# Patient Record
Sex: MALE | Race: WHITE | Employment: OTHER | URBAN - METROPOLITAN AREA
[De-identification: names, ages, dates, MRNs, and addresses within clinical notes are randomized per-mention and may not be internally consistent; named-entity substitution may affect disease eponyms.]

---

## 2017-07-12 ENCOUNTER — OFFICE VISIT (OUTPATIENT)
Dept: INTERNAL MEDICINE | Age: 47
End: 2017-07-12
Attending: INTERNAL MEDICINE

## 2017-07-12 VITALS
SYSTOLIC BLOOD PRESSURE: 121 MMHG | HEART RATE: 95 BPM | DIASTOLIC BLOOD PRESSURE: 73 MMHG | OXYGEN SATURATION: 96 % | TEMPERATURE: 98.3 F | RESPIRATION RATE: 16 BRPM | WEIGHT: 292.2 LBS | BODY MASS INDEX: 39.58 KG/M2 | HEIGHT: 72 IN

## 2017-07-12 DIAGNOSIS — K21.9 GASTROESOPHAGEAL REFLUX DISEASE WITHOUT ESOPHAGITIS: ICD-10-CM

## 2017-07-12 DIAGNOSIS — R55 VASOVAGAL SYNCOPE: Primary | ICD-10-CM

## 2017-07-12 DIAGNOSIS — I10 ESSENTIAL HYPERTENSION: ICD-10-CM

## 2017-07-12 DIAGNOSIS — K21.9 GASTROESOPHAGEAL REFLUX DISEASE, ESOPHAGITIS PRESENCE NOT SPECIFIED: ICD-10-CM

## 2017-07-12 DIAGNOSIS — F41.0 PANIC ATTACKS: ICD-10-CM

## 2017-07-12 PROBLEM — F31.9 BIPOLAR AFFECTIVE DISORDER (HCC): Status: ACTIVE | Noted: 2017-07-12

## 2017-07-12 RX ORDER — PANTOPRAZOLE SODIUM 40 MG/1
40 TABLET, DELAYED RELEASE ORAL DAILY
Qty: 30 TABLET | Refills: 2 | Status: SHIPPED | OUTPATIENT
Start: 2017-07-12 | End: 2018-04-20 | Stop reason: ALTCHOICE

## 2017-07-12 RX ORDER — LISINOPRIL AND HYDROCHLOROTHIAZIDE 20; 12.5 MG/1; MG/1
1 TABLET ORAL DAILY
Qty: 30 TABLET | Refills: 2 | Status: SHIPPED | OUTPATIENT
Start: 2017-07-12 | End: 2018-04-20

## 2017-07-12 ASSESSMENT — ENCOUNTER SYMPTOMS
BLOOD IN STOOL: 0
CONSTIPATION: 0
SPUTUM PRODUCTION: 0
EYE PAIN: 0
EYE REDNESS: 0
SHORTNESS OF BREATH: 0
SORE THROAT: 0
DOUBLE VISION: 0
EYE DISCHARGE: 0
VOMITING: 0
COUGH: 1
BLURRED VISION: 1
HEMOPTYSIS: 0
PHOTOPHOBIA: 0
DIARRHEA: 0
HEARTBURN: 1

## 2018-08-10 ENCOUNTER — HOSPITAL ENCOUNTER (EMERGENCY)
Age: 48
Discharge: HOME | End: 2018-08-10
Attending: EMERGENCY MEDICINE
Payer: COMMERCIAL

## 2018-08-10 ENCOUNTER — APPOINTMENT (OUTPATIENT)
Dept: CT IMAGING | Age: 48
End: 2018-08-10
Payer: COMMERCIAL

## 2018-08-10 VITALS
RESPIRATION RATE: 18 BRPM | WEIGHT: 290 LBS | DIASTOLIC BLOOD PRESSURE: 90 MMHG | BODY MASS INDEX: 39.28 KG/M2 | OXYGEN SATURATION: 97 % | SYSTOLIC BLOOD PRESSURE: 147 MMHG | HEART RATE: 78 BPM | TEMPERATURE: 98.3 F | HEIGHT: 72 IN

## 2018-08-10 DIAGNOSIS — N39.0 URINARY TRACT INFECTION WITH HEMATURIA, SITE UNSPECIFIED: ICD-10-CM

## 2018-08-10 DIAGNOSIS — R35.0 URINARY FREQUENCY: Primary | ICD-10-CM

## 2018-08-10 DIAGNOSIS — R31.9 HEMATURIA, UNSPECIFIED TYPE: ICD-10-CM

## 2018-08-10 DIAGNOSIS — R03.0 ELEVATED BLOOD PRESSURE READING: ICD-10-CM

## 2018-08-10 DIAGNOSIS — N28.1 RENAL CYST, LEFT: ICD-10-CM

## 2018-08-10 DIAGNOSIS — N23 RENAL COLIC: ICD-10-CM

## 2018-08-10 DIAGNOSIS — R31.9 URINARY TRACT INFECTION WITH HEMATURIA, SITE UNSPECIFIED: ICD-10-CM

## 2018-08-10 LAB
A/G RATIO: 1.7 (ref 1.1–2.2)
ALBUMIN SERPL-MCNC: 4.1 G/DL (ref 3.4–5)
ALP BLD-CCNC: 95 U/L (ref 40–129)
ALT SERPL-CCNC: 33 U/L (ref 10–40)
ANION GAP SERPL CALCULATED.3IONS-SCNC: 9 MMOL/L (ref 3–16)
AST SERPL-CCNC: 22 U/L (ref 15–37)
BILIRUB SERPL-MCNC: <0.2 MG/DL (ref 0–1)
BILIRUBIN URINE: NEGATIVE
BLOOD, URINE: ABNORMAL
BUN BLDV-MCNC: 12 MG/DL (ref 7–20)
CALCIUM SERPL-MCNC: 8.9 MG/DL (ref 8.3–10.6)
CHLORIDE BLD-SCNC: 102 MMOL/L (ref 99–110)
CLARITY: CLEAR
CO2: 25 MMOL/L (ref 21–32)
COLOR: YELLOW
CREAT SERPL-MCNC: 0.9 MG/DL (ref 0.9–1.3)
GFR AFRICAN AMERICAN: >60
GFR NON-AFRICAN AMERICAN: >60
GLOBULIN: 2.4 G/DL
GLUCOSE BLD-MCNC: 113 MG/DL (ref 70–99)
GLUCOSE URINE: NEGATIVE MG/DL
HCT VFR BLD CALC: 42.4 % (ref 40.5–52.5)
HEMOGLOBIN: 14.2 G/DL (ref 13.5–17.5)
KETONES, URINE: NEGATIVE MG/DL
LEUKOCYTE ESTERASE, URINE: NEGATIVE
MCH RBC QN AUTO: 33.1 PG (ref 26–34)
MCHC RBC AUTO-ENTMCNC: 33.6 G/DL (ref 31–36)
MCV RBC AUTO: 98.6 FL (ref 80–100)
MICROSCOPIC EXAMINATION: YES
NITRITE, URINE: NEGATIVE
PDW BLD-RTO: 13 % (ref 12.4–15.4)
PH UA: 6
PLATELET # BLD: 268 K/UL (ref 135–450)
PMV BLD AUTO: 6.6 FL (ref 5–10.5)
POTASSIUM SERPL-SCNC: 3.6 MMOL/L (ref 3.5–5.1)
PROTEIN UA: NEGATIVE MG/DL
RBC # BLD: 4.3 M/UL (ref 4.2–5.9)
RBC UA: ABNORMAL /HPF (ref 0–2)
SODIUM BLD-SCNC: 136 MMOL/L (ref 136–145)
SPECIFIC GRAVITY UA: >=1.03
TOTAL PROTEIN: 6.5 G/DL (ref 6.4–8.2)
URINE TYPE: ABNORMAL
UROBILINOGEN, URINE: 0.2 E.U./DL
WBC # BLD: 10.2 K/UL (ref 4–11)
WBC UA: ABNORMAL /HPF (ref 0–5)

## 2018-08-10 PROCEDURE — 81001 URINALYSIS AUTO W/SCOPE: CPT

## 2018-08-10 PROCEDURE — 99284 EMERGENCY DEPT VISIT MOD MDM: CPT

## 2018-08-10 PROCEDURE — 80053 COMPREHEN METABOLIC PANEL: CPT

## 2018-08-10 PROCEDURE — 74176 CT ABD & PELVIS W/O CONTRAST: CPT

## 2018-08-10 PROCEDURE — 96374 THER/PROPH/DIAG INJ IV PUSH: CPT

## 2018-08-10 PROCEDURE — 51798 US URINE CAPACITY MEASURE: CPT

## 2018-08-10 PROCEDURE — 6360000002 HC RX W HCPCS: Performed by: NURSE PRACTITIONER

## 2018-08-10 PROCEDURE — 85027 COMPLETE CBC AUTOMATED: CPT

## 2018-08-10 PROCEDURE — 6370000000 HC RX 637 (ALT 250 FOR IP): Performed by: EMERGENCY MEDICINE

## 2018-08-10 PROCEDURE — 96375 TX/PRO/DX INJ NEW DRUG ADDON: CPT

## 2018-08-10 RX ORDER — SULFAMETHOXAZOLE AND TRIMETHOPRIM 800; 160 MG/1; MG/1
1 TABLET ORAL ONCE
Status: COMPLETED | OUTPATIENT
Start: 2018-08-10 | End: 2018-08-10

## 2018-08-10 RX ORDER — KETOROLAC TROMETHAMINE 30 MG/ML
30 INJECTION, SOLUTION INTRAMUSCULAR; INTRAVENOUS ONCE
Status: COMPLETED | OUTPATIENT
Start: 2018-08-10 | End: 2018-08-10

## 2018-08-10 RX ORDER — ONDANSETRON 2 MG/ML
4 INJECTION INTRAMUSCULAR; INTRAVENOUS ONCE
Status: COMPLETED | OUTPATIENT
Start: 2018-08-10 | End: 2018-08-10

## 2018-08-10 RX ORDER — SULFAMETHOXAZOLE AND TRIMETHOPRIM 800; 160 MG/1; MG/1
1 TABLET ORAL 2 TIMES DAILY
Qty: 6 TABLET | Refills: 0 | Status: SHIPPED | OUTPATIENT
Start: 2018-08-10 | End: 2018-08-13

## 2018-08-10 RX ORDER — PHENAZOPYRIDINE HYDROCHLORIDE 100 MG/1
100 TABLET, FILM COATED ORAL 3 TIMES DAILY PRN
Qty: 9 TABLET | Refills: 0 | Status: SHIPPED | OUTPATIENT
Start: 2018-08-10 | End: 2018-08-13

## 2018-08-10 RX ORDER — PHENAZOPYRIDINE HYDROCHLORIDE 100 MG/1
200 TABLET, FILM COATED ORAL ONCE
Status: COMPLETED | OUTPATIENT
Start: 2018-08-10 | End: 2018-08-10

## 2018-08-10 RX ADMIN — KETOROLAC TROMETHAMINE 30 MG: 30 INJECTION, SOLUTION INTRAMUSCULAR at 02:02

## 2018-08-10 RX ADMIN — PHENAZOPYRIDINE HYDROCHLORIDE 200 MG: 100 TABLET ORAL at 03:52

## 2018-08-10 RX ADMIN — SULFAMETHOXAZOLE AND TRIMETHOPRIM 1 TABLET: 800; 160 TABLET ORAL at 03:52

## 2018-08-10 RX ADMIN — ONDANSETRON 4 MG: 2 INJECTION INTRAMUSCULAR; INTRAVENOUS at 02:02

## 2018-08-10 ASSESSMENT — ENCOUNTER SYMPTOMS
CONSTIPATION: 0
COUGH: 0
EYES NEGATIVE: 1
WHEEZING: 0
ABDOMINAL DISTENTION: 0
SHORTNESS OF BREATH: 0
NAUSEA: 1
ABDOMINAL PAIN: 0
VOMITING: 0
ALLERGIC/IMMUNOLOGIC NEGATIVE: 1
BACK PAIN: 0
DIARRHEA: 0

## 2018-08-10 ASSESSMENT — PAIN DESCRIPTION - LOCATION
LOCATION: ABDOMEN
LOCATION: ABDOMEN

## 2018-08-10 ASSESSMENT — PAIN DESCRIPTION - PAIN TYPE
TYPE: ACUTE PAIN
TYPE: ACUTE PAIN

## 2018-08-10 ASSESSMENT — PAIN SCALES - GENERAL
PAINLEVEL_OUTOF10: 6
PAINLEVEL_OUTOF10: 6
PAINLEVEL_OUTOF10: 5

## 2018-08-10 NOTE — ED PROVIDER NOTES
headaches. Hematological: Negative. Psychiatric/Behavioral: Negative. Positives and Pertinent negatives as per HPI. Except as noted above in the ROS, all other systems were reviewed and negative. PAST MEDICAL HISTORY     Past Medical History:   Diagnosis Date    Anxiety     Chronic back pain     Chronic neck pain     Degenerative disc disease     Degenerative disc disease     Degenerative disc disease     Diverticulitis     Hypertension     Manic depression ([facility] [address]     Obesity     Scoliosis          SURGICAL HISTORY       Past Surgical History:   Procedure Laterality Date    KIDNEY STONE SURGERY           CURRENT MEDICATIONS       Previous Medications    ASPIRIN 81 MG CHEWABLE TABLET    Take 1 tablet by mouth daily. LISINOPRIL-HYDROCHLOROTHIAZIDE (PRINZIDE;ZESTORETIC) 20-12.5 MG PER TABLET    Take 1 tablet by mouth daily         ALLERGIES     Ciprofloxacin    FAMILY HISTORY     History reviewed. No pertinent family history. SOCIAL HISTORY       Social History     Social History    Marital status:      Spouse name: N/A    Number of children: N/A    Years of education: N/A     Social History Main Topics    Smoking status: Current Every Day Smoker     Packs/day: 0.50     Years: 25.00     Types: Cigarettes    Smokeless tobacco: Never Used    Alcohol use Yes      Comment: OCC     Drug use: No    Sexual activity: Yes     Partners: Female     Other Topics Concern    None     Social History Narrative    None       SCREENINGS             PHYSICAL EXAM    (up to 7 for level 4, 8 or more for level 5)     ED Triage Vitals [08/10/18 0131]   BP Temp Temp Source Pulse Resp SpO2 Height Weight   (!) 170/96 98.3 °F (36.8 °C) Oral 97 18 97 % 6' (1.829 m) 290 lb (131.5 kg)       Physical Exam   Constitutional: He is oriented to person, place, and time. He appears well-developed and well-nourished. No distress. HENT:   Head: Normocephalic and atraumatic.    Nose: Nose normal. Dr. Kalyani Dobbs, please see attending note for further information    The patient tolerated their visit well. They were seen and evaluated by the attending physician who agreed with the assessment and plan. The patient and / or the family were informed of the results of any tests, a time was given to answer questions, a plan was proposed and they agreed with plan. FINAL IMPRESSION      1. Urinary frequency          DISPOSITION/PLAN   DISPOSITION        PATIENT REFERRED TO:  No follow-up provider specified.     DISCHARGE MEDICATIONS:  New Prescriptions    No medications on file       DISCONTINUED MEDICATIONS:  Discontinued Medications    No medications on file              (Please note that portions of this note were completed with a voice recognition program.  Efforts were made to edit the dictations but occasionally words are mis-transcribed.)    ARAMIS Barry CNP (electronically signed)           ARAMIS Barry CNP  08/10/18 0303

## 2018-12-09 ENCOUNTER — HOSPITAL ENCOUNTER (EMERGENCY)
Age: 48
Discharge: HOME | End: 2018-12-09
Payer: COMMERCIAL

## 2018-12-09 VITALS
OXYGEN SATURATION: 97 % | HEART RATE: 72 BPM | DIASTOLIC BLOOD PRESSURE: 89 MMHG | SYSTOLIC BLOOD PRESSURE: 135 MMHG | RESPIRATION RATE: 13 BRPM

## 2018-12-09 DIAGNOSIS — T78.40XA ALLERGIC REACTION, INITIAL ENCOUNTER: Primary | ICD-10-CM

## 2018-12-09 PROCEDURE — 99282 EMERGENCY DEPT VISIT SF MDM: CPT

## 2018-12-09 ASSESSMENT — ENCOUNTER SYMPTOMS
BACK PAIN: 0
RHINORRHEA: 0
CHEST TIGHTNESS: 0
COUGH: 0
DIARRHEA: 0
NAUSEA: 0
VOMITING: 0
SHORTNESS OF BREATH: 0
SORE THROAT: 1
TROUBLE SWALLOWING: 0
VOICE CHANGE: 0
CONSTIPATION: 0
ABDOMINAL PAIN: 0

## 2018-12-09 NOTE — ED NOTES
See paper charting for 2nd epic downtime. Pt d/c'd with d/c papers.    Pt left ED in stable condition        Jayden Rodrigues RN  12/09/18 2263

## 2018-12-09 NOTE — ED PROVIDER NOTES
 Degenerative disc disease     Degenerative disc disease     Degenerative disc disease     Diverticulitis     Hypertension     Manic depression ([facility] [address]     Obesity     Scoliosis          SURGICAL HISTORY       Past Surgical History:   Procedure Laterality Date    KIDNEY STONE SURGERY           CURRENT MEDICATIONS       Discharge Medication List as of 12/9/2018  4:59 AM      CONTINUE these medications which have NOT CHANGED    Details   lisinopril-hydrochlorothiazide (PRINZIDE;ZESTORETIC) 20-12.5 MG per tablet Take 1 tablet by mouth daily, Disp-30 tablet, R-0Print      aspirin 81 MG chewable tablet Take 1 tablet by mouth daily. , Disp-30 tablet, R-3             ALLERGIES     Ciprofloxacin    FAMILY HISTORY     No family history on file. SOCIAL HISTORY       Social History     Social History    Marital status:      Spouse name: N/A    Number of children: N/A    Years of education: N/A     Social History Main Topics    Smoking status: Current Every Day Smoker     Packs/day: 0.50     Years: 25.00     Types: Cigarettes    Smokeless tobacco: Never Used    Alcohol use Yes      Comment: OCC     Drug use: No    Sexual activity: Yes     Partners: Female     Other Topics Concern    Not on file     Social History Narrative    No narrative on file       SCREENINGS             PHYSICAL EXAM    (up to 7 for level 4, 8 ormore for level 5)     ED Triage Vitals [12/09/18 0457]   BP Temp Temp src Pulse Resp SpO2 Height Weight   135/89 -- -- 72 13 97 % -- --       Physical Exam   Constitutional: He is oriented to person, place, and time. Vital signs are normal. He appears well-developed and well-nourished. He is cooperative. Non-toxic appearance. He does not have a sickly appearance. He does not appear ill. No distress. Speaking in full sentences, not in acute distress   HENT:   Head: Normocephalic and atraumatic.    Mouth/Throat: Oropharynx is clear and moist and mucous membranes are normal.   No noted above presents for possible allergic reaction after taking tramadol. States he feels like his throat was swelling.   -patient states it started to occur 3 hours prior to presentation but waited hoping it would improve.   -speaking clearly, not in acute distress, vitals stable. Denies dysphagia, trismus, sob, voice change or cough. -Given solumedrol, Pepcid, bendaryl in ED  -patient given cup of water, able to swallow with no issues  -On multiple reassessment patient states he feels swelling has improved and denies issues with swallowing water.   -Noacute pathology was noted and plan for discharge home with close follow up with PCP was discussed with patient. Strict ED return precautions given for new/worsending symptoms. Patient  in agreement with plan, verbally confirm understanding and have no further questions/concerns. REASSESSMENT      Well appearing, non toxic, alert, oriented speaking in full sentences and hemodynamically stable upon discharge      CRITICAL CARE TIME   Total Critical Care time was 0 minutes, excluding separately reportableprocedures. There was a high probability of clinicallysignificant/life threatening deterioration in the patient's condition which required my urgent intervention. CONSULTS:  None    PROCEDURES:  Unless otherwise noted below, none     Procedures    FINAL IMPRESSION      1. Allergic reaction, initial encounter          DISPOSITION/PLAN   DISPOSITION Decision To Discharge 12/09/2018 05:50:02 AM      PATIENT REFERREDTO:  No follow-up provider specified.     DISCHARGE MEDICATIONS:  Discharge Medication List as of 12/9/2018  4:59 AM             (Please note that portions of this note were completed with a voice recognition program.  Efforts were made to edit the dictations but occasionally wordsare mis-transcribed.)    Geraldine Walsh MD (electronically signed)  Attending Emergency Physician            Geraldine Walsh MD  12/09/18 0164

## 2019-03-13 ENCOUNTER — APPOINTMENT (OUTPATIENT)
Dept: GENERAL RADIOLOGY | Age: 49
End: 2019-03-13
Payer: COMMERCIAL

## 2019-03-13 ENCOUNTER — HOSPITAL ENCOUNTER (EMERGENCY)
Age: 49
Discharge: HOME | End: 2019-03-13
Payer: COMMERCIAL

## 2019-03-13 VITALS
SYSTOLIC BLOOD PRESSURE: 143 MMHG | BODY MASS INDEX: 39.28 KG/M2 | HEART RATE: 87 BPM | RESPIRATION RATE: 16 BRPM | DIASTOLIC BLOOD PRESSURE: 107 MMHG | HEIGHT: 72 IN | TEMPERATURE: 98.4 F | OXYGEN SATURATION: 97 % | WEIGHT: 290 LBS

## 2019-03-13 DIAGNOSIS — M10.9 ACUTE GOUT INVOLVING TOE OF RIGHT FOOT, UNSPECIFIED CAUSE: Primary | ICD-10-CM

## 2019-03-13 LAB
A/G RATIO: 1.9 (ref 1.1–2.2)
ALBUMIN SERPL-MCNC: 4.3 G/DL (ref 3.4–5)
ALP BLD-CCNC: 93 U/L (ref 40–129)
ALT SERPL-CCNC: 27 U/L (ref 10–40)
ANION GAP SERPL CALCULATED.3IONS-SCNC: 10 MMOL/L (ref 3–16)
AST SERPL-CCNC: 17 U/L (ref 15–37)
BASOPHILS ABSOLUTE: 0.1 K/UL (ref 0–0.2)
BASOPHILS RELATIVE PERCENT: 1 %
BILIRUB SERPL-MCNC: 0.3 MG/DL (ref 0–1)
BUN BLDV-MCNC: 13 MG/DL (ref 7–20)
CALCIUM SERPL-MCNC: 8.7 MG/DL (ref 8.3–10.6)
CHLORIDE BLD-SCNC: 100 MMOL/L (ref 99–110)
CO2: 24 MMOL/L (ref 21–32)
CREAT SERPL-MCNC: 0.9 MG/DL (ref 0.9–1.3)
EOSINOPHILS ABSOLUTE: 0.2 K/UL (ref 0–0.6)
EOSINOPHILS RELATIVE PERCENT: 2.3 %
GFR AFRICAN AMERICAN: >60
GFR NON-AFRICAN AMERICAN: >60
GLOBULIN: 2.3 G/DL
GLUCOSE BLD-MCNC: 95 MG/DL (ref 70–99)
HCT VFR BLD CALC: 42.3 % (ref 40.5–52.5)
HEMOGLOBIN: 14.5 G/DL (ref 13.5–17.5)
LYMPHOCYTES ABSOLUTE: 2.3 K/UL (ref 1–5.1)
LYMPHOCYTES RELATIVE PERCENT: 26.6 %
MCH RBC QN AUTO: 34.4 PG (ref 26–34)
MCHC RBC AUTO-ENTMCNC: 34.4 G/DL (ref 31–36)
MCV RBC AUTO: 100 FL (ref 80–100)
MONOCYTES ABSOLUTE: 0.5 K/UL (ref 0–1.3)
MONOCYTES RELATIVE PERCENT: 5.5 %
NEUTROPHILS ABSOLUTE: 5.5 K/UL (ref 1.7–7.7)
NEUTROPHILS RELATIVE PERCENT: 64.6 %
PDW BLD-RTO: 12.8 % (ref 12.4–15.4)
PLATELET # BLD: 281 K/UL (ref 135–450)
PMV BLD AUTO: 6.7 FL (ref 5–10.5)
POTASSIUM SERPL-SCNC: 3.7 MMOL/L (ref 3.5–5.1)
RBC # BLD: 4.23 M/UL (ref 4.2–5.9)
SODIUM BLD-SCNC: 134 MMOL/L (ref 136–145)
TOTAL PROTEIN: 6.6 G/DL (ref 6.4–8.2)
TROPONIN: <0.01 NG/ML
WBC # BLD: 8.5 K/UL (ref 4–11)

## 2019-03-13 PROCEDURE — 71046 X-RAY EXAM CHEST 2 VIEWS: CPT

## 2019-03-13 PROCEDURE — 93005 ELECTROCARDIOGRAM TRACING: CPT | Performed by: EMERGENCY MEDICINE

## 2019-03-13 PROCEDURE — 84484 ASSAY OF TROPONIN QUANT: CPT

## 2019-03-13 PROCEDURE — 85025 COMPLETE CBC W/AUTO DIFF WBC: CPT

## 2019-03-13 PROCEDURE — 99284 EMERGENCY DEPT VISIT MOD MDM: CPT

## 2019-03-13 PROCEDURE — 80053 COMPREHEN METABOLIC PANEL: CPT

## 2019-03-13 PROCEDURE — 6370000000 HC RX 637 (ALT 250 FOR IP): Performed by: NURSE PRACTITIONER

## 2019-03-13 PROCEDURE — 73630 X-RAY EXAM OF FOOT: CPT

## 2019-03-13 RX ORDER — LISINOPRIL AND HYDROCHLOROTHIAZIDE 25; 20 MG/1; MG/1
1 TABLET ORAL DAILY
Qty: 30 TABLET | Refills: 0 | Status: SHIPPED | OUTPATIENT
Start: 2019-03-13 | End: 2020-08-25 | Stop reason: SDUPTHER

## 2019-03-13 RX ORDER — INDOMETHACIN 25 MG/1
50 CAPSULE ORAL ONCE
Status: COMPLETED | OUTPATIENT
Start: 2019-03-13 | End: 2019-03-13

## 2019-03-13 RX ORDER — INDOMETHACIN 50 MG/1
50 CAPSULE ORAL
Qty: 15 CAPSULE | Refills: 0 | Status: SHIPPED | OUTPATIENT
Start: 2019-03-13 | End: 2019-05-14

## 2019-03-13 RX ORDER — LISINOPRIL AND HYDROCHLOROTHIAZIDE 20; 12.5 MG/1; MG/1
2 TABLET ORAL DAILY
Qty: 90 TABLET | Refills: 1 | Status: SHIPPED | OUTPATIENT
Start: 2019-03-13 | End: 2019-05-14

## 2019-03-13 RX ADMIN — INDOMETHACIN 50 MG: 25 CAPSULE ORAL at 23:29

## 2019-03-13 ASSESSMENT — PAIN DESCRIPTION - LOCATION: LOCATION: FOOT

## 2019-03-13 ASSESSMENT — PAIN SCALES - GENERAL
PAINLEVEL_OUTOF10: 7
PAINLEVEL_OUTOF10: 4

## 2019-03-13 ASSESSMENT — PAIN DESCRIPTION - PAIN TYPE: TYPE: ACUTE PAIN

## 2019-03-13 ASSESSMENT — PAIN DESCRIPTION - DESCRIPTORS: DESCRIPTORS: ACHING;SHARP

## 2019-03-13 ASSESSMENT — PAIN DESCRIPTION - ORIENTATION: ORIENTATION: LEFT

## 2019-03-14 LAB
EKG ATRIAL RATE: 86 BPM
EKG DIAGNOSIS: NORMAL
EKG P AXIS: 58 DEGREES
EKG P-R INTERVAL: 168 MS
EKG Q-T INTERVAL: 356 MS
EKG QRS DURATION: 86 MS
EKG QTC CALCULATION (BAZETT): 426 MS
EKG R AXIS: 37 DEGREES
EKG T AXIS: 34 DEGREES
EKG VENTRICULAR RATE: 86 BPM

## 2019-03-14 PROCEDURE — 93010 ELECTROCARDIOGRAM REPORT: CPT | Performed by: INTERNAL MEDICINE

## 2019-05-14 ENCOUNTER — APPOINTMENT (OUTPATIENT)
Dept: GENERAL RADIOLOGY | Age: 49
End: 2019-05-14
Payer: COMMERCIAL

## 2019-05-14 ENCOUNTER — HOSPITAL ENCOUNTER (EMERGENCY)
Age: 49
Discharge: HOME | End: 2019-05-15
Attending: EMERGENCY MEDICINE
Payer: COMMERCIAL

## 2019-05-14 VITALS
OXYGEN SATURATION: 96 % | HEIGHT: 72 IN | TEMPERATURE: 98.2 F | RESPIRATION RATE: 17 BRPM | SYSTOLIC BLOOD PRESSURE: 119 MMHG | BODY MASS INDEX: 39.96 KG/M2 | HEART RATE: 84 BPM | DIASTOLIC BLOOD PRESSURE: 80 MMHG | WEIGHT: 295 LBS

## 2019-05-14 DIAGNOSIS — F17.200 SMOKER: ICD-10-CM

## 2019-05-14 DIAGNOSIS — Z86.79 HISTORY OF HYPERTENSION: ICD-10-CM

## 2019-05-14 DIAGNOSIS — R07.9 CHEST PAIN, UNSPECIFIED TYPE: Primary | ICD-10-CM

## 2019-05-14 LAB
A/G RATIO: 1.7 (ref 1.1–2.2)
ALBUMIN SERPL-MCNC: 4.5 G/DL (ref 3.4–5)
ALP BLD-CCNC: 109 U/L (ref 40–129)
ALT SERPL-CCNC: 30 U/L (ref 10–40)
ANION GAP SERPL CALCULATED.3IONS-SCNC: 12 MMOL/L (ref 3–16)
AST SERPL-CCNC: 19 U/L (ref 15–37)
BASOPHILS ABSOLUTE: 0.1 K/UL (ref 0–0.2)
BASOPHILS RELATIVE PERCENT: 0.7 %
BILIRUB SERPL-MCNC: <0.2 MG/DL (ref 0–1)
BUN BLDV-MCNC: 12 MG/DL (ref 7–20)
CALCIUM SERPL-MCNC: 9 MG/DL (ref 8.3–10.6)
CHLORIDE BLD-SCNC: 102 MMOL/L (ref 99–110)
CO2: 26 MMOL/L (ref 21–32)
CREAT SERPL-MCNC: 0.9 MG/DL (ref 0.9–1.3)
EOSINOPHILS ABSOLUTE: 0.2 K/UL (ref 0–0.6)
EOSINOPHILS RELATIVE PERCENT: 2.2 %
GFR AFRICAN AMERICAN: >60
GFR NON-AFRICAN AMERICAN: >60
GLOBULIN: 2.6 G/DL
GLUCOSE BLD-MCNC: 105 MG/DL (ref 70–99)
HCT VFR BLD CALC: 43.8 % (ref 40.5–52.5)
HEMOGLOBIN: 15.4 G/DL (ref 13.5–17.5)
LYMPHOCYTES ABSOLUTE: 2.6 K/UL (ref 1–5.1)
LYMPHOCYTES RELATIVE PERCENT: 24.2 %
MCH RBC QN AUTO: 34.8 PG (ref 26–34)
MCHC RBC AUTO-ENTMCNC: 35.2 G/DL (ref 31–36)
MCV RBC AUTO: 99 FL (ref 80–100)
MONOCYTES ABSOLUTE: 0.6 K/UL (ref 0–1.3)
MONOCYTES RELATIVE PERCENT: 5.6 %
NEUTROPHILS ABSOLUTE: 7.1 K/UL (ref 1.7–7.7)
NEUTROPHILS RELATIVE PERCENT: 67.3 %
PDW BLD-RTO: 13.2 % (ref 12.4–15.4)
PLATELET # BLD: 289 K/UL (ref 135–450)
PMV BLD AUTO: 6.9 FL (ref 5–10.5)
POTASSIUM SERPL-SCNC: 3.7 MMOL/L (ref 3.5–5.1)
RBC # BLD: 4.42 M/UL (ref 4.2–5.9)
SODIUM BLD-SCNC: 140 MMOL/L (ref 136–145)
TOTAL PROTEIN: 7.1 G/DL (ref 6.4–8.2)
TROPONIN: <0.01 NG/ML
TROPONIN: <0.01 NG/ML
WBC # BLD: 10.6 K/UL (ref 4–11)

## 2019-05-14 PROCEDURE — 6370000000 HC RX 637 (ALT 250 FOR IP): Performed by: NURSE PRACTITIONER

## 2019-05-14 PROCEDURE — 85025 COMPLETE CBC W/AUTO DIFF WBC: CPT

## 2019-05-14 PROCEDURE — 80053 COMPREHEN METABOLIC PANEL: CPT

## 2019-05-14 PROCEDURE — 71046 X-RAY EXAM CHEST 2 VIEWS: CPT

## 2019-05-14 PROCEDURE — 84484 ASSAY OF TROPONIN QUANT: CPT

## 2019-05-14 PROCEDURE — 93005 ELECTROCARDIOGRAM TRACING: CPT | Performed by: EMERGENCY MEDICINE

## 2019-05-14 PROCEDURE — 99285 EMERGENCY DEPT VISIT HI MDM: CPT

## 2019-05-14 RX ORDER — NITROGLYCERIN 0.4 MG/1
0.4 TABLET SUBLINGUAL EVERY 5 MIN PRN
Status: DISCONTINUED | OUTPATIENT
Start: 2019-05-14 | End: 2019-05-15 | Stop reason: HOSPADM

## 2019-05-14 RX ORDER — ASPIRIN 81 MG/1
324 TABLET, CHEWABLE ORAL ONCE
Status: COMPLETED | OUTPATIENT
Start: 2019-05-14 | End: 2019-05-14

## 2019-05-14 RX ADMIN — NITROGLYCERIN 0.4 MG: 0.4 TABLET, ORALLY DISINTEGRATING SUBLINGUAL at 20:45

## 2019-05-14 RX ADMIN — ASPIRIN 81 MG 324 MG: 81 TABLET ORAL at 20:45

## 2019-05-14 ASSESSMENT — PAIN DESCRIPTION - LOCATION
LOCATION: CHEST

## 2019-05-14 ASSESSMENT — PAIN SCALES - GENERAL
PAINLEVEL_OUTOF10: 0
PAINLEVEL_OUTOF10: 6
PAINLEVEL_OUTOF10: 3

## 2019-05-14 ASSESSMENT — PAIN DESCRIPTION - DESCRIPTORS: DESCRIPTORS: TINGLING

## 2019-05-14 ASSESSMENT — PAIN DESCRIPTION - PAIN TYPE
TYPE: ACUTE PAIN
TYPE: ACUTE PAIN

## 2019-05-14 ASSESSMENT — ENCOUNTER SYMPTOMS
VOMITING: 0
COLOR CHANGE: 0
WHEEZING: 0
ABDOMINAL PAIN: 0
BACK PAIN: 0
NAUSEA: 0
SHORTNESS OF BREATH: 0
COUGH: 0
DIARRHEA: 0

## 2019-05-14 ASSESSMENT — HEART SCORE: ECG: 0

## 2019-05-14 ASSESSMENT — PAIN DESCRIPTION - ORIENTATION
ORIENTATION: LEFT
ORIENTATION: LEFT

## 2019-05-15 LAB
EKG ATRIAL RATE: 101 BPM
EKG DIAGNOSIS: NORMAL
EKG P AXIS: 56 DEGREES
EKG P-R INTERVAL: 164 MS
EKG Q-T INTERVAL: 330 MS
EKG QRS DURATION: 88 MS
EKG QTC CALCULATION (BAZETT): 427 MS
EKG R AXIS: 48 DEGREES
EKG T AXIS: 41 DEGREES
EKG VENTRICULAR RATE: 101 BPM

## 2019-05-15 PROCEDURE — 93010 ELECTROCARDIOGRAM REPORT: CPT | Performed by: INTERNAL MEDICINE

## 2019-05-15 ASSESSMENT — PAIN SCALES - GENERAL: PAINLEVEL_OUTOF10: 0

## 2019-05-15 NOTE — ED NOTES
Discharge: Pt discharged to home as per order. IV removed. Instructions given. Pt verbalized understanding. Denied questions.        Fredi Borrego RN  05/15/19 0560

## 2019-05-15 NOTE — ED PROVIDER NOTES
I independently performed a history and physical on Bhupendra Khalil. All diagnostic, treatment, and disposition decisions were made by myself in conjunction with the advanced practice provider.     -Bhupendra Khalil is a 51 y.o. male with a history of HTN, Bipolar disorder since ED for chest pain. Patient reports he was lifting weights when he felt as though 'someone was standing on my chest' took half of bp medication and laid on the couch. Denies sob, nausea, diaphoresis. Reports episode lasted ~45 min. -PE: well-appearing, speaking full sentences, clear to auscultation bilaterally, regular rate and rhythm abdomen soft, nondistended and nontender to palpation.  -Lab workup was within normal limits  -CXr: No acute abnormality  -The Ekg interpreted by me shows  sinus tachycardia, dgrj=641   Axis is   Normal  QTc is  427  Intervals and Durations are unremarkable. ST Segments: normal  No significant change from prior EKG dated 3/13/2019  -HEART score: 4  -Discussed results and plan for admission given patient's risk factors and concerning story. Chin states he cannot stay and is requesting outpatient follow-up. Strongly recommended patient stay for admission for inpatient workup given concerning nature of the story. Despite discussing risks of leaving against medical advice, insists he cannot stay.   -was willing to stay for repeat troponin which was negative  -Left AMA    For further details of [facility] emergency department encounter, please see NP [facility]'s documentation.         Servando Washburn MD  05/17/19 4335

## 2019-05-15 NOTE — ED PROVIDER NOTES
[facility]  ED  EMERGENCY DEPARTMENT ENCOUNTER        Pt Name: Janet Adams  MRN: [id number]  Armstrongfhenri 1970  Date of evaluation: 5/14/2019  Provider: ARAMIS Christianson CNP  PCP: No primary care provider on file. This patient was seen and evaluated by the attending physician Dr. Alex Baldwin       Chief Complaint   Patient presents with    Chest Pain     started 30 minutes ago. Left side and radiates into left shoulder. denies any headache, SOB, N/V. No cardiac hx. Took 1 baby aspirin before coming. HISTORY OF PRESENT ILLNESS   (Location/Symptom, Timing/Onset, Context/Setting, Quality, Duration, Modifying Factors, Severity)  Note limiting factors. Janet Adams is a 51 y.o. male who presents today with left sided chest pain that began about 45 minutes prior to arrival. He states he was lifting some hand weights when the pain began. He states that he has history of HTN, but no CAD. He states that he had a stress test a few years ago which he passed at the time. He states that he does smoke 1/2 ppd. He states that his pain never went away with rest, however he has had similar pain in the past. He states that he has had some cough and sinus allergies the past few weeks, however no fever or chills, no body aches or headaches. He denies N/V/D, he denies getting sweaty or dizzy with the pain. He states mom has some type of heart issue, but he is unsure exactly what she had. He rates the pain a 6 out of 10. Denies taking any medicine for the pain. No additional complaints, no additional aggravating or relieving factors. The patient presents awake, alert and in no acute respiratory distress or toxic appearance. Nursing Notes were all reviewed and agreed with or any disagreements were addressed  in the HPI. REVIEW OF SYSTEMS    (2-9 systems for level 4, 10 or more for level 5)     Review of Systems   Constitutional: Negative for chills and fever. HENT: Negative for congestion. Respiratory: Negative for cough, shortness of breath and wheezing. Cardiovascular: Positive for chest pain. Patient complains of left-sided chest pain and began about 45 minutes prior to ED arrival and he was doing some hand weight weight lifting. He denies any pleuritic chest pain or shortness of breath   Gastrointestinal: Negative for abdominal pain, diarrhea, nausea and vomiting. Genitourinary: Negative for difficulty urinating and dysuria. Musculoskeletal: Negative for back pain. Skin: Negative for color change. Neurological: Negative for weakness, numbness and headaches. Positives and Pertinent negatives as per HPI. Except as noted abovein the ROS, all other systems were reviewed and negative. PAST MEDICAL HISTORY     Past Medical History:   Diagnosis Date    Anxiety     Chronic back pain     Chronic neck pain     Degenerative disc disease     Degenerative disc disease     Degenerative disc disease     Diverticulitis     Hypertension     Manic depression ([facility] [address]     Obesity     Scoliosis          SURGICAL HISTORY     Past Surgical History:   Procedure Laterality Date    KIDNEY STONE SURGERY           CURRENTMEDICATIONS       Discharge Medication List as of 5/14/2019 11:52 PM      CONTINUE these medications which have NOT CHANGED    Details   lisinopril-hydrochlorothiazide (PRINZIDE;ZESTORETIC) 20-25 MG per tablet Take 1 tablet by mouth daily, Disp-30 tablet, R-0Print      aspirin 81 MG chewable tablet Take 1 tablet by mouth daily. , Disp-30 tablet, R-3               ALLERGIES     Ciprofloxacin    FAMILYHISTORY     History reviewed. No pertinent family history.        SOCIAL HISTORY       Social History     Socioeconomic History    Marital status:      Spouse name: None    Number of children: None    Years of education: None    Highest education level: None   Occupational History    None   Social Needs    Financial resource well-developed and well-nourished. HENT:   Head: Normocephalic. Right Ear: External ear normal.   Left Ear: External ear normal.   Mouth/Throat: Oropharynx is clear and moist.   Eyes: Right eye exhibits no discharge. Left eye exhibits no discharge. No scleral icterus. Neck: Normal range of motion. Neck supple. Cardiovascular: Normal rate and normal heart sounds. Patient has normal S1 and 2, peripheral pulses are 2+, no peripheral edema observed. Pulmonary/Chest: Effort normal and breath sounds normal. No respiratory distress. Airway patent with symmetric rise and fall chest, lungs are clear anteriorly and posteriorly, the patient is not tachypneic or dyspneic and saturations are 99% on room air   Abdominal: Soft. Bowel sounds are normal.   Musculoskeletal: Normal range of motion. Neurological: He is alert and oriented to person, place, and time. No cranial nerve deficit. GCS eye subscore is 4. GCS verbal subscore is 5. GCS motor subscore is 6. Skin: Skin is warm. He is not diaphoretic. No pallor. Psychiatric: He has a normal mood and affect. His behavior is normal.   Nursing note and vitals reviewed.       DIAGNOSTIC RESULTS   LABS:    Labs Reviewed   CBC WITH AUTO DIFFERENTIAL - Abnormal; Notable for the following components:       Result Value    MCH 34.8 (*)     All other components within normal limits    Narrative:     Performed at:  [address] [place] [address] [employer]   Phone [phone]   COMPREHENSIVE METABOLIC PANEL - Abnormal; Notable for the following components:    Glucose 105 (*)     All other components within normal limits    Narrative:     Performed at:  [place] [address] [employer]   Phone [phone]   TROPONIN    Narrative:     Performed at:  [address] [place] [address] [employer]   Phone [phone]   TROPONIN    Narrative: Performed at:  [facility]) [facility]  [address] [place] [address],  [place], [address] [place] [address] [facility]   Phone [phone]       All other labs were within normal range or not returned as of this dictation. EKG: All EKG's are interpreted by the Emergency Department Physician who either signs orCo-signs this chart in the absence of a cardiologist.  Please see their note for interpretation of EKG. RADIOLOGY:   Non-plain film images such as CT, Ultrasound and MRI are read by the radiologist. Plain radiographic images are visualized andpreliminarily interpreted by the  ED Provider with the below findings:        Interpretation [facility] Radiologist below, if available at the time of this note:    XR CHEST STANDARD (2 VW)   Final Result   1. No acute abnormality. PROCEDURES   Unless otherwise noted below, none     Procedures    CRITICAL CARE TIME   N/A    CONSULTS:  None      EMERGENCY DEPARTMENT COURSE and DIFFERENTIALDIAGNOSIS/MDM:   Vitals:    Vitals:    05/14/19 2052 05/14/19 2056 05/14/19 2214 05/14/19 2355   BP:  (!) 141/87 127/84 119/80   Pulse: 115 99 85 84   Resp:  17 17 17   Temp:       TempSrc:       SpO2:  95% 96% 96%   Weight:       Height:           Patient was given thefollowing medications:  Medications   nitroGLYCERIN (NITROSTAT) SL tablet 0.4 mg (0.4 mg Sublingual Given 5/14/19 2045)   aspirin chewable tablet 324 mg (324 mg Oral Given 5/14/19 2045)       Patient complains of chest pain at about 45 minutes prior to ED arrival, states he was doing weight lifting. Denies any history of coronary artery disease, does have a history of hypertension and is a smoker. After evaluation and examination patient IV access, blood work, chest x-ray, EKG, aspirin and nitroglycerin were ordered. CBC shows no sepsis or anemia. Metabolic panel shows no electrolyte disturbances or renal insufficiency. Liver functions are normal.  Initial troponin is negative.   Chest x-ray shows no acute cardio pulmonary findings. EKG shows tachycardia rate of 101 bpm, no ST elevation, please see the attending physician documentation for EKG interpretation. I did discuss with the patient about admitting him to the hospital for chest pain rule out however, the patient eagerly wants to go home as the only had one car at home. He did agree to do a delta troponin. However, because of his onset of chest pain, medical history I discussed with him again about being admitted however he declined. His repeat troponin is negative. I then spoke with the attending physician and it was agreed the patient signed out against medical advice. The patient has decided to leave against medical advice. The patient is awake and alert, non-intoxicated, non-suicidal, nonpsychotic. There is no medical condition that prevents or inhibits their understanding of the risks/benefits of their decision. I discussed the nature and purpose, risks and benefits, as well as, the alternatives of admission with Darin Hermosillo. Darin Hermosillo was given the time and opportunity to ask questions and consider their options, and after the discussion, Darin Hermosillo decided to refuse. I informed Darin Hermosillo that refusal could lead to, but was not limited to, death, permanent disability, or severe pain. If present, I asked the relatives or significant others of Darin Hermosillo to dissuade them without success. Prior to refusing, their nurse and I determined and agreed that Darin Hermosillo had the capacity to make this decision and understood the consequences of that decision. Darin Hermosillo signed the refusal of treatment form and their nurse signed the form agreeing that the patient/guardian had received informed consent. After refusal, I made every reasonable opportunity to treat Darin Hermosillo to the best of my ability.     Therefore, shared medical decision was made between the attending physician, the patient and myself we agreed that he would have to sign out against medical advice. Patient agreed with this plan. He was given a referral to cardiology, interested to call tomorrow for an appointment. Educated to return immediately for any worsening symptoms. The patient verbalized understanding of instructions and left the department in stable condition. FINAL IMPRESSION      1. Chest pain, unspecified type    2. Smoker    3.  History of hypertension          DISPOSITION/PLAN   DISPOSITION [place] 05/14/2019 11:04:51 PM      PATIENT REFERREDTO:  Edwinna Dubin, MD  [facility] [address]  [phone]    Schedule an appointment as soon as possible for a visit in 2 days  This is a cardiology referral, call tomorrow for an appointment    [facility]  ED  [address] [facility] [address]  Go to   If symptoms worsen      DISCHARGE MEDICATIONS:  Discharge Medication List as of 5/14/2019 11:52 PM          DISCONTINUED MEDICATIONS:  Discharge Medication List as of 5/14/2019 11:52 PM      STOP taking these medications       indomethacin (INDOCIN) 50 MG capsule Comments:   Reason for Stopping:         lisinopril-hydrochlorothiazide (PRINZIDE;ZESTORETIC) 20-12.5 MG per tablet Comments:   Reason for Stopping:         lisinopril-hydrochlorothiazide (PRINZIDE;ZESTORETIC) 20-12.5 MG per tablet Comments:   Reason for Stopping:                      (Please note that portions ofthis note were completed with a voice recognition program.  Efforts were made to edit the dictations but occasionally words are mis-transcribed.)    ARAMIS James CNP (electronically signed)          ARAMIS James CNP  05/15/19 0007

## 2019-09-04 ENCOUNTER — HOSPITAL ENCOUNTER (EMERGENCY)
Age: 49
Discharge: HOME | End: 2019-09-04
Payer: COMMERCIAL

## 2019-09-04 VITALS
WEIGHT: 300 LBS | BODY MASS INDEX: 40.69 KG/M2 | DIASTOLIC BLOOD PRESSURE: 88 MMHG | RESPIRATION RATE: 16 BRPM | TEMPERATURE: 97.9 F | SYSTOLIC BLOOD PRESSURE: 154 MMHG | HEART RATE: 81 BPM | OXYGEN SATURATION: 96 %

## 2019-09-04 DIAGNOSIS — Z77.098 CHEMICAL EXPOSURE: ICD-10-CM

## 2019-09-04 DIAGNOSIS — Z77.098 CHEMICAL EXPOSURE OF EYE: Primary | ICD-10-CM

## 2019-09-04 PROCEDURE — 4500000023 HC ED LEVEL 3 PROCEDURE

## 2019-09-04 PROCEDURE — 99283 EMERGENCY DEPT VISIT LOW MDM: CPT

## 2019-09-04 PROCEDURE — 2580000003 HC RX 258: Performed by: NURSE PRACTITIONER

## 2019-09-04 RX ORDER — SODIUM CHLORIDE, SODIUM LACTATE, POTASSIUM CHLORIDE, CALCIUM CHLORIDE 600; 310; 30; 20 MG/100ML; MG/100ML; MG/100ML; MG/100ML
INJECTION, SOLUTION INTRAVENOUS CONTINUOUS
Status: DISCONTINUED | OUTPATIENT
Start: 2019-09-04 | End: 2019-09-04 | Stop reason: HOSPADM

## 2019-09-04 ASSESSMENT — VISUAL ACUITY
OD: 20/20
OS: 20/20
OU: 20/20

## 2019-09-04 NOTE — ED NOTES
Pt able to tolerate complete irrigation with [employer].      José Miguel Mcghee LPN  40/80/30 8410

## 2019-09-04 NOTE — ED PROVIDER NOTES
Worry: Not on file     Inability: Not on file    Transportation needs:     Medical: Not on file     Non-medical: Not on file   Tobacco Use    Smoking status: Current Every Day Smoker     Packs/day: 0.50     Years: 25.00     Pack years: 12.50     Types: Cigarettes    Smokeless tobacco: Never Used   Substance and Sexual Activity    Alcohol use: Yes     Comment: OCC     Drug use: No    Sexual activity: Yes     Partners: Female   Lifestyle    Physical activity:     Days per week: Not on file     Minutes per session: Not on file    Stress: Not on file   Relationships    Social connections:     Talks on phone: Not on file     Gets together: Not on file     Attends Restorationism service: Not on file     Active member of club or organization: Not on file     Attends meetings of clubs or organizations: Not on file     Relationship status: Not on file    Intimate partner violence:     Fear of current or ex partner: Not on file     Emotionally abused: Not on file     Physically abused: Not on file     Forced sexual activity: Not on file   Other Topics Concern    Not on file   Social History Narrative    Not on file     Current Facility-Administered Medications   Medication Dose Route Frequency Provider Last Rate Last Dose    lactated ringers infusion   Intravenous Continuous ARAMIS Tim CNP   Stopped at 09/04/19 1832     Current Outpatient Medications   Medication Sig Dispense Refill    lisinopril-hydrochlorothiazide (PRINZIDE;ZESTORETIC) 20-25 MG per tablet Take 1 tablet by mouth daily 30 tablet 0    aspirin 81 MG chewable tablet Take 1 tablet by mouth daily.  30 tablet 3     Allergies   Allergen Reactions    Ciprofloxacin Itching       REVIEW OF SYSTEMS    6 systems reviewed, pertinent positives per HPI otherwise noted to be negative    PHYSICAL EXAM  BP (!) 154/88   Pulse 81   Temp 97.9 °F (36.6 °C) (Oral)   Resp 16   Wt 300 lb (136.1 kg)   SpO2 96%   BMI 40.69 kg/m²   GENERAL APPEARANCE: Awake and alert. Cooperative. No acute distress. HEAD: Normocephalic. Atraumatic. EYES: Right eye exam: Lids are normal. Sclera is without injection. Cornea is transparent without opacity. The iris is symmetric, round and regular. Clear detail and uniform pigmentation. The pupil is round, regular and equal to the right. Pupil responded quickly to direct and consensual light. Drainage: none. Left eye exam: Lids are normal. Sclera is without injection. Cornea is transparent without opacity. The iris is symmetric, round and regular. Clear detail and uniform pigmentation. The pupil is round, regular and equal to the right. Pupil responded quickly to direct and consensual light. Drainage: none. ENT: Mucous membranes are moist.   NECK: Supple. Normal ROM. CHEST: Equal symmetric chest rise. Regular rate and rhythm. LUNGS: Breathing is unlabored. Speaking comfortably in full sentences. Abdomen: Non-distended. EXTREMITIES: Moving all extremities equally and appropriately. No acute deformities. SKIN: Warm and dry. Very mild erythema to the dorsal surface of the right forearm. No open wounds. NEUROLOGICAL: Alert and oriented. Strength is 5/5 in all extremities and sensation is intact. LABS  No results found for this visit on 09/04/19. RADIOLOGY  No results found. PROCEDURE:   Tetracaine was administered to the right eye, followed by fluorescein stain. The eye was examined with ultraviolet lamp. Corneal abrasion: none. All of the conjunctival surfaces were examined and the upper eyelid was flipped with a sterile cotton swab and examined underneath. Foreign body: none. No hyphema or hypopyon. Patient tolerated procedure well. ED COURSE/MDM  Patient seen and evaluated. Old records reviewed. I have evaluated this patient. My supervising physician was available for consultation. Valerie Medellin presented to the ED today with above noted complaints.  Physical exam to the bilateral eyes is (e.g., ORBITAL CELLULITIS OR ABSCESS), GLAUCOMA, MENINGITIS, PENETRATING GLOBE INJURY, or RETINAL DETACHMENT, thus I consider the discharge disposition reasonable. Also, there is no evidence or peritonitis, sepsis, or toxicity. Shiva Nelson and I have discussed the diagnosis and risks, and we agree with discharging home to follow-up with their primary doctor. We also discussed returning to the Emergency Department immediately if new or worsening symptoms occur. We have discussed the symptoms which are most concerning (e.g., changing or worsening pain, vision changes, neck stiffness or fever) that necessitate immediate return. Clincal Impression  1. Chemical exposure of eye    2. Chemical exposure        Discharge Vital Signs:  Blood pressure (!) 154/88, pulse 81, temperature 97.9 °F (36.6 °C), temperature source Oral, resp. rate 16, weight 300 lb (136.1 kg), SpO2 96 %. [phone] [facility]  [phone]  Call in 1 day  For further evaluation    [address] [place] [address]  [phone]    Call today  As needed    [facility]  ED  [address]  Go to   If symptoms worsen      DISPOSITION  Patient was discharged to home in good condition. Comment: Please note this report has been produced using speech recognition software and may contain errors related to that system including errors in grammar, punctuation, and spelling, as well as words and phrases that may be inappropriate. If there are any questions or concerns please feel free to contact the dictating provider for clarification.         ARAMIS Rebolledo - MILTON  09/04/19 2014

## 2019-09-04 NOTE — ED NOTES
Chemical exposure. Pt states \"'I was putting some Antifreeze into a hot car I took the lid off it spewed out hitting me in the right eye and a drop of Antifreeze went int my mouth I rinsed it out several times/and I also have some redness on my right arm from the antifreeze/no vision changes/I feel a little nauseated but I thinks that's just from being nervous about what happened\".       Isaac Hamilton, JÚNIOR  08/75/13 6058

## 2020-05-07 ENCOUNTER — HOSPITAL ENCOUNTER (EMERGENCY)
Age: 50
Discharge: HOME | End: 2020-05-07
Payer: COMMERCIAL

## 2020-05-07 VITALS
HEIGHT: 72 IN | RESPIRATION RATE: 16 BRPM | HEART RATE: 87 BPM | DIASTOLIC BLOOD PRESSURE: 80 MMHG | SYSTOLIC BLOOD PRESSURE: 135 MMHG | BODY MASS INDEX: 39.96 KG/M2 | OXYGEN SATURATION: 97 % | WEIGHT: 295 LBS | TEMPERATURE: 98.1 F

## 2020-05-07 PROCEDURE — 6370000000 HC RX 637 (ALT 250 FOR IP): Performed by: NURSE PRACTITIONER

## 2020-05-07 PROCEDURE — 99283 EMERGENCY DEPT VISIT LOW MDM: CPT

## 2020-05-07 RX ORDER — LIDOCAINE 4 G/G
1 PATCH TOPICAL ONCE
Status: DISCONTINUED | OUTPATIENT
Start: 2020-05-07 | End: 2020-05-07 | Stop reason: HOSPADM

## 2020-05-07 RX ORDER — CYCLOBENZAPRINE HCL 10 MG
10 TABLET ORAL 3 TIMES DAILY PRN
Qty: 21 TABLET | Refills: 0 | Status: SHIPPED | OUTPATIENT
Start: 2020-05-07 | End: 2020-05-07 | Stop reason: SDUPTHER

## 2020-05-07 RX ORDER — CYCLOBENZAPRINE HCL 10 MG
10 TABLET ORAL 3 TIMES DAILY PRN
Qty: 21 TABLET | Refills: 0 | Status: SHIPPED | OUTPATIENT
Start: 2020-05-07 | End: 2020-05-17

## 2020-05-07 RX ORDER — TRAMADOL HYDROCHLORIDE 50 MG/1
50 TABLET ORAL EVERY 6 HOURS PRN
COMMUNITY
End: 2020-08-24

## 2020-05-07 NOTE — ED NOTES
Bed: 26  Expected date:   Expected time:   Means of arrival:   Comments:     Ayah Mcclelland RN  05/07/20 7225

## 2020-05-08 NOTE — ED PROVIDER NOTES
History:   Diagnosis Date    Anxiety     Chronic back pain     Chronic neck pain     Degenerative disc disease     Degenerative disc disease     Degenerative disc disease     Diverticulitis     Hypertension     Manic depression ([facility] [address]     Obesity     Scoliosis      Past Surgical History:   Procedure Laterality Date    KIDNEY STONE SURGERY       History reviewed. No pertinent family history. Social History     Socioeconomic History    Marital status:      Spouse name: Not on file    Number of children: Not on file    Years of education: Not on file    Highest education level: Not on file   Occupational History    Not on file   Social Needs    Financial resource strain: Not on file    Food insecurity     Worry: Not on file     Inability: Not on file    Transportation needs     Medical: Not on file     Non-medical: Not on file   Tobacco Use    Smoking status: Current Every Day Smoker     Packs/day: 0.50     Years: 25.00     Pack years: 12.50     Types: Cigarettes    Smokeless tobacco: Never Used   Substance and Sexual Activity    Alcohol use: Yes     Comment: OCC     Drug use: No    Sexual activity: Yes     Partners: Female   Lifestyle    Physical activity     Days per week: Not on file     Minutes per session: Not on file    Stress: Not on file   Relationships    Social connections     Talks on phone: Not on file     Gets together: Not on file     Attends Religion service: Not on file     Active member of club or organization: Not on file     Attends meetings of clubs or organizations: Not on file     Relationship status: Not on file    Intimate partner violence     Fear of current or ex partner: Not on file     Emotionally abused: Not on file     Physically abused: Not on file     Forced sexual activity: Not on file   Other Topics Concern    Not on file   Social History Narrative    Not on file     No current facility-administered medications for this encounter.       Current strength (5/5 in all extremities) and sensation is intact. PSYCHIATRIC: Mood and affect appropriate. Speech is clear and articulate. LABS  No results found for this visit on 05/07/20. RADIOLOGY  No results found. ED COURSE/MDM  Patient seen and evaluated. Old records reviewed. I have evaluated this patient. My supervising physician was available for consultation. Varsha Tabor presented to the ED today with above noted complaints. Physical exam does not reveal thoracic or lumbar midline spine tenderness to palpation. There is tenderness just medial to the scapula. This pain is reproducible with palpation and not over any significant bony area. I feel the patient symptoms are most likely muscular in nature and possibly due to muscle spasm. Patient has only been given steroids has not been put on any muscle relaxers. I did give the patient a referral for primary care and advised him to establish care. I am placing a lidocaine patch to his back and discharging him with a prescription for muscle relaxer. I also advised him on follow-up for possible physical therapy however I do not know how soon this would be accomplished due to the current global pandemic and patients are only being seen if considered an emergency. A discussion was had with the patient regarding diagnosis, treatment/ plan of care, and follow up. All questions were answered. Patient will follow up as directed for further evaluation/treatment. The patient was given strict return precautions as we discussed symptoms that would necessitate return to the ED. Patient will return to ED for new/worsening symptoms. The patient verbalized their understanding and agreement with the above plan. I estimate there is LOW risk for ABDOMINAL AORTIC ANEURYSM, CAUDA EQUINA SYNDROME, EPIDURAL MASS LESION, SPINAL STENOSIS, OR HERNIATED DISK CAUSING SEVERE STENOSIS, thus I consider the discharge disposition reasonable.  Varsha Tabor and

## 2020-06-04 ENCOUNTER — APPOINTMENT (OUTPATIENT)
Dept: GENERAL RADIOLOGY | Age: 50
DRG: 198 | End: 2020-06-04
Payer: COMMERCIAL

## 2020-06-04 ENCOUNTER — HOSPITAL ENCOUNTER (INPATIENT)
Age: 50
LOS: 1 days | Discharge: HOME | DRG: 198 | End: 2020-06-05
Attending: EMERGENCY MEDICINE | Admitting: INTERNAL MEDICINE
Payer: COMMERCIAL

## 2020-06-04 PROBLEM — E66.01 MORBID OBESITY (HCC): Status: ACTIVE | Noted: 2020-06-04

## 2020-06-04 PROBLEM — R00.0 TACHYCARDIA: Status: ACTIVE | Noted: 2020-06-04

## 2020-06-04 LAB
A/G RATIO: 1.9 (ref 1.1–2.2)
ALBUMIN SERPL-MCNC: 4.6 G/DL (ref 3.4–5)
ALP BLD-CCNC: 112 U/L (ref 40–129)
ALT SERPL-CCNC: 35 U/L (ref 10–40)
ANION GAP SERPL CALCULATED.3IONS-SCNC: 11 MMOL/L (ref 3–16)
AST SERPL-CCNC: 19 U/L (ref 15–37)
BASOPHILS ABSOLUTE: 0.2 K/UL (ref 0–0.2)
BASOPHILS RELATIVE PERCENT: 1.4 %
BILIRUB SERPL-MCNC: <0.2 MG/DL (ref 0–1)
BUN BLDV-MCNC: 12 MG/DL (ref 7–20)
CALCIUM SERPL-MCNC: 9.4 MG/DL (ref 8.3–10.6)
CHLORIDE BLD-SCNC: 102 MMOL/L (ref 99–110)
CO2: 26 MMOL/L (ref 21–32)
CREAT SERPL-MCNC: 0.8 MG/DL (ref 0.9–1.3)
EKG ATRIAL RATE: 110 BPM
EKG DIAGNOSIS: NORMAL
EKG P AXIS: 62 DEGREES
EKG P-R INTERVAL: 174 MS
EKG Q-T INTERVAL: 334 MS
EKG QRS DURATION: 86 MS
EKG QTC CALCULATION (BAZETT): 452 MS
EKG R AXIS: 42 DEGREES
EKG T AXIS: 24 DEGREES
EKG VENTRICULAR RATE: 110 BPM
EOSINOPHILS ABSOLUTE: 0.2 K/UL (ref 0–0.6)
EOSINOPHILS RELATIVE PERCENT: 1.8 %
GFR AFRICAN AMERICAN: >60
GFR NON-AFRICAN AMERICAN: >60
GLOBULIN: 2.4 G/DL
GLUCOSE BLD-MCNC: 139 MG/DL (ref 70–99)
HCT VFR BLD CALC: 42 % (ref 40.5–52.5)
HEMOGLOBIN: 14.6 G/DL (ref 13.5–17.5)
LYMPHOCYTES ABSOLUTE: 3.4 K/UL (ref 1–5.1)
LYMPHOCYTES RELATIVE PERCENT: 29.2 %
MCH RBC QN AUTO: 34.3 PG (ref 26–34)
MCHC RBC AUTO-ENTMCNC: 34.7 G/DL (ref 31–36)
MCV RBC AUTO: 99 FL (ref 80–100)
MONOCYTES ABSOLUTE: 0.7 K/UL (ref 0–1.3)
MONOCYTES RELATIVE PERCENT: 6.3 %
NEUTROPHILS ABSOLUTE: 7.1 K/UL (ref 1.7–7.7)
NEUTROPHILS RELATIVE PERCENT: 61.3 %
PDW BLD-RTO: 13 % (ref 12.4–15.4)
PLATELET # BLD: 307 K/UL (ref 135–450)
PMV BLD AUTO: 6.8 FL (ref 5–10.5)
POTASSIUM SERPL-SCNC: 3.6 MMOL/L (ref 3.5–5.1)
RBC # BLD: 4.24 M/UL (ref 4.2–5.9)
SODIUM BLD-SCNC: 139 MMOL/L (ref 136–145)
TOTAL PROTEIN: 7 G/DL (ref 6.4–8.2)
TROPONIN: <0.01 NG/ML
WBC # BLD: 11.6 K/UL (ref 4–11)

## 2020-06-04 PROCEDURE — 80053 COMPREHEN METABOLIC PANEL: CPT

## 2020-06-04 PROCEDURE — 1200000000 HC SEMI PRIVATE

## 2020-06-04 PROCEDURE — 36415 COLL VENOUS BLD VENIPUNCTURE: CPT

## 2020-06-04 PROCEDURE — 96372 THER/PROPH/DIAG INJ SC/IM: CPT

## 2020-06-04 PROCEDURE — 93005 ELECTROCARDIOGRAM TRACING: CPT | Performed by: EMERGENCY MEDICINE

## 2020-06-04 PROCEDURE — 84484 ASSAY OF TROPONIN QUANT: CPT

## 2020-06-04 PROCEDURE — 85025 COMPLETE CBC W/AUTO DIFF WBC: CPT

## 2020-06-04 PROCEDURE — G0378 HOSPITAL OBSERVATION PER HR: HCPCS

## 2020-06-04 PROCEDURE — 6360000002 HC RX W HCPCS: Performed by: EMERGENCY MEDICINE

## 2020-06-04 PROCEDURE — 6370000000 HC RX 637 (ALT 250 FOR IP): Performed by: INTERNAL MEDICINE

## 2020-06-04 PROCEDURE — 99285 EMERGENCY DEPT VISIT HI MDM: CPT

## 2020-06-04 PROCEDURE — 6360000002 HC RX W HCPCS: Performed by: INTERNAL MEDICINE

## 2020-06-04 PROCEDURE — 93010 ELECTROCARDIOGRAM REPORT: CPT | Performed by: INTERNAL MEDICINE

## 2020-06-04 PROCEDURE — 6370000000 HC RX 637 (ALT 250 FOR IP): Performed by: EMERGENCY MEDICINE

## 2020-06-04 PROCEDURE — 71045 X-RAY EXAM CHEST 1 VIEW: CPT

## 2020-06-04 RX ORDER — PROMETHAZINE HYDROCHLORIDE 25 MG/1
12.5 TABLET ORAL EVERY 6 HOURS PRN
Status: DISCONTINUED | OUTPATIENT
Start: 2020-06-04 | End: 2020-06-05 | Stop reason: HOSPADM

## 2020-06-04 RX ORDER — ASPIRIN 81 MG/1
324 TABLET, CHEWABLE ORAL ONCE
Status: COMPLETED | OUTPATIENT
Start: 2020-06-04 | End: 2020-06-04

## 2020-06-04 RX ORDER — MORPHINE SULFATE 4 MG/ML
4 INJECTION, SOLUTION INTRAMUSCULAR; INTRAVENOUS EVERY 30 MIN PRN
Status: DISCONTINUED | OUTPATIENT
Start: 2020-06-04 | End: 2020-06-05 | Stop reason: HOSPADM

## 2020-06-04 RX ORDER — ASPIRIN 81 MG/1
81 TABLET, CHEWABLE ORAL DAILY
Status: DISCONTINUED | OUTPATIENT
Start: 2020-06-05 | End: 2020-06-05 | Stop reason: HOSPADM

## 2020-06-04 RX ORDER — ONDANSETRON 2 MG/ML
4 INJECTION INTRAMUSCULAR; INTRAVENOUS EVERY 6 HOURS PRN
Status: DISCONTINUED | OUTPATIENT
Start: 2020-06-04 | End: 2020-06-05 | Stop reason: HOSPADM

## 2020-06-04 RX ORDER — ONDANSETRON 2 MG/ML
4 INJECTION INTRAMUSCULAR; INTRAVENOUS EVERY 30 MIN PRN
Status: DISCONTINUED | OUTPATIENT
Start: 2020-06-04 | End: 2020-06-04 | Stop reason: SDUPTHER

## 2020-06-04 RX ORDER — LISINOPRIL 20 MG/1
20 TABLET ORAL DAILY
Status: DISCONTINUED | OUTPATIENT
Start: 2020-06-04 | End: 2020-06-05 | Stop reason: HOSPADM

## 2020-06-04 RX ORDER — TRAMADOL HYDROCHLORIDE 50 MG/1
50 TABLET ORAL EVERY 4 HOURS PRN
Status: DISCONTINUED | OUTPATIENT
Start: 2020-06-04 | End: 2020-06-05 | Stop reason: HOSPADM

## 2020-06-04 RX ORDER — SODIUM CHLORIDE 0.9 % (FLUSH) 0.9 %
10 SYRINGE (ML) INJECTION PRN
Status: DISCONTINUED | OUTPATIENT
Start: 2020-06-04 | End: 2020-06-05 | Stop reason: HOSPADM

## 2020-06-04 RX ORDER — FAMOTIDINE 20 MG/1
20 TABLET, FILM COATED ORAL 2 TIMES DAILY
Status: DISCONTINUED | OUTPATIENT
Start: 2020-06-04 | End: 2020-06-05 | Stop reason: HOSPADM

## 2020-06-04 RX ORDER — ACETAMINOPHEN 650 MG/1
650 SUPPOSITORY RECTAL EVERY 6 HOURS PRN
Status: DISCONTINUED | OUTPATIENT
Start: 2020-06-04 | End: 2020-06-05 | Stop reason: HOSPADM

## 2020-06-04 RX ORDER — NITROGLYCERIN 0.4 MG/1
0.4 TABLET SUBLINGUAL EVERY 5 MIN PRN
Status: DISCONTINUED | OUTPATIENT
Start: 2020-06-04 | End: 2020-06-04 | Stop reason: SDUPTHER

## 2020-06-04 RX ORDER — ACETAMINOPHEN 325 MG/1
650 TABLET ORAL EVERY 6 HOURS PRN
Status: DISCONTINUED | OUTPATIENT
Start: 2020-06-04 | End: 2020-06-05 | Stop reason: HOSPADM

## 2020-06-04 RX ORDER — ATORVASTATIN CALCIUM 40 MG/1
40 TABLET, FILM COATED ORAL NIGHTLY
Status: DISCONTINUED | OUTPATIENT
Start: 2020-06-04 | End: 2020-06-05 | Stop reason: HOSPADM

## 2020-06-04 RX ORDER — LISINOPRIL AND HYDROCHLOROTHIAZIDE 25; 20 MG/1; MG/1
1 TABLET ORAL DAILY
Status: DISCONTINUED | OUTPATIENT
Start: 2020-06-04 | End: 2020-06-04 | Stop reason: CLARIF

## 2020-06-04 RX ORDER — POLYETHYLENE GLYCOL 3350 17 G/17G
17 POWDER, FOR SOLUTION ORAL DAILY PRN
Status: DISCONTINUED | OUTPATIENT
Start: 2020-06-04 | End: 2020-06-05 | Stop reason: HOSPADM

## 2020-06-04 RX ORDER — HYDROCHLOROTHIAZIDE 25 MG/1
25 TABLET ORAL DAILY
Status: DISCONTINUED | OUTPATIENT
Start: 2020-06-04 | End: 2020-06-05 | Stop reason: HOSPADM

## 2020-06-04 RX ORDER — NITROGLYCERIN 0.4 MG/1
0.4 TABLET SUBLINGUAL EVERY 5 MIN PRN
Status: DISCONTINUED | OUTPATIENT
Start: 2020-06-04 | End: 2020-06-05 | Stop reason: HOSPADM

## 2020-06-04 RX ORDER — SODIUM CHLORIDE 0.9 % (FLUSH) 0.9 %
10 SYRINGE (ML) INJECTION EVERY 12 HOURS SCHEDULED
Status: DISCONTINUED | OUTPATIENT
Start: 2020-06-04 | End: 2020-06-05 | Stop reason: HOSPADM

## 2020-06-04 RX ADMIN — FAMOTIDINE 20 MG: 20 TABLET, FILM COATED ORAL at 23:37

## 2020-06-04 RX ADMIN — ENOXAPARIN SODIUM 40 MG: 40 INJECTION SUBCUTANEOUS at 15:58

## 2020-06-04 RX ADMIN — ATORVASTATIN CALCIUM 40 MG: 40 TABLET, FILM COATED ORAL at 23:38

## 2020-06-04 RX ADMIN — NITROGLYCERIN 0.5 INCH: 20 OINTMENT TOPICAL at 13:08

## 2020-06-04 RX ADMIN — ASPIRIN 81 MG 324 MG: 81 TABLET ORAL at 10:06

## 2020-06-04 RX ADMIN — NITROGLYCERIN 0.4 MG: 0.4 TABLET, ORALLY DISINTEGRATING SUBLINGUAL at 10:06

## 2020-06-04 ASSESSMENT — ENCOUNTER SYMPTOMS
FACIAL SWELLING: 0
SINUS PRESSURE: 0
EYE REDNESS: 0
SHORTNESS OF BREATH: 1
BLOOD IN STOOL: 0
SORE THROAT: 0
ABDOMINAL DISTENTION: 0
ANAL BLEEDING: 0
EYE PAIN: 0
DIARRHEA: 0
ABDOMINAL PAIN: 0
NAUSEA: 0
EYE DISCHARGE: 0
PHOTOPHOBIA: 0
BACK PAIN: 0
RHINORRHEA: 0
COUGH: 0
EYE ITCHING: 0
VOMITING: 0
CONSTIPATION: 0
CHEST TIGHTNESS: 0
TROUBLE SWALLOWING: 0
WHEEZING: 0
VOICE CHANGE: 0
STRIDOR: 0

## 2020-06-04 ASSESSMENT — PAIN DESCRIPTION - LOCATION: LOCATION: CHEST

## 2020-06-04 ASSESSMENT — PAIN SCALES - GENERAL
PAINLEVEL_OUTOF10: 0
PAINLEVEL_OUTOF10: 3
PAINLEVEL_OUTOF10: 5

## 2020-06-04 ASSESSMENT — PAIN DESCRIPTION - FREQUENCY: FREQUENCY: INTERMITTENT

## 2020-06-04 ASSESSMENT — PAIN DESCRIPTION - PAIN TYPE: TYPE: ACUTE PAIN

## 2020-06-04 ASSESSMENT — PAIN DESCRIPTION - DESCRIPTORS: DESCRIPTORS: CRAMPING

## 2020-06-04 NOTE — ED PROVIDER NOTES
Colleen Bethea is a 47year old male who developed mid-sternal chest pain after waking up this morning. Pain does not radiate. He was also short of breath and diaphoretic. He initially attributed this to reflux, but he states he has never had chest pain this bad. He has had some back issues recently, but has not had chest pain. He has HTN, a family history of CAD (mother), and is a smoker. He reports the pain as 4/10 currently. He recently had some \"tests\" done on his heart, but he has never had an angiogram.        Blood pressure (!) 186/110, pulse 109, temperature 98.6 °F (37 °C), temperature source Oral, resp. rate 16, height 6' (1.829 m), weight 295 lb (133.8 kg), SpO2 99 %. I have reviewed the following from the nursing documentation:      Prior to Admission medications    Medication Sig Start Date End Date Taking? Authorizing Provider   traMADol (ULTRAM) 50 MG tablet Take 50 mg by mouth every 6 hours as needed for Pain. Yes Historical Provider, MD   lisinopril-hydrochlorothiazide (PRINZIDE;ZESTORETIC) 20-25 MG per tablet Take 1 tablet by mouth daily 3/13/19  Yes ARAMIS Overton - CNP   aspirin 81 MG chewable tablet Take 1 tablet by mouth daily. 8/19/14  Yes Carmen Cordova MD       Allergies as of 06/04/2020 - Review Complete 06/04/2020   Allergen Reaction Noted    Ciprofloxacin Itching 06/03/2015       Past Medical History:   Diagnosis Date    Anxiety     Chronic back pain     Chronic neck pain     Degenerative disc disease     Degenerative disc disease     Degenerative disc disease     Diverticulitis     Hypertension     Manic depression ([address]     Obesity     Scoliosis         Surgical History:   Past Surgical History:   Procedure Laterality Date    KIDNEY STONE SURGERY          Family History:  History reviewed. No pertinent family history.     Social History     Socioeconomic History    Marital status:      Spouse name: Not on file    Number of children: Not on file tenderness. There is no guarding or rebound. Musculoskeletal: Normal range of motion. General: No tenderness. Lymphadenopathy:      Cervical: No cervical adenopathy. Skin:     General: Skin is warm and dry. Coloration: Skin is not pale. Findings: No erythema or rash. Neurological:      Mental Status: He is alert and oriented to person, place, and time. Cranial Nerves: No cranial nerve deficit. Motor: No abnormal muscle tone. Coordination: Coordination normal.      Deep Tendon Reflexes: Reflexes are normal and symmetric. Reflexes normal.   Psychiatric:         Behavior: Behavior normal.         Thought Content:  Thought content normal.         Judgment: Judgment normal.          Procedures     MDM   Results for orders placed or performed during the hospital encounter of 06/04/20   CBC Auto Differential   Result Value Ref Range    WBC 11.6 (H) 4.0 - 11.0 K/uL    RBC 4.24 4.20 - 5.90 M/uL    Hemoglobin 14.6 13.5 - 17.5 g/dL    Hematocrit 42.0 40.5 - 52.5 %    MCV 99.0 80.0 - 100.0 fL    MCH 34.3 (H) 26.0 - 34.0 pg    MCHC 34.7 31.0 - 36.0 g/dL    RDW 13.0 12.4 - 15.4 %    Platelets 651 968 - 762 K/uL    MPV 6.8 5.0 - 10.5 fL    Neutrophils % 61.3 %    Lymphocytes % 29.2 %    Monocytes % 6.3 %    Eosinophils % 1.8 %    Basophils % 1.4 %    Neutrophils Absolute 7.1 1.7 - 7.7 K/uL    Lymphocytes Absolute 3.4 1.0 - 5.1 K/uL    Monocytes Absolute 0.7 0.0 - 1.3 K/uL    Eosinophils Absolute 0.2 0.0 - 0.6 K/uL    Basophils Absolute 0.2 0.0 - 0.2 K/uL   Comprehensive Metabolic Panel   Result Value Ref Range    Sodium 139 136 - 145 mmol/L    Potassium 3.6 3.5 - 5.1 mmol/L    Chloride 102 99 - 110 mmol/L    CO2 26 21 - 32 mmol/L    Anion Gap 11 3 - 16    Glucose 139 (H) 70 - 99 mg/dL    BUN 12 7 - 20 mg/dL    CREATININE 0.8 (L) 0.9 - 1.3 mg/dL    GFR Non-African American >60 >60    GFR African American >60 >60    Calcium 9.4 8.3 - 10.6 mg/dL    Total Protein 7.0 6.4 - 8.2 g/dL    Alb 4.6

## 2020-06-04 NOTE — ED NOTES
Jose [email or web address]  Re:[email or web address].  Chest pain per [email or web address]  06/04/20 1411

## 2020-06-05 ENCOUNTER — APPOINTMENT (OUTPATIENT)
Dept: NUCLEAR MEDICINE | Age: 50
DRG: 198 | End: 2020-06-05
Payer: COMMERCIAL

## 2020-06-05 VITALS
HEIGHT: 72 IN | BODY MASS INDEX: 39.25 KG/M2 | OXYGEN SATURATION: 95 % | TEMPERATURE: 98.6 F | WEIGHT: 289.8 LBS | HEART RATE: 80 BPM | DIASTOLIC BLOOD PRESSURE: 79 MMHG | RESPIRATION RATE: 16 BRPM | SYSTOLIC BLOOD PRESSURE: 127 MMHG

## 2020-06-05 LAB
CHOLESTEROL, TOTAL: 150 MG/DL (ref 0–199)
EKG ATRIAL RATE: 96 BPM
EKG DIAGNOSIS: NORMAL
EKG P AXIS: 75 DEGREES
EKG P-R INTERVAL: 162 MS
EKG Q-T INTERVAL: 344 MS
EKG QRS DURATION: 80 MS
EKG QTC CALCULATION (BAZETT): 434 MS
EKG R AXIS: 76 DEGREES
EKG T AXIS: 53 DEGREES
EKG VENTRICULAR RATE: 96 BPM
HCT VFR BLD CALC: 44 % (ref 40.5–52.5)
HDLC SERPL-MCNC: 37 MG/DL (ref 40–60)
HEMOGLOBIN: 15 G/DL (ref 13.5–17.5)
LDL CHOLESTEROL CALCULATED: 87 MG/DL
LV EF: 73 %
LVEF MODALITY: NORMAL
MCH RBC QN AUTO: 33.9 PG (ref 26–34)
MCHC RBC AUTO-ENTMCNC: 34.1 G/DL (ref 31–36)
MCV RBC AUTO: 99.4 FL (ref 80–100)
PDW BLD-RTO: 13.2 % (ref 12.4–15.4)
PLATELET # BLD: 305 K/UL (ref 135–450)
PMV BLD AUTO: 6.9 FL (ref 5–10.5)
RBC # BLD: 4.43 M/UL (ref 4.2–5.9)
TRIGL SERPL-MCNC: 131 MG/DL (ref 0–150)
VLDLC SERPL CALC-MCNC: 26 MG/DL
WBC # BLD: 10.6 K/UL (ref 4–11)

## 2020-06-05 PROCEDURE — 6370000000 HC RX 637 (ALT 250 FOR IP): Performed by: INTERNAL MEDICINE

## 2020-06-05 PROCEDURE — 80061 LIPID PANEL: CPT

## 2020-06-05 PROCEDURE — G0378 HOSPITAL OBSERVATION PER HR: HCPCS

## 2020-06-05 PROCEDURE — 85027 COMPLETE CBC AUTOMATED: CPT

## 2020-06-05 PROCEDURE — 2580000003 HC RX 258: Performed by: INTERNAL MEDICINE

## 2020-06-05 PROCEDURE — 93017 CV STRESS TEST TRACING ONLY: CPT

## 2020-06-05 PROCEDURE — 3430000000 HC RX DIAGNOSTIC RADIOPHARMACEUTICAL: Performed by: INTERNAL MEDICINE

## 2020-06-05 PROCEDURE — 93010 ELECTROCARDIOGRAM REPORT: CPT | Performed by: INTERNAL MEDICINE

## 2020-06-05 PROCEDURE — A9502 TC99M TETROFOSMIN: HCPCS | Performed by: INTERNAL MEDICINE

## 2020-06-05 PROCEDURE — 93005 ELECTROCARDIOGRAM TRACING: CPT | Performed by: INTERNAL MEDICINE

## 2020-06-05 PROCEDURE — 6360000002 HC RX W HCPCS: Performed by: INTERNAL MEDICINE

## 2020-06-05 PROCEDURE — 78452 HT MUSCLE IMAGE SPECT MULT: CPT

## 2020-06-05 PROCEDURE — 36415 COLL VENOUS BLD VENIPUNCTURE: CPT

## 2020-06-05 RX ADMIN — TETROFOSMIN 10.1 MILLICURIE: 1.38 INJECTION, POWDER, LYOPHILIZED, FOR SOLUTION INTRAVENOUS at 07:50

## 2020-06-05 RX ADMIN — TETROFOSMIN 33.1 MILLICURIE: 1.38 INJECTION, POWDER, LYOPHILIZED, FOR SOLUTION INTRAVENOUS at 08:55

## 2020-06-05 RX ADMIN — LISINOPRIL 20 MG: 20 TABLET ORAL at 10:21

## 2020-06-05 RX ADMIN — ASPIRIN 81 MG 81 MG: 81 TABLET ORAL at 10:21

## 2020-06-05 RX ADMIN — REGADENOSON 0.4 MG: 0.08 INJECTION, SOLUTION INTRAVENOUS at 08:55

## 2020-06-05 RX ADMIN — FAMOTIDINE 20 MG: 20 TABLET, FILM COATED ORAL at 10:21

## 2020-06-05 RX ADMIN — HYDROCHLOROTHIAZIDE 25 MG: 25 TABLET ORAL at 10:21

## 2020-06-05 RX ADMIN — Medication 10 ML: at 10:22

## 2020-06-05 ASSESSMENT — PAIN SCALES - GENERAL
PAINLEVEL_OUTOF10: 0

## 2020-06-05 NOTE — PLAN OF CARE
Problem: Pain:  Goal: Pain level will decrease  Description: Pain level will decrease  6/5/2020 0543 by Jeff Mariee RN  Outcome: Ongoing  Problem: Safety:  Goal: Free from accidental physical injury  Description: Free from accidental physical injury  Outcome: Ongoing

## 2020-06-05 NOTE — H&P
INR in the last 72 hours. Recent Labs     06/04/20  0857 06/04/20  1539 06/04/20  1930   TROPONINI <0.01 <0.01 <0.01       Urinalysis:      Lab Results   Component Value Date    NITRU Negative 08/10/2018    WBCUA None seen 08/10/2018    BACTERIA Rare 09/10/2017    RBCUA 20-50 08/10/2018    BLOODU LARGE 08/10/2018    SPECGRAV >=1.030 08/10/2018    GLUCOSEU Negative 08/10/2018    GLUCOSEU NEGATIVE 01/18/2012         ASSESSMENT:    Active Hospital Problems    Diagnosis Date Noted    Chest pain [R07.9] 08/19/2014     Priority: High    Morbid obesity ([facility] [address] [E66.01] 06/04/2020    Tachycardia [R00.0] 06/04/2020    HTN (hypertension) [I10] 08/19/2014       PLAN:      Chest pain - Concerning to ED for ACS, etiology clinically unable to determine. Initial enzymes/EKG negative. Followed serial enzymes and monitor on tele, w/out evidence of ischemia/arrythmia. Stress test negative for reversible ischemia. HTN - w/out known CAD and no evidence of active signs/sxs of ischemia/failure. Currently controlled on home meds w/ vitals reviewed and documented as above. Tachycardia - sinus tachycardia of unclear clinical significance. Non-sustained. F/U w/ PCP outpt. Obesity -  With Body mass index is 39.3 kg/m². Complicating assessment and treatment. Placing patient at risk for multiple co-morbidities as well as early death and contributing to the patient's presentation. Counseled on weight loss.          Stephanie Tavera MD

## 2020-06-08 NOTE — DISCHARGE SUMMARY
CHEST PORTABLE   Final Result   No acute cardiopulmonary disease                Consults:     IP CONSULT TO HOSPITALIST    Disposition: home    Condition at Discharge: Stable    Discharge Instructions/Follow-up:  w/ PCP 1-2 weeks and subspecialists as arranged. Code Status:  Full Code    Activity: activity as tolerated    Diet: regular diet      Discharge Medications:     Discharge Medication List as of 6/5/2020 12:37 PM           Details   traMADol (ULTRAM) 50 MG tablet Take 50 mg by mouth every 6 hours as needed for Pain. Historical Med      lisinopril-hydrochlorothiazide (PRINZIDE;ZESTORETIC) 20-25 MG per tablet Take 1 tablet by mouth daily, Disp-30 tablet, R-0Print      aspirin 81 MG chewable tablet Take 1 tablet by mouth daily. , Disp-30 tablet, R-3             Time Spent on discharge is more than 30 minutes in the examination, evaluation, counseling and review of medications and discharge plan.       Signed:    Carlos Hunt MD   6/8/2020

## 2020-08-24 ENCOUNTER — APPOINTMENT (OUTPATIENT)
Dept: GENERAL RADIOLOGY | Age: 50
End: 2020-08-24
Payer: COMMERCIAL

## 2020-08-24 ENCOUNTER — HOSPITAL ENCOUNTER (EMERGENCY)
Age: 50
Discharge: HOME | End: 2020-08-25
Payer: COMMERCIAL

## 2020-08-24 ENCOUNTER — APPOINTMENT (OUTPATIENT)
Dept: CT IMAGING | Age: 50
End: 2020-08-24
Payer: COMMERCIAL

## 2020-08-24 LAB
A/G RATIO: 1.8 (ref 1.1–2.2)
ALBUMIN SERPL-MCNC: 4.8 G/DL (ref 3.4–5)
ALP BLD-CCNC: 112 U/L (ref 40–129)
ALT SERPL-CCNC: 54 U/L (ref 10–40)
ANION GAP SERPL CALCULATED.3IONS-SCNC: 12 MMOL/L (ref 3–16)
AST SERPL-CCNC: 31 U/L (ref 15–37)
BACTERIA: ABNORMAL /HPF
BASOPHILS ABSOLUTE: 0.1 K/UL (ref 0–0.2)
BASOPHILS RELATIVE PERCENT: 0.5 %
BILIRUB SERPL-MCNC: <0.2 MG/DL (ref 0–1)
BILIRUBIN URINE: NEGATIVE
BLOOD, URINE: ABNORMAL
BUN BLDV-MCNC: 21 MG/DL (ref 7–20)
CALCIUM SERPL-MCNC: 9.8 MG/DL (ref 8.3–10.6)
CHLORIDE BLD-SCNC: 94 MMOL/L (ref 99–110)
CLARITY: CLEAR
CO2: 26 MMOL/L (ref 21–32)
COLOR: YELLOW
CREAT SERPL-MCNC: 0.8 MG/DL (ref 0.9–1.3)
EOSINOPHILS ABSOLUTE: 0.2 K/UL (ref 0–0.6)
EOSINOPHILS RELATIVE PERCENT: 1.7 %
GFR AFRICAN AMERICAN: >60
GFR NON-AFRICAN AMERICAN: >60
GLOBULIN: 2.7 G/DL
GLUCOSE BLD-MCNC: 101 MG/DL (ref 70–99)
GLUCOSE URINE: NEGATIVE MG/DL
HCT VFR BLD CALC: 47.4 % (ref 40.5–52.5)
HEMOGLOBIN: 16.4 G/DL (ref 13.5–17.5)
KETONES, URINE: NEGATIVE MG/DL
LEUKOCYTE ESTERASE, URINE: NEGATIVE
LIPASE: 45 U/L (ref 13–60)
LYMPHOCYTES ABSOLUTE: 2.6 K/UL (ref 1–5.1)
LYMPHOCYTES RELATIVE PERCENT: 18.5 %
MCH RBC QN AUTO: 33.8 PG (ref 26–34)
MCHC RBC AUTO-ENTMCNC: 34.5 G/DL (ref 31–36)
MCV RBC AUTO: 97.8 FL (ref 80–100)
MICROSCOPIC EXAMINATION: YES
MONOCYTES ABSOLUTE: 0.9 K/UL (ref 0–1.3)
MONOCYTES RELATIVE PERCENT: 6.2 %
NEUTROPHILS ABSOLUTE: 10.3 K/UL (ref 1.7–7.7)
NEUTROPHILS RELATIVE PERCENT: 73.1 %
NITRITE, URINE: NEGATIVE
PDW BLD-RTO: 12.5 % (ref 12.4–15.4)
PH UA: 6 (ref 5–8)
PLATELET # BLD: 341 K/UL (ref 135–450)
PMV BLD AUTO: 7.2 FL (ref 5–10.5)
POTASSIUM SERPL-SCNC: 4.2 MMOL/L (ref 3.5–5.1)
PROTEIN UA: NEGATIVE MG/DL
RBC # BLD: 4.85 M/UL (ref 4.2–5.9)
RBC UA: ABNORMAL /HPF (ref 0–4)
SODIUM BLD-SCNC: 132 MMOL/L (ref 136–145)
SPECIFIC GRAVITY UA: 1.02 (ref 1–1.03)
TOTAL PROTEIN: 7.5 G/DL (ref 6.4–8.2)
URINE REFLEX TO CULTURE: ABNORMAL
URINE TYPE: ABNORMAL
UROBILINOGEN, URINE: 0.2 E.U./DL
WBC # BLD: 14.1 K/UL (ref 4–11)
WBC UA: ABNORMAL /HPF (ref 0–5)

## 2020-08-24 PROCEDURE — 80053 COMPREHEN METABOLIC PANEL: CPT

## 2020-08-24 PROCEDURE — 72072 X-RAY EXAM THORAC SPINE 3VWS: CPT

## 2020-08-24 PROCEDURE — 6360000002 HC RX W HCPCS: Performed by: PHYSICIAN ASSISTANT

## 2020-08-24 PROCEDURE — 81001 URINALYSIS AUTO W/SCOPE: CPT

## 2020-08-24 PROCEDURE — 96374 THER/PROPH/DIAG INJ IV PUSH: CPT

## 2020-08-24 PROCEDURE — 83690 ASSAY OF LIPASE: CPT

## 2020-08-24 PROCEDURE — 74177 CT ABD & PELVIS W/CONTRAST: CPT

## 2020-08-24 PROCEDURE — 2580000003 HC RX 258: Performed by: PHYSICIAN ASSISTANT

## 2020-08-24 PROCEDURE — 99283 EMERGENCY DEPT VISIT LOW MDM: CPT

## 2020-08-24 PROCEDURE — 96375 TX/PRO/DX INJ NEW DRUG ADDON: CPT

## 2020-08-24 PROCEDURE — 84153 ASSAY OF PSA TOTAL: CPT

## 2020-08-24 PROCEDURE — 71046 X-RAY EXAM CHEST 2 VIEWS: CPT

## 2020-08-24 PROCEDURE — 6360000004 HC RX CONTRAST MEDICATION: Performed by: PHYSICIAN ASSISTANT

## 2020-08-24 PROCEDURE — 85025 COMPLETE CBC W/AUTO DIFF WBC: CPT

## 2020-08-24 RX ORDER — 0.9 % SODIUM CHLORIDE 0.9 %
1000 INTRAVENOUS SOLUTION INTRAVENOUS ONCE
Status: COMPLETED | OUTPATIENT
Start: 2020-08-24 | End: 2020-08-25

## 2020-08-24 RX ORDER — ORPHENADRINE CITRATE 30 MG/ML
60 INJECTION INTRAMUSCULAR; INTRAVENOUS ONCE
Status: COMPLETED | OUTPATIENT
Start: 2020-08-24 | End: 2020-08-24

## 2020-08-24 RX ORDER — KETOROLAC TROMETHAMINE 30 MG/ML
15 INJECTION, SOLUTION INTRAMUSCULAR; INTRAVENOUS ONCE
Status: COMPLETED | OUTPATIENT
Start: 2020-08-24 | End: 2020-08-24

## 2020-08-24 RX ADMIN — ORPHENADRINE CITRATE 60 MG: 30 INJECTION INTRAMUSCULAR; INTRAVENOUS at 23:29

## 2020-08-24 RX ADMIN — IOPAMIDOL 75 ML: 755 INJECTION, SOLUTION INTRAVENOUS at 23:59

## 2020-08-24 RX ADMIN — KETOROLAC TROMETHAMINE 15 MG: 30 INJECTION, SOLUTION INTRAMUSCULAR at 23:28

## 2020-08-24 RX ADMIN — SODIUM CHLORIDE 1000 ML: 9 INJECTION, SOLUTION INTRAVENOUS at 23:28

## 2020-08-24 ASSESSMENT — PAIN SCALES - GENERAL
PAINLEVEL_OUTOF10: 8
PAINLEVEL_OUTOF10: 7

## 2020-08-25 VITALS
SYSTOLIC BLOOD PRESSURE: 124 MMHG | HEIGHT: 72 IN | OXYGEN SATURATION: 97 % | HEART RATE: 80 BPM | BODY MASS INDEX: 39.96 KG/M2 | WEIGHT: 295 LBS | DIASTOLIC BLOOD PRESSURE: 98 MMHG | TEMPERATURE: 98.9 F | RESPIRATION RATE: 16 BRPM

## 2020-08-25 LAB — PROSTATE SPECIFIC ANTIGEN: 1.17 NG/ML (ref 0–4)

## 2020-08-25 PROCEDURE — 6370000000 HC RX 637 (ALT 250 FOR IP): Performed by: PHYSICIAN ASSISTANT

## 2020-08-25 RX ORDER — TAMSULOSIN HYDROCHLORIDE 0.4 MG/1
0.4 CAPSULE ORAL DAILY
Qty: 30 CAPSULE | Refills: 0 | Status: SHIPPED | OUTPATIENT
Start: 2020-08-25 | End: 2020-12-19 | Stop reason: SDUPTHER

## 2020-08-25 RX ORDER — TAMSULOSIN HYDROCHLORIDE 0.4 MG/1
0.4 CAPSULE ORAL ONCE
Status: COMPLETED | OUTPATIENT
Start: 2020-08-25 | End: 2020-08-25

## 2020-08-25 RX ORDER — AMOXICILLIN AND CLAVULANATE POTASSIUM 875; 125 MG/1; MG/1
1 TABLET, FILM COATED ORAL ONCE
Status: COMPLETED | OUTPATIENT
Start: 2020-08-25 | End: 2020-08-25

## 2020-08-25 RX ORDER — IBUPROFEN 600 MG/1
600 TABLET ORAL
Qty: 40 TABLET | Refills: 0 | Status: SHIPPED | OUTPATIENT
Start: 2020-08-25 | End: 2021-05-11

## 2020-08-25 RX ORDER — METHOCARBAMOL 750 MG/1
750 TABLET, FILM COATED ORAL
Qty: 40 TABLET | Refills: 0 | Status: SHIPPED | OUTPATIENT
Start: 2020-08-25 | End: 2020-09-04

## 2020-08-25 RX ORDER — ORPHENADRINE CITRATE 100 MG/1
100 TABLET, EXTENDED RELEASE ORAL 2 TIMES DAILY
Qty: 20 TABLET | Refills: 0 | Status: SHIPPED | OUTPATIENT
Start: 2020-08-25 | End: 2020-09-04

## 2020-08-25 RX ORDER — AMOXICILLIN AND CLAVULANATE POTASSIUM 875; 125 MG/1; MG/1
1 TABLET, FILM COATED ORAL 2 TIMES DAILY
Qty: 20 TABLET | Refills: 0 | Status: SHIPPED | OUTPATIENT
Start: 2020-08-25 | End: 2020-09-04

## 2020-08-25 RX ORDER — LISINOPRIL AND HYDROCHLOROTHIAZIDE 25; 20 MG/1; MG/1
1 TABLET ORAL DAILY
Qty: 30 TABLET | Refills: 0 | Status: SHIPPED | OUTPATIENT
Start: 2020-08-25 | End: 2021-05-11 | Stop reason: SDUPTHER

## 2020-08-25 RX ADMIN — AMOXICILLIN AND CLAVULANATE POTASSIUM 1 TABLET: 875; 125 TABLET, FILM COATED ORAL at 01:03

## 2020-08-25 RX ADMIN — TAMSULOSIN HYDROCHLORIDE 0.4 MG: 0.4 CAPSULE ORAL at 01:03

## 2020-08-25 NOTE — ED PROVIDER NOTES
Hypertension     Manic depression ([facility] [address]     Obesity     Scoliosis          SURGICAL HISTORY     Past Surgical History:   Procedure Laterality Date    KIDNEY STONE SURGERY           CURRENTMEDICATIONS       Previous Medications    ASPIRIN 81 MG CHEWABLE TABLET    Take 1 tablet by mouth daily. ALLERGIES     Ciprofloxacin    FAMILYHISTORY     History reviewed. No pertinent family history. SOCIAL HISTORY       Social History     Tobacco Use    Smoking status: Current Every Day Smoker     Packs/day: 0.50     Years: 25.00     Pack years: 12.50     Types: Cigarettes    Smokeless tobacco: Never Used   Substance Use Topics    Alcohol use: Yes     Comment: OCC     Drug use: No       SCREENINGS             PHYSICAL EXAM    (up to 7 for level 4, 8 or more for level 5)     ED Triage Vitals [08/24/20 2206]   BP Temp Temp Source Pulse Resp SpO2 Height Weight   (!) 150/87 98.9 °F (37.2 °C) Oral 116 20 95 % 6' (1.829 m) 295 lb (133.8 kg)       Physical Exam  Vitals signs and nursing note reviewed. Constitutional:       Appearance: Normal appearance. He is well-developed. He is obese. HENT:      Head: Normocephalic and atraumatic. Right Ear: External ear normal.      Left Ear: External ear normal.      Mouth/Throat:      Comments: Patient with 4 remaining teeth on the upper. Tooth #7, 8, 9 and 10. These are severely decayed. Eyes:      General: No scleral icterus. Right eye: No discharge. Left eye: No discharge. Conjunctiva/sclera: Conjunctivae normal.   Neck:      Musculoskeletal: Normal range of motion and neck supple. Cardiovascular:      Rate and Rhythm: Normal rate and regular rhythm. Heart sounds: Normal heart sounds. Pulmonary:      Effort: Pulmonary effort is normal.      Breath sounds: Normal breath sounds. Abdominal:      General: Abdomen is flat. Bowel sounds are normal.      Palpations: Abdomen is soft. Tenderness: There is no abdominal tenderness. There is no right CVA tenderness or left CVA tenderness. Musculoskeletal: Normal range of motion. General: Tenderness present. Comments: Patient does exhibit tenderness when I apply pressure and stress the right rhomboid structure. Skin:     General: Skin is warm and dry. Neurological:      General: No focal deficit present. Mental Status: He is alert and oriented to person, place, and time. Mental status is at baseline. Psychiatric:         Mood and Affect: Mood normal.         Behavior: Behavior normal.         Thought Content:  Thought content normal.         Judgment: Judgment normal.         DIAGNOSTIC RESULTS   LABS:    Labs Reviewed   CBC WITH AUTO DIFFERENTIAL - Abnormal; Notable for the following components:       Result Value    WBC 14.1 (*)     Neutrophils Absolute 10.3 (*)     All other components within normal limits    Narrative:     Performed at:  [place] [address] [facility]   Phone [phone]   COMPREHENSIVE METABOLIC PANEL - Abnormal; Notable for the following components:    Sodium 132 (*)     Chloride 94 (*)     Glucose 101 (*)     BUN 21 (*)     CREATININE 0.8 (*)     ALT 54 (*)     All other components within normal limits    Narrative:     Performed at:  [place] [address] [facility]   Phone [phone]   URINE RT REFLEX TO CULTURE - Abnormal; Notable for the following components:    Blood, Urine SMALL (*)     All other components within normal limits    Narrative:     Performed at:  [facility]) [place] [address] [employer]   Phone [phone]   MICROSCOPIC URINALYSIS - Abnormal; Notable for the following components:    Bacteria, UA 1+ (*)     All other components within normal limits    Narrative:     Performed at:  [address], [facility]   Phone [phone]   LIPASE    Narrative:     Performed at:  [facility]) [address],  [place], [address] [place] [address]   Phone [phone]   PSA PROSTATIC SPECIFIC ANTIGEN       All other labs were within normal range or not returned as of this dictation. EKG: All EKG's are interpreted by the Emergency Department Physician in the absence of a cardiologist.  Please see their note for interpretation of EKG. RADIOLOGY:   Non-plain film images such as CT, Ultrasound and MRI are read by the radiologist. Plain radiographic images are visualized and preliminarily interpreted by the ED Provider with the below findings:        Interpretation per the Radiologist below, if available at the time of this note:    CT ABDOMEN PELVIS W IV CONTRAST Additional Contrast? None   Final Result   Negative CT examination. No evidence of acute process in the abdomen or   pelvis. XR THORACIC SPINE (3 VIEWS)   Final Result   No acute bony abnormality         XR CHEST (2 VW)   Final Result   No acute disease. Xr Chest (2 Vw)    Result Date: 8/24/2020  EXAMINATION: TWO XRAY VIEWS OF THE CHEST 8/24/2020 10:54 pm COMPARISON: 06/04/2020 HISTORY: ORDERING SYSTEM PROVIDED HISTORY: Chest Discomfort TECHNOLOGIST PROVIDED HISTORY: Reason for exam:->Chest Discomfort Reason for Exam: Dental Pain (Patient comes into ED with c/o infection in gum that he states now thinks is making him dizzy and breaking out into sweats. Denies chills or fever. ) FINDINGS: No acute bony abnormality. A remote fracture of the right posterolateral rib is unchanged. The heart size and mediastinal contours are normal.  The lungs are clear. No acute disease.      Xr Thoracic Spine (3 Views)    Result Date: 8/24/2020  EXAMINATION: THREE XRAY VIEWS OF THE THORACIC SPINE 8/24/2020 10:54 pm COMPARISON: Prior studies including PA and lateral chest radiograph May 2019 HISTORY: ORDERING SYSTEM PROVIDED HISTORY: pain TECHNOLOGIST PROVIDED HISTORY: Reason for exam:->pain Reason for Exam: upper back pain x couple months FINDINGS: Evaluation of upper thoracic vertebral bodies is limited on the lateral projection due to overlapping structures. There is minimal curvature of the mid to upper thoracic spine with concavity to the left. The alignment of the thoracic spine is normal and there is preservation of vertebral body heights. Mild multilevel degenerative disc disease identified with disc space loss is and anterior spondylosis. No acute fracture subluxation. No focal bony lesion or destructive bony process. The paraspinal soft tissues show no acute abnormality. No acute bony abnormality     Ct Abdomen Pelvis W Iv Contrast Additional Contrast? None    Result Date: 8/25/2020  EXAMINATION: CT OF THE ABDOMEN AND PELVIS WITH CONTRAST 8/24/2020 11:46 pm TECHNIQUE: CT of the abdomen and pelvis was performed with the administration of intravenous contrast. Multiplanar reformatted images are provided for review. Dose modulation, iterative reconstruction, and/or weight based adjustment of the mA/kV was utilized to reduce the radiation dose to as low as reasonably achievable. COMPARISON: August 10, 2018 HISTORY: ORDERING SYSTEM PROVIDED HISTORY: left upper quad pain TECHNOLOGIST PROVIDED HISTORY: If patient is on cardiac monitor and/or pulse ox, they may be taken off cardiac monitor and pulse ox, left on O2 if currently on. All monitors reattached when patient returns to room. Additional Contrast?->None Reason for exam:->left upper quad pain Reason for Exam: luq pain x 2 days Acuity: Unknown Type of Exam: Unknown FINDINGS: Lower Chest: No evidence of pneumonia or other acute findings. Organs: No acute abnormality of the organs of the abdomen. No evidence of pancreatitis. No ureteral stone or hydronephrosis. No evidence of pyelonephritis. There is hepatic steatosis. Renal cysts again noted incidentally. Normal appearance of the spleen. Gallbladder appears normal, mostly collapsed. GI/Bowel: No bowel obstruction or other acute process demonstrated. No evidence of colitis, diverticulitis or appendicitis. There is sigmoid diverticulosis Pelvis: No acute abnormality of the pelvis. No evidence of cystitis. Peritoneum/Retroperitoneum: No free air, ascites or abscess. Bones/Soft Tissues: No fracture or other acute osseous process. Negative CT examination. No evidence of acute process in the abdomen or pelvis. PROCEDURES   Unless otherwise noted below, none     Procedures    CRITICAL CARE TIME   N/A    CONSULTS:  None      EMERGENCY DEPARTMENT COURSE and DIFFERENTIAL DIAGNOSIS/MDM:   Vitals:    Vitals:    08/24/20 2206   BP: (!) 150/87   Pulse: 116   Resp: 20   Temp: 98.9 °F (37.2 °C)   TempSrc: Oral   SpO2: 95%   Weight: 295 lb (133.8 kg)   Height: 6' (1.829 m)       Patient was given the following medications:  Medications   amoxicillin-clavulanate (AUGMENTIN) 875-125 MG per tablet 1 tablet (has no administration in time range)   tamsulosin (FLOMAX) capsule 0.4 mg (has no administration in time range)   0.9 % sodium chloride bolus (1,000 mLs Intravenous New Bag 8/24/20 2328)   ketorolac (TORADOL) injection 15 mg (15 mg Intravenous Given 8/24/20 2328)   orphenadrine (NORFLEX) injection 60 mg (60 mg Intravenous Given 8/24/20 2329)   iopamidol (ISOVUE-370) 76 % injection 75 mL (75 mLs Intravenous Given 8/24/20 2359)           Patient laboratory studies are obtained. The patient WBC 14.1 with a very subtle left shift. Patient's CMP shows normal renal and hepatic function. Blood sugar 101. Lipase normal at 45. Patient urinalysis shows small blood which has been seen previously. No evidence of UTI. I did obtain chest x-ray as well as x-rays of T-spine showing no acute pathology or lytic lesions. Abdominal pelvic CT scan with IV contrast is obtained showing no acute process. The patient does have active dental infection.   Discontinue clindamycin because of GI effect. Start Augmentin 875 mg twice daily. Patient has symptoms consistent with BPH. I will start Flomax 0.4 nightly beginning tonight. First dose given in the emergency room. In the emergency room patient is given Norflex and Toradol with excellent results regarding the right rhomboid complaint. I will send him home with ibuprofen 60 mg 3 times daily. I did prescribe Norflex. Should this not be covered by his insurance plan he is then to fill Robaxin as prescribed. Heat and massage recommended. He is to follow-up with his healthcare provider regarding his hypertension. I did refill his lisinopril/HCTZ. He is to follow with urology regarding his BPH symptoms. Patient will follow with dentist as discussed and he states he will see the same individual the family sees. The patient does express understanding of his diagnosis and the treatment plan. FINAL IMPRESSION      1. Dental caries    2. Pain, dental    3. Rhomboid muscle strain, initial encounter    4. Benign prostatic hyperplasia with lower urinary tract symptoms, symptom details unspecified    5.  Elevated blood pressure reading without diagnosis of hypertension          DISPOSITION/PLAN   DISPOSITION Decision To Discharge 08/25/2020 12:30:13 AM      PATIENT REFERREDTO:  Danya Hardwick MD  [facility]. [address].  [phone]    Schedule an appointment as soon as possible for a visit in 1 week      [facility]) Pre-Services  [phone]          DISCHARGE MEDICATIONS:  New Prescriptions    AMOXICILLIN-CLAVULANATE (AUGMENTIN) 875-125 MG PER TABLET    Take 1 tablet by mouth 2 times daily for 10 days    IBUPROFEN (ADVIL;MOTRIN) 600 MG TABLET    Take 1 tablet by mouth 3 times daily (with meals)    METHOCARBAMOL (ROBAXIN-750) 750 MG TABLET    Take 1 tablet by mouth every 6-8 hours as needed (Spasm)    ORPHENADRINE (NORFLEX) 100 MG EXTENDED RELEASE TABLET    Take 1 tablet by mouth 2 times daily for 10 days TAMSULOSIN (FLOMAX) 0.4 MG CAPSULE    Take 1 capsule by mouth daily       DISCONTINUED MEDICATIONS:  Discontinued Medications    IBUPROFEN (IBU) 600 MG TABLET    Take 1 tablet by mouth every 6 hours as needed for Pain. TRAMADOL (ULTRAM) 50 MG TABLET    Take 50 mg by mouth every 6 hours as needed for Pain. (Please note that portions of this note were completed with a voice recognition program.  Efforts were made to edit the dictations but occasionally words are mis-transcribed. )    Cari Mcclelland PA-C (electronically signed)           Cari Mcclelland PA-C  08/25/20 9594

## 2020-12-19 ENCOUNTER — HOSPITAL ENCOUNTER (EMERGENCY)
Age: 50
Discharge: HOME | End: 2020-12-19
Attending: EMERGENCY MEDICINE
Payer: COMMERCIAL

## 2020-12-19 VITALS
SYSTOLIC BLOOD PRESSURE: 121 MMHG | TEMPERATURE: 98.1 F | HEART RATE: 89 BPM | BODY MASS INDEX: 39.28 KG/M2 | WEIGHT: 290 LBS | RESPIRATION RATE: 16 BRPM | HEIGHT: 72 IN | OXYGEN SATURATION: 96 % | DIASTOLIC BLOOD PRESSURE: 73 MMHG

## 2020-12-19 LAB
BILIRUBIN URINE: NEGATIVE
BLOOD, URINE: ABNORMAL
CLARITY: CLEAR
COLOR: YELLOW
EPITHELIAL CELLS, UA: ABNORMAL /HPF (ref 0–5)
GLUCOSE URINE: NEGATIVE MG/DL
KETONES, URINE: NEGATIVE MG/DL
LEUKOCYTE ESTERASE, URINE: NEGATIVE
MICROSCOPIC EXAMINATION: YES
NITRITE, URINE: NEGATIVE
PH UA: 6 (ref 5–8)
PROTEIN UA: NEGATIVE MG/DL
RBC UA: ABNORMAL /HPF (ref 0–4)
SPECIFIC GRAVITY UA: <=1.005 (ref 1–1.03)
URINE REFLEX TO CULTURE: ABNORMAL
URINE TYPE: ABNORMAL
UROBILINOGEN, URINE: 0.2 E.U./DL
WBC UA: ABNORMAL /HPF (ref 0–5)

## 2020-12-19 PROCEDURE — 6360000002 HC RX W HCPCS: Performed by: EMERGENCY MEDICINE

## 2020-12-19 PROCEDURE — 81001 URINALYSIS AUTO W/SCOPE: CPT

## 2020-12-19 PROCEDURE — 99285 EMERGENCY DEPT VISIT HI MDM: CPT

## 2020-12-19 PROCEDURE — 6370000000 HC RX 637 (ALT 250 FOR IP): Performed by: EMERGENCY MEDICINE

## 2020-12-19 RX ORDER — DIPHENHYDRAMINE HCL 25 MG
25 TABLET ORAL ONCE
Status: COMPLETED | OUTPATIENT
Start: 2020-12-19 | End: 2020-12-19

## 2020-12-19 RX ORDER — FAMOTIDINE 20 MG/1
20 TABLET, FILM COATED ORAL ONCE
Status: COMPLETED | OUTPATIENT
Start: 2020-12-19 | End: 2020-12-19

## 2020-12-19 RX ORDER — DIPHENHYDRAMINE HCL 25 MG
25 CAPSULE ORAL EVERY 6 HOURS PRN
Qty: 5 CAPSULE | Refills: 0 | Status: SHIPPED | OUTPATIENT
Start: 2020-12-19 | End: 2020-12-24

## 2020-12-19 RX ORDER — TAMSULOSIN HYDROCHLORIDE 0.4 MG/1
0.4 CAPSULE ORAL DAILY
Qty: 30 CAPSULE | Refills: 0 | Status: SHIPPED | OUTPATIENT
Start: 2020-12-19 | End: 2020-12-24

## 2020-12-19 RX ORDER — DEXAMETHASONE 4 MG/1
8 TABLET ORAL ONCE
Status: COMPLETED | OUTPATIENT
Start: 2020-12-19 | End: 2020-12-19

## 2020-12-19 RX ADMIN — DEXAMETHASONE 8 MG: 4 TABLET ORAL at 04:11

## 2020-12-19 RX ADMIN — DIPHENHYDRAMINE HCL 25 MG: 25 TABLET ORAL at 04:11

## 2020-12-19 RX ADMIN — FAMOTIDINE 20 MG: 20 TABLET, FILM COATED ORAL at 04:11

## 2020-12-19 ASSESSMENT — ENCOUNTER SYMPTOMS
WHEEZING: 0
NAUSEA: 0
STRIDOR: 0
COLOR CHANGE: 0
SORE THROAT: 1
BACK PAIN: 0
BLOOD IN STOOL: 0
VOMITING: 0
PHOTOPHOBIA: 0
FACIAL SWELLING: 0
ABDOMINAL PAIN: 1
VOICE CHANGE: 0
TROUBLE SWALLOWING: 0
SHORTNESS OF BREATH: 0

## 2020-12-19 ASSESSMENT — PAIN SCALES - GENERAL: PAINLEVEL_OUTOF10: 7

## 2020-12-19 ASSESSMENT — PAIN DESCRIPTION - LOCATION: LOCATION: THROAT

## 2020-12-19 ASSESSMENT — PAIN DESCRIPTION - DESCRIPTORS: DESCRIPTORS: SORE

## 2020-12-19 ASSESSMENT — PAIN DESCRIPTION - PAIN TYPE: TYPE: ACUTE PAIN

## 2020-12-19 NOTE — ED NOTES
Discharge instructions reviewed with patient. Reviewed prescriptions with patient. No additional questions asked. Voiced understanding. Encouraged patient to follow up as discussed by the ED physician.      Harleen Nobles RN  12/19/20 9012

## 2020-12-19 NOTE — ED PROVIDER NOTES
[address] [facility]  ED  eMERGENCY dEPARTMENT eNCOUnter      Pt Name: Maryjane Berry  MRN: [id number]  [place] 1970  Date of evaluation: 12/19/2020  Provider: Zahra Parsons MD    CHIEF COMPLAINT       Chief Complaint   Patient presents with    Allergic Reaction     took 1/2 of tramadol 3 hours ago from old prescription for c/o lower abdominal pain. now feels like throat is closing and trouble getting deep breath. HISTORY OF PRESENT ILLNESS   (Location/Symptom, Timing/Onset, Context/Setting, Quality, Duration, Modifying Factors, Severity)  Note limiting factors. Maryjane Berry is a 45 y.o. male reports a history of hematuria and enlarged prostate is currently taking Flomax who presents with concern for possible allergic reaction. The patient reports that he has had some mild lower abdominal pain and difficulty urinating that he attributes to being out of his Flomax medication. He reports he took half a tramadol 3 hours prior to arrival and reports that he felt like his throat was closing he was having trouble breathing. Denies any tongue swelling. Eyes any fever. Eyes any inability to eat or drink. He reports his abdominal pain has since completely resolved. He reports that his symptoms are moderate, constant, and unchanged. HPI    Nursing Notes were reviewed. REVIEW OFSYSTEMS    (2-9 systems for level 4, 10 or more for level 5)     Review of Systems   Constitutional: Negative for appetite change, fever and unexpected weight change. HENT: Positive for sore throat. Negative for facial swelling, trouble swallowing and voice change. Eyes: Negative for photophobia and visual disturbance. Respiratory: Negative for shortness of breath, wheezing and stridor. Cardiovascular: Negative for chest pain and palpitations. Gastrointestinal: Positive for abdominal pain. Negative for blood in stool, nausea and vomiting. Genitourinary: Positive for difficulty urinating.  Negative for dysuria. Musculoskeletal: Negative for back pain, gait problem and neck pain. Skin: Negative for color change and wound. Neurological: Negative for seizures, syncope and speech difficulty. Psychiatric/Behavioral: Negative for self-injury and suicidal ideas. Except as noted above the remainder of the review of systems was reviewed and negative. PAST MEDICAL HISTORY     Past Medical History:   Diagnosis Date    Anxiety     Chronic back pain     Chronic neck pain     Degenerative disc disease     Degenerative disc disease     Degenerative disc disease     Diverticulitis     Hypertension     Manic depression ([facility] [address]     Obesity     Scoliosis          SURGICAL HISTORY       Past Surgical History:   Procedure Laterality Date    KIDNEY STONE SURGERY           CURRENT MEDICATIONS       Discharge Medication List as of 12/19/2020  5:16 AM      CONTINUE these medications which have NOT CHANGED    Details   lisinopril-hydroCHLOROthiazide (PRINZIDE;ZESTORETIC) 20-25 MG per tablet Take 1 tablet by mouth daily, Disp-30 tablet,R-0Print      ibuprofen (ADVIL;MOTRIN) 600 MG tablet Take 1 tablet by mouth 3 times daily (with meals), Disp-40 tablet,R-0Print      aspirin 81 MG chewable tablet Take 1 tablet by mouth daily. , Disp-30 tablet, R-3             ALLERGIES     Ciprofloxacin    FAMILY HISTORY     History reviewed. No pertinent family history.        SOCIAL HISTORY       Social History     Socioeconomic History    Marital status:      Spouse name: None    Number of children: None    Years of education: None    Highest education level: None   Occupational History    None   Social Needs    Financial resource strain: None    Food insecurity     Worry: None     Inability: None    Transportation needs     Medical: None     Non-medical: None   Tobacco Use    Smoking status: Current Every Day Smoker     Packs/day: 0.50     Years: 25.00     Pack years: 12.50     Types: Cigarettes    Smokeless tobacco: Never Used   Substance and Sexual Activity    Alcohol use: Yes     Comment: OCC     Drug use: No    Sexual activity: Yes     Partners: Female   Lifestyle    Physical activity     Days per week: None     Minutes per session: None    Stress: None   Relationships    Social connections     Talks on phone: None     Gets together: None     Attends Buddhist service: None     Active member of club or organization: None     Attends meetings of clubs or organizations: None     Relationship status: None    Intimate partner violence     Fear of current or ex partner: None     Emotionally abused: None     Physically abused: None     Forced sexual activity: None   Other Topics Concern    None   Social History Narrative    None         PHYSICAL EXAM    (up to 7 for level 4, 8 or more for level 5)     ED Triage Vitals   BP Temp Temp Source Pulse Resp SpO2 Height Weight   12/19/20 0341 12/19/20 0341 12/19/20 0341 12/19/20 0258 12/19/20 0341 12/19/20 0258 12/19/20 0341 12/19/20 0341   (!) 144/88 98.3 °F (36.8 °C) Oral 107 16 98 % 6' (1.829 m) 290 lb (131.5 kg)       Physical Exam  Vitals signs and nursing note reviewed. Constitutional:       General: He is not in acute distress. Appearance: He is well-developed. Comments: Pleasant and cooperative. Nontoxic-appearing. In no acute distress. HENT:      Head: Normocephalic and atraumatic. Right Ear: External ear normal.      Left Ear: External ear normal.      Mouth/Throat:      Mouth: Mucous membranes are moist.      Pharynx: Oropharynx is clear. No posterior oropharyngeal erythema. Comments: Uvula midline. No swelling of the lips, tongue, or uvula. Patient breathing normally, speaking normally, no acute distress. No signs of impending airway obstruction. Eyes:      Conjunctiva/sclera: Conjunctivae normal.      Pupils: Pupils are equal, round, and reactive to light. Neck:      Musculoskeletal: Neck supple.       Vascular: No signs of anaphylaxis, angioedema, or impending airway compromise. The patient is given Pepcid, Benadryl, and steroids and is observed for a couple hours in the emergency department. Upon frequent rechecks he does have intermittent subjective symptoms and objectively he continues to have no emergent findings. Urinalysis does show mild hematuria but no evidence of acute infection the patient is able to urinate with no signs of emergent urinary obstruction. Patient's abdomen is soft and he has no abdominal tenderness to palpation on exam I do not suspect acute abdomen. I feel the patient is appropriate for Benadryl, Flomax, and strict ER return precautions for any new or worsening symptoms. He is referred to a primary care clinic. The patient expresses understanding and agreement with this plan and is discharged home. I estimate there is low risk for acute appendicitis, bowl obstruction, cholecystitis, diverticulitis, incarcerated hernia, mesenteric ischemia, pancreatitis, perforated bowl or ulcer, or abdominal aortic aneurism, thus I consider the discharge disposition reasonable. Also, there is no evidence or peritonitis, sepsis or toxicity. We have discussed the diagnosis and risks and agree with discharging home to follow-up with their primary doctor. We also discussed returning to the Emergency Department immediately if new or worsening symptoms occur and have discussed the symptoms which are most concerning (e.g., bloody stool, fever, changing or worsening pain, vomiting) that necessitate immediate return. Procedures    FINAL IMPRESSION      1. Allergic reaction, initial encounter    2.  Hematuria, unspecified type          DISPOSITION/PLAN   DISPOSITION Decision To Discharge 12/19/2020 05:15:00 AM      PATIENT REFERRED TO:  [facility]  [address] [facility], [address] [place] [address] Street  [facility] [place] [address] [phone]  In 3 days      [facility]  ED  [address] [facility] [address]  [phone]    If symptoms worsen      DISCHARGE MEDICATIONS:  Discharge Medication List as of 12/19/2020  5:16 AM      START taking these medications    Details   diphenhydrAMINE (BENADRYL) 25 MG capsule Take 1 capsule by mouth every 6 hours as needed for Allergies, Disp-5 capsule, R-0Print                (Please note that portions of this note were completed with a voice recognition program.  Efforts were made to edit the dictations but occasionally words aremis-transcribed. )    Roxana Vallecillo MD (electronically signed)  Attending Emergency Physician           Roxana Vallecillo MD  12/19/20 3653

## 2020-12-19 NOTE — ED NOTES
Pt ambulatory, able to speak in full sentences. No visible distress.  States has felt short of breath since taking half dose of Tramadol 3 hours prior to arrival.     Sourav Dahl RN  12/19/20 0109

## 2020-12-24 ENCOUNTER — HOSPITAL ENCOUNTER (EMERGENCY)
Age: 50
Discharge: HOME | End: 2020-12-24
Attending: EMERGENCY MEDICINE
Payer: COMMERCIAL

## 2020-12-24 ENCOUNTER — APPOINTMENT (OUTPATIENT)
Dept: CT IMAGING | Age: 50
End: 2020-12-24
Payer: COMMERCIAL

## 2020-12-24 VITALS
SYSTOLIC BLOOD PRESSURE: 109 MMHG | OXYGEN SATURATION: 97 % | BODY MASS INDEX: 39.96 KG/M2 | WEIGHT: 295 LBS | RESPIRATION RATE: 18 BRPM | HEIGHT: 72 IN | DIASTOLIC BLOOD PRESSURE: 76 MMHG | HEART RATE: 105 BPM | TEMPERATURE: 98.8 F

## 2020-12-24 LAB
A/G RATIO: 1.8 (ref 1.1–2.2)
ALBUMIN SERPL-MCNC: 4.8 G/DL (ref 3.4–5)
ALP BLD-CCNC: 102 U/L (ref 40–129)
ALT SERPL-CCNC: 32 U/L (ref 10–40)
ANION GAP SERPL CALCULATED.3IONS-SCNC: 11 MMOL/L (ref 3–16)
ANISOCYTOSIS: ABNORMAL
AST SERPL-CCNC: 24 U/L (ref 15–37)
ATYPICAL LYMPHOCYTE RELATIVE PERCENT: 3 % (ref 0–6)
BACTERIA: ABNORMAL /HPF
BANDED NEUTROPHILS RELATIVE PERCENT: 10 % (ref 0–7)
BASOPHILS ABSOLUTE: 0.1 K/UL (ref 0–0.2)
BASOPHILS RELATIVE PERCENT: 1 %
BILIRUB SERPL-MCNC: 0.3 MG/DL (ref 0–1)
BILIRUBIN URINE: NEGATIVE
BLOOD, URINE: ABNORMAL
BUN BLDV-MCNC: 27 MG/DL (ref 7–20)
CALCIUM SERPL-MCNC: 9.3 MG/DL (ref 8.3–10.6)
CHLORIDE BLD-SCNC: 94 MMOL/L (ref 99–110)
CLARITY: CLEAR
CO2: 25 MMOL/L (ref 21–32)
COLOR: YELLOW
CREAT SERPL-MCNC: 1.1 MG/DL (ref 0.9–1.3)
EOSINOPHILS ABSOLUTE: 0.4 K/UL (ref 0–0.6)
EOSINOPHILS RELATIVE PERCENT: 3 %
GFR AFRICAN AMERICAN: >60
GFR NON-AFRICAN AMERICAN: >60
GLOBULIN: 2.6 G/DL
GLUCOSE BLD-MCNC: 112 MG/DL (ref 70–99)
GLUCOSE URINE: NEGATIVE MG/DL
HCT VFR BLD CALC: 42.3 % (ref 40.5–52.5)
HEMOGLOBIN: 14.6 G/DL (ref 13.5–17.5)
HYALINE CASTS: ABNORMAL /LPF (ref 0–2)
KETONES, URINE: NEGATIVE MG/DL
LEUKOCYTE ESTERASE, URINE: NEGATIVE
LYMPHOCYTES ABSOLUTE: 2.8 K/UL (ref 1–5.1)
LYMPHOCYTES RELATIVE PERCENT: 17 %
MCH RBC QN AUTO: 34.2 PG (ref 26–34)
MCHC RBC AUTO-ENTMCNC: 34.6 G/DL (ref 31–36)
MCV RBC AUTO: 99 FL (ref 80–100)
MICROSCOPIC EXAMINATION: YES
MONOCYTES ABSOLUTE: 0.6 K/UL (ref 0–1.3)
MONOCYTES RELATIVE PERCENT: 4 %
NEUTROPHILS ABSOLUTE: 10 K/UL (ref 1.7–7.7)
NEUTROPHILS RELATIVE PERCENT: 62 %
NITRITE, URINE: NEGATIVE
PDW BLD-RTO: 12.8 % (ref 12.4–15.4)
PH UA: 6 (ref 5–8)
PLATELET # BLD: 317 K/UL (ref 135–450)
PMV BLD AUTO: 6.6 FL (ref 5–10.5)
POIKILOCYTES: ABNORMAL
POTASSIUM REFLEX MAGNESIUM: 3.7 MMOL/L (ref 3.5–5.1)
PROTEIN UA: NEGATIVE MG/DL
RBC # BLD: 4.27 M/UL (ref 4.2–5.9)
RBC UA: ABNORMAL /HPF (ref 0–4)
SODIUM BLD-SCNC: 130 MMOL/L (ref 136–145)
SPECIFIC GRAVITY UA: 1.02 (ref 1–1.03)
TOTAL PROTEIN: 7.4 G/DL (ref 6.4–8.2)
URINE REFLEX TO CULTURE: ABNORMAL
URINE TYPE: ABNORMAL
UROBILINOGEN, URINE: 0.2 E.U./DL
WBC # BLD: 13.9 K/UL (ref 4–11)
WBC UA: ABNORMAL /HPF (ref 0–5)

## 2020-12-24 PROCEDURE — 99283 EMERGENCY DEPT VISIT LOW MDM: CPT

## 2020-12-24 PROCEDURE — 81001 URINALYSIS AUTO W/SCOPE: CPT

## 2020-12-24 PROCEDURE — 85025 COMPLETE CBC W/AUTO DIFF WBC: CPT

## 2020-12-24 PROCEDURE — 6360000004 HC RX CONTRAST MEDICATION: Performed by: EMERGENCY MEDICINE

## 2020-12-24 PROCEDURE — 74177 CT ABD & PELVIS W/CONTRAST: CPT

## 2020-12-24 PROCEDURE — 80053 COMPREHEN METABOLIC PANEL: CPT

## 2020-12-24 RX ORDER — TAMSULOSIN HYDROCHLORIDE 0.4 MG/1
0.4 CAPSULE ORAL DAILY
Qty: 30 CAPSULE | Refills: 0 | Status: SHIPPED | OUTPATIENT
Start: 2020-12-24 | End: 2021-05-11

## 2020-12-24 RX ADMIN — IOPAMIDOL 75 ML: 755 INJECTION, SOLUTION INTRAVENOUS at 02:35

## 2020-12-24 ASSESSMENT — PAIN SCALES - GENERAL: PAINLEVEL_OUTOF10: 6

## 2021-03-15 ENCOUNTER — APPOINTMENT (OUTPATIENT)
Dept: CT IMAGING | Age: 51
End: 2021-03-15
Payer: COMMERCIAL

## 2021-03-15 ENCOUNTER — HOSPITAL ENCOUNTER (EMERGENCY)
Age: 51
Discharge: HOME | End: 2021-03-15
Payer: COMMERCIAL

## 2021-03-15 VITALS
BODY MASS INDEX: 39.96 KG/M2 | HEART RATE: 80 BPM | SYSTOLIC BLOOD PRESSURE: 113 MMHG | TEMPERATURE: 98.1 F | OXYGEN SATURATION: 97 % | DIASTOLIC BLOOD PRESSURE: 74 MMHG | WEIGHT: 295 LBS | RESPIRATION RATE: 20 BRPM | HEIGHT: 72 IN

## 2021-03-15 DIAGNOSIS — J43.9 PULMONARY EMPHYSEMA, UNSPECIFIED EMPHYSEMA TYPE (HCC): ICD-10-CM

## 2021-03-15 DIAGNOSIS — M54.2 NECK PAIN: Primary | ICD-10-CM

## 2021-03-15 DIAGNOSIS — R20.2 PARESTHESIA: ICD-10-CM

## 2021-03-15 DIAGNOSIS — R51.9 NONINTRACTABLE HEADACHE, UNSPECIFIED CHRONICITY PATTERN, UNSPECIFIED HEADACHE TYPE: ICD-10-CM

## 2021-03-15 DIAGNOSIS — R68.89 DOES NOT FEEL RIGHT: ICD-10-CM

## 2021-03-15 LAB
A/G RATIO: 1.8 (ref 1.1–2.2)
ALBUMIN SERPL-MCNC: 4.9 G/DL (ref 3.4–5)
ALP BLD-CCNC: 97 U/L (ref 40–129)
ALT SERPL-CCNC: 31 U/L (ref 10–40)
ANION GAP SERPL CALCULATED.3IONS-SCNC: 8 MMOL/L (ref 3–16)
AST SERPL-CCNC: 19 U/L (ref 15–37)
BASOPHILS ABSOLUTE: 0.1 K/UL (ref 0–0.2)
BASOPHILS RELATIVE PERCENT: 0.8 %
BILIRUB SERPL-MCNC: 0.4 MG/DL (ref 0–1)
BILIRUBIN URINE: NEGATIVE
BLOOD, URINE: ABNORMAL
BUN BLDV-MCNC: 20 MG/DL (ref 7–20)
CALCIUM SERPL-MCNC: 9.8 MG/DL (ref 8.3–10.6)
CHLORIDE BLD-SCNC: 99 MMOL/L (ref 99–110)
CLARITY: CLEAR
CO2: 28 MMOL/L (ref 21–32)
COLOR: YELLOW
CREAT SERPL-MCNC: 0.8 MG/DL (ref 0.9–1.3)
EOSINOPHILS ABSOLUTE: 0.2 K/UL (ref 0–0.6)
EOSINOPHILS RELATIVE PERCENT: 1.9 %
EPITHELIAL CELLS, UA: NORMAL /HPF (ref 0–5)
GFR AFRICAN AMERICAN: >60
GFR NON-AFRICAN AMERICAN: >60
GLOBULIN: 2.8 G/DL
GLUCOSE BLD-MCNC: 105 MG/DL (ref 70–99)
GLUCOSE URINE: NEGATIVE MG/DL
HCT VFR BLD CALC: 45.7 % (ref 40.5–52.5)
HEMOGLOBIN: 15.9 G/DL (ref 13.5–17.5)
KETONES, URINE: NEGATIVE MG/DL
LEUKOCYTE ESTERASE, URINE: NEGATIVE
LYMPHOCYTES ABSOLUTE: 2.3 K/UL (ref 1–5.1)
LYMPHOCYTES RELATIVE PERCENT: 23.8 %
MCH RBC QN AUTO: 33.9 PG (ref 26–34)
MCHC RBC AUTO-ENTMCNC: 34.7 G/DL (ref 31–36)
MCV RBC AUTO: 97.6 FL (ref 80–100)
MICROSCOPIC EXAMINATION: YES
MONOCYTES ABSOLUTE: 0.5 K/UL (ref 0–1.3)
MONOCYTES RELATIVE PERCENT: 5.6 %
NEUTROPHILS ABSOLUTE: 6.6 K/UL (ref 1.7–7.7)
NEUTROPHILS RELATIVE PERCENT: 67.9 %
NITRITE, URINE: NEGATIVE
PDW BLD-RTO: 12.9 % (ref 12.4–15.4)
PH UA: 5.5 (ref 5–8)
PLATELET # BLD: 308 K/UL (ref 135–450)
PMV BLD AUTO: 6.7 FL (ref 5–10.5)
POTASSIUM REFLEX MAGNESIUM: 4.2 MMOL/L (ref 3.5–5.1)
PROTEIN UA: NEGATIVE MG/DL
RBC # BLD: 4.68 M/UL (ref 4.2–5.9)
RBC UA: NORMAL /HPF (ref 0–4)
SODIUM BLD-SCNC: 135 MMOL/L (ref 136–145)
SPECIFIC GRAVITY UA: >=1.03 (ref 1–1.03)
TOTAL PROTEIN: 7.7 G/DL (ref 6.4–8.2)
URINE TYPE: ABNORMAL
UROBILINOGEN, URINE: 0.2 E.U./DL
WBC # BLD: 9.7 K/UL (ref 4–11)
WBC UA: NORMAL /HPF (ref 0–5)

## 2021-03-15 PROCEDURE — 70450 CT HEAD/BRAIN W/O DYE: CPT

## 2021-03-15 PROCEDURE — 72125 CT NECK SPINE W/O DYE: CPT

## 2021-03-15 PROCEDURE — 81001 URINALYSIS AUTO W/SCOPE: CPT

## 2021-03-15 PROCEDURE — 96374 THER/PROPH/DIAG INJ IV PUSH: CPT

## 2021-03-15 PROCEDURE — 70498 CT ANGIOGRAPHY NECK: CPT

## 2021-03-15 PROCEDURE — 80053 COMPREHEN METABOLIC PANEL: CPT

## 2021-03-15 PROCEDURE — 96375 TX/PRO/DX INJ NEW DRUG ADDON: CPT

## 2021-03-15 PROCEDURE — 6360000002 HC RX W HCPCS: Performed by: NURSE PRACTITIONER

## 2021-03-15 PROCEDURE — 85025 COMPLETE CBC W/AUTO DIFF WBC: CPT

## 2021-03-15 PROCEDURE — 99283 EMERGENCY DEPT VISIT LOW MDM: CPT

## 2021-03-15 PROCEDURE — 2580000003 HC RX 258: Performed by: NURSE PRACTITIONER

## 2021-03-15 PROCEDURE — 6360000004 HC RX CONTRAST MEDICATION: Performed by: NURSE PRACTITIONER

## 2021-03-15 RX ORDER — KETOROLAC TROMETHAMINE 30 MG/ML
30 INJECTION, SOLUTION INTRAMUSCULAR; INTRAVENOUS ONCE
Status: COMPLETED | OUTPATIENT
Start: 2021-03-15 | End: 2021-03-15

## 2021-03-15 RX ORDER — DIPHENHYDRAMINE HYDROCHLORIDE 50 MG/ML
25 INJECTION INTRAMUSCULAR; INTRAVENOUS ONCE
Status: COMPLETED | OUTPATIENT
Start: 2021-03-15 | End: 2021-03-15

## 2021-03-15 RX ORDER — 0.9 % SODIUM CHLORIDE 0.9 %
1000 INTRAVENOUS SOLUTION INTRAVENOUS ONCE
Status: COMPLETED | OUTPATIENT
Start: 2021-03-15 | End: 2021-03-15

## 2021-03-15 RX ORDER — METOCLOPRAMIDE HYDROCHLORIDE 5 MG/ML
10 INJECTION INTRAMUSCULAR; INTRAVENOUS ONCE
Status: COMPLETED | OUTPATIENT
Start: 2021-03-15 | End: 2021-03-15

## 2021-03-15 RX ADMIN — DIPHENHYDRAMINE HYDROCHLORIDE 25 MG: 50 INJECTION, SOLUTION INTRAMUSCULAR; INTRAVENOUS at 18:06

## 2021-03-15 RX ADMIN — METOCLOPRAMIDE HYDROCHLORIDE 10 MG: 5 INJECTION INTRAMUSCULAR; INTRAVENOUS at 18:06

## 2021-03-15 RX ADMIN — IOPAMIDOL 75 ML: 755 INJECTION, SOLUTION INTRAVENOUS at 18:38

## 2021-03-15 RX ADMIN — KETOROLAC TROMETHAMINE 30 MG: 30 INJECTION, SOLUTION INTRAMUSCULAR at 18:06

## 2021-03-15 RX ADMIN — SODIUM CHLORIDE 1000 ML: 9 INJECTION, SOLUTION INTRAVENOUS at 18:06

## 2021-03-15 NOTE — ED PROVIDER NOTES
[address] [facility]  ED    CHIEF COMPLAINT  Neck Pain (began Friday on right side) and Headache (since Friday has had a \"drunk feeling\", numbness and tingling of his head, and pressure in his right eye that began last night)    HISTORY OF PRESENT ILLNESS  Beny Sanchez is a 45 y.o. male who presents to the ED complaining of neck pain, HA, and not feeling right. Friday night started having sharp pains up the back of his neck, he got real dizzy felt like he would pass out. Last couple days hasn't had pain but he feels out of it, fuzzy, like he is on drugs, feels out of it. His whole head is tingling and fuzzy feeling. He is having pain to touch on the top of the head. Last night at 11 pm his right eye got painful and heavy and then today his right cheek and face feels heavy. Not really having anymore pain just feels fuzzy. He denies any issues with his right arm or leg. Has pain in his right abdomen that he feels is related to kidney issues he has been seen here for in the past. He has a stone in the right kidney and enlarged prostate. He is on flomax, appointment with Urology next week. Reports his vision gets kind of blurry sometimes. No right eye vision issues. Wears reading glasses only. He does have chronic neck pain and states the pain he had Friday did not feel like his typical pain. Has been sweating over the past few days. No recent illnesses, fevers, chills. Denies sensitivity to light or sound. The patient is currently rating their pain as 3/10 and describes it as an aching type of pain. Treatments tried prior to arrival in the ED: tylenol and baby ASA. The patient denies other complaints, modifying factors or associated symptoms. The patient arrived to the ED via private car. Nursing notes reviewed.    Past Medical History:   Diagnosis Date    Anxiety     Chronic back pain     Chronic neck pain     Degenerative disc disease     Degenerative disc disease     Degenerative disc disease     Diverticulitis     Hypertension     Manic depression ([facility] [address]     Obesity     Scoliosis      Past Surgical History:   Procedure Laterality Date    KIDNEY STONE SURGERY       History reviewed. No pertinent family history. Social History     Socioeconomic History    Marital status:      Spouse name: Not on file    Number of children: Not on file    Years of education: Not on file    Highest education level: Not on file   Occupational History    Not on file   Social Needs    Financial resource strain: Not on file    Food insecurity     Worry: Not on file     Inability: Not on file    Transportation needs     Medical: Not on file     Non-medical: Not on file   Tobacco Use    Smoking status: Current Every Day Smoker     Packs/day: 0.50     Years: 25.00     Pack years: 12.50     Types: Cigarettes    Smokeless tobacco: Never Used   Substance and Sexual Activity    Alcohol use: Not Currently     Comment: OCC     Drug use: No    Sexual activity: Yes     Partners: Female   Lifestyle    Physical activity     Days per week: Not on file     Minutes per session: Not on file    Stress: Not on file   Relationships    Social connections     Talks on phone: Not on file     Gets together: Not on file     Attends Christian service: Not on file     Active member of club or organization: Not on file     Attends meetings of clubs or organizations: Not on file     Relationship status: Not on file    Intimate partner violence     Fear of current or ex partner: Not on file     Emotionally abused: Not on file     Physically abused: Not on file     Forced sexual activity: Not on file   Other Topics Concern    Not on file   Social History Narrative    Not on file     No current facility-administered medications for this encounter.       Current Outpatient Medications   Medication Sig Dispense Refill    tamsulosin (FLOMAX) 0.4 MG capsule Take 1 capsule by mouth daily 30 capsule 0    lisinopril-hydroCHLOROthiazide (PRINZIDE;ZESTORETIC) 20-25 MG per tablet Take 1 tablet by mouth daily 30 tablet 0    ibuprofen (ADVIL;MOTRIN) 600 MG tablet Take 1 tablet by mouth 3 times daily (with meals) 40 tablet 0    aspirin 81 MG chewable tablet Take 1 tablet by mouth daily. 30 tablet 3     Allergies   Allergen Reactions    Ciprofloxacin Itching       REVIEW OF SYSTEMS  10 systems reviewed, pertinent positives per HPI otherwise noted to be negative    PHYSICAL EXAM  /74   Pulse 80   Temp 98.1 °F (36.7 °C) (Oral)   Resp 20   Ht 6' (1.829 m)   Wt 295 lb (133.8 kg)   SpO2 97%   BMI 40.01 kg/m²   GENERAL APPEARANCE: Well developed, well nourished. Awake and alert. Cooperative. Observed resting in bed. No acute distress. HEAD: Normocephalic. Atraumatic. No facial asymmetry upon exam. Sensation is intact to light touch to the face. Smile is symmetrical, tongue is midline. Uvula is midline and elevates appropriately. Posterior oropharynx is without erythema, edema, or exudate. Tenderness to palpation of the right parietal scalp, no rash, wounds, erythema observed. EYES: Sclera is non-icteric. Conjunctiva normal. EOMI, PERRL. No nystagmus. ENT: External ears are normal. Mucous membranes are moist.   NECK: Supple. Normal ROM. Trachea mid-line. Cardiac: Tachycardic rate and rhythm. Capillary refill is brisk in bilateral upper extremities. S1/S2 is normal. There are no murmurs, rubs, or gallops to auscultation. LUNGS: Breathing is unlabored. Speaking comfortably in full sentences. Equal and symmetric chest rise. Breath sounds are clear throughout. There is no wheezing, rales, or rhonchi to auscultation. Abdomen: Non-distended. Non-tender to palpation. Bowel sounds are positive in all 4 quadrants. There is no hepatosplenomegaly to palpation. Musculoskeletal: Normal ROM. No gross deformities or trauma noted. Moving all extremities equally and appropriately. NEUROLOGICAL: Alert and oriented x3. Strength is normal. No focal motor or sensory deficits. Gait observed and normal. Strength is 5/5, equal and symmetric. Romberg is negative. No pronator drift. Finger to nose is intact bilaterally. Heel to shin is intact bilaterally. SKIN: Warm and dry. Skin is with good color. Skin is intact. Psychiatric: Mood and affect appropriate. Speech is clear and articulate. PROCEDURES  None    RADIOLOGY  Ct Head Wo Contrast    Result Date: 3/15/2021  EXAMINATION: CTA OF THE HEAD AND NECK WITH CONTRAST; CT OF THE HEAD WITHOUT CONTRAST 3/15/2021 6:25 pm: TECHNIQUE: CTA of the head and neck was performed with the administration of intravenous contrast. Multiplanar reformatted images are provided for review. MIP images are provided for review. Stenosis of the internal carotid arteries measured using NASCET criteria. Dose modulation, iterative reconstruction, and/or weight based adjustment of the mA/kV was utilized to reduce the radiation dose to as low as reasonably achievable.; CT of the head was performed without the administration of intravenous contrast. Dose modulation, iterative reconstruction, and/or weight based adjustment of the mA/kV was utilized to reduce the radiation dose to as low as reasonably achievable. COMPARISON: None. HISTORY: ORDERING SYSTEM PROVIDED HISTORY: Neck pain, head feeling fuzzy, right eye feeling heavy TECHNOLOGIST PROVIDED HISTORY: Reason for exam:->Neck pain, head feeling fuzzy, right eye feeling heavy Decision Support Exception->Emergency Medical Condition (MA) Reason for Exam: HA, feeling fuzzy, head tingling, right eye pain and feeling heavy Acuity: Acute Type of Exam: Initial FINDINGS: CTA NECK: AORTIC ARCH/ARCH VESSELS: No dissection or arterial injury. No significant stenosis of the brachiocephalic or subclavian arteries. CAROTID ARTERIES: No dissection, arterial injury, or hemodynamically significant stenosis by NASCET criteria.  VERTEBRAL ARTERIES: No dissection, arterial injury, CIRCULATION: No significant stenosis of the vertebral, basilar, or posterior cerebral arteries. No aneurysm. OTHER: No dural venous sinus thrombosis on this non-dedicated study. BRAIN: No mass effect or midline shift. No extra-axial fluid collection. The gray-white differentiation is maintained. No acute intracranial abnormality visualized. No flow limiting stenosis or large vessel occlusion detected within the head or neck. Incidentally noted pulmonary emphysema.        LABS  Results for orders placed or performed during the hospital encounter of 03/15/21   CBC Auto Differential   Result Value Ref Range    WBC 9.7 4.0 - 11.0 K/uL    RBC 4.68 4.20 - 5.90 M/uL    Hemoglobin 15.9 13.5 - 17.5 g/dL    Hematocrit 45.7 40.5 - 52.5 %    MCV 97.6 80.0 - 100.0 fL    MCH 33.9 26.0 - 34.0 pg    MCHC 34.7 31.0 - 36.0 g/dL    RDW 12.9 12.4 - 15.4 %    Platelets 310 648 - 033 K/uL    MPV 6.7 5.0 - 10.5 fL    Neutrophils % 67.9 %    Lymphocytes % 23.8 %    Monocytes % 5.6 %    Eosinophils % 1.9 %    Basophils % 0.8 %    Neutrophils Absolute 6.6 1.7 - 7.7 K/uL    Lymphocytes Absolute 2.3 1.0 - 5.1 K/uL    Monocytes Absolute 0.5 0.0 - 1.3 K/uL    Eosinophils Absolute 0.2 0.0 - 0.6 K/uL    Basophils Absolute 0.1 0.0 - 0.2 K/uL   Comprehensive Metabolic Panel w/ Reflex to MG   Result Value Ref Range    Sodium 135 (L) 136 - 145 mmol/L    Potassium reflex Magnesium 4.2 3.5 - 5.1 mmol/L    Chloride 99 99 - 110 mmol/L    CO2 28 21 - 32 mmol/L    Anion Gap 8 3 - 16    Glucose 105 (H) 70 - 99 mg/dL    BUN 20 7 - 20 mg/dL    CREATININE 0.8 (L) 0.9 - 1.3 mg/dL    GFR Non-African American >60 >60    GFR African American >60 >60    Calcium 9.8 8.3 - 10.6 mg/dL    Total Protein 7.7 6.4 - 8.2 g/dL    Albumin 4.9 3.4 - 5.0 g/dL    Albumin/Globulin Ratio 1.8 1.1 - 2.2    Total Bilirubin 0.4 0.0 - 1.0 mg/dL    Alkaline Phosphatase 97 40 - 129 U/L    ALT 31 10 - 40 U/L    AST 19 15 - 37 U/L    Globulin 2.8 g/dL   Urinalysis, reflex to microscopic Result Value Ref Range    Color, UA Yellow Straw/Yellow    Clarity, UA Clear Clear    Glucose, Ur Negative Negative mg/dL    Bilirubin Urine Negative Negative    Ketones, Urine Negative Negative mg/dL    Specific Gravity, UA >=1.030 1.005 - 1.030    Blood, Urine SMALL (A) Negative    pH, UA 5.5 5.0 - 8.0    Protein, UA Negative Negative mg/dL    Urobilinogen, Urine 0.2 <2.0 E.U./dL    Nitrite, Urine Negative Negative    Leukocyte Esterase, Urine Negative Negative    Microscopic Examination YES     Urine Type NotGiven    Microscopic Urinalysis   Result Value Ref Range    WBC, UA None seen 0 - 5 /HPF    RBC, UA 3-4 0 - 4 /HPF    Epithelial Cells, UA 0-1 0 - 5 /HPF       ED COURSE/MDM  Patient seen and evaluated. Old records reviewed. Diagnostic testing reviewed and results discussed. I have evaluated this patient. My supervising physician was available for consultation. Larry Hart presented to the ED today with above noted complaints. Physical exam does reveal cranial nerves II through XII to be grossly intact, no focal or lateralizing deficits on exam.  He does have some mild tenderness to palpation of the right parietal scalp. Blood work is without evidence of systemic infection. No anemia. No significant electrolyte abnormality. No evidence of acute kidney injury or transaminitis. Urinalysis is without evidence of infection. No blood. CT head without contrast shows no acute findings. CTA of the head and neck shows no acute findings and no flow-limiting stenosis or large vessel occlusion detected in the head or neck. Incidentally noted pulmonary emphysema.     While in the ED the patient received   Medications   0.9 % sodium chloride bolus (0 mLs Intravenous Stopped 3/15/21 2026)   ketorolac (TORADOL) injection 30 mg (30 mg Intravenous Given 3/15/21 1806)   metoclopramide (REGLAN) injection 10 mg (10 mg Intravenous Given 3/15/21 1806)   diphenhydrAMINE (BENADRYL) injection 25 mg (25 mg Intravenous Given 3/15/21 1806)   iopamidol (ISOVUE-370) 76 % injection 75 mL (75 mLs Intravenous Given 3/15/21 1838)     As the physical exam and diagnostic studies are unremarkable I think it is reasonable to discharge the patient at this point. Please refer to AVS for further details regarding discharge instructions. I advised patient to follow-up with his primary care provider for further evaluation of the complaints in the ER today. A discussion was had with the patient regarding diagnosis, diagnostic testing results, treatment/ plan of care, and follow up. All questions were answered. Patient will follow up as directed for further evaluation/treatment. The patient was given strict return precautions as we discussed symptoms that would necessitate return to the ED. Patient will return to ED for new/worsening symptoms. The patient verbalized their understanding and agreement with the above plan. I estimate there is LOW risk for ACUTE CORONARY SYNDROME, INTRACRANIAL HEMORRHAGE, MALIGNANT DYSRHYTHMIA, MENINGITIS, PNEUMONIA, PULMONARY EMBOLISM, SEPSIS, SUBARACHNOID HEMORRHAGE, SUBDURAL HEMATOMA, STROKE, or URINARY TRACT INFECTION, thus I consider the discharge disposition reasonable. Christi Powell and I have discussed the diagnosis and risks, and we agree with discharging home to follow-up with their primary doctor. We also discussed returning to the Emergency Department immediately if new or worsening symptoms occur. We have discussed the symptoms which are most concerning (e.g., changing or worsening pain, weakness, vomiting, fever) that necessitate immediate return. Clinical Impression  Final diagnoses:   Neck pain   Nonintractable headache, unspecified chronicity pattern, unspecified headache type   Does not feel right   Paresthesia   Pulmonary emphysema, unspecified emphysema type ([address]       Patient was sent home with a prescription for below medication/s.   I did [place] patient on appropriate use of these medication. Discharge Medication List as of 3/15/2021  8:52 PM          FOLLOW UP  Jakub Vaca MD  [facility]  [address]  [phone]    Call in 1 day  For further evaluation    Ruba Martinez MD  [address]  [address] [place]  [phone]    Call in 1 day  For further evaluation    [facility]  ED  [address] [facility] [address] [phone]  Go to   If symptoms worsen      DISPOSITION  Patient was discharged to home in good condition. Comment: Please note this report has been produced using speech recognition software and may contain errors related to that system including errors in grammar, punctuation, and spelling, as well as words and phrases that may be inappropriate. If there are any questions or concerns please feel free to contact the dictating provider for clarification.         Fariba Burrows, [facility] [place]  03/16/21 0041

## 2021-03-15 NOTE — ED NOTES
Patient identified as a positive fall risk on the ED triage fall screening. Patient placed in fall precautions which includes:  yellow fall risk bracelet on wrist, yellow socks not on feet, tread shoes left on  \"Be Safe\" sign placed on patient's door, and bed alarm placed under patient/alarm turned on. Patient instructed on importance of not getting out of bed or ambulating without assistance for safety.           Unknown MIKKI Harper  03/15/21 5069

## 2021-03-16 NOTE — ED NOTES
Pt ok to d/c to home. Pt given d/c instructions. Pt verbalized understating including Rx and follow up care. [facility].  0 s/s of distress at time of d/c.          Gracie Saint, RN  03/15/21 8620

## 2021-04-06 ENCOUNTER — HOSPITAL ENCOUNTER (EMERGENCY)
Age: 51
Discharge: HOME | End: 2021-04-06
Payer: COMMERCIAL

## 2021-04-06 ENCOUNTER — APPOINTMENT (OUTPATIENT)
Dept: CT IMAGING | Age: 51
End: 2021-04-06
Payer: COMMERCIAL

## 2021-04-06 VITALS
WEIGHT: 295 LBS | HEIGHT: 72 IN | RESPIRATION RATE: 16 BRPM | OXYGEN SATURATION: 99 % | BODY MASS INDEX: 39.96 KG/M2 | TEMPERATURE: 98 F | SYSTOLIC BLOOD PRESSURE: 116 MMHG | DIASTOLIC BLOOD PRESSURE: 77 MMHG | HEART RATE: 77 BPM

## 2021-04-06 DIAGNOSIS — K08.89 PAIN, DENTAL: ICD-10-CM

## 2021-04-06 DIAGNOSIS — R20.2 PARESTHESIA: ICD-10-CM

## 2021-04-06 DIAGNOSIS — R51.9 ACUTE NONINTRACTABLE HEADACHE, UNSPECIFIED HEADACHE TYPE: Primary | ICD-10-CM

## 2021-04-06 DIAGNOSIS — R11.0 NAUSEA: ICD-10-CM

## 2021-04-06 LAB
A/G RATIO: 1.9 (ref 1.1–2.2)
ALBUMIN SERPL-MCNC: 4.6 G/DL (ref 3.4–5)
ALP BLD-CCNC: 87 U/L (ref 40–129)
ALT SERPL-CCNC: 38 U/L (ref 10–40)
ANION GAP SERPL CALCULATED.3IONS-SCNC: 13 MMOL/L (ref 3–16)
AST SERPL-CCNC: 62 U/L (ref 15–37)
BASOPHILS ABSOLUTE: 0.1 K/UL (ref 0–0.2)
BASOPHILS RELATIVE PERCENT: 1.1 %
BILIRUB SERPL-MCNC: 0.3 MG/DL (ref 0–1)
BILIRUBIN URINE: NEGATIVE
BLOOD, URINE: ABNORMAL
BUN BLDV-MCNC: 13 MG/DL (ref 7–20)
CALCIUM SERPL-MCNC: 9.4 MG/DL (ref 8.3–10.6)
CHLORIDE BLD-SCNC: 101 MMOL/L (ref 99–110)
CLARITY: CLEAR
CO2: 21 MMOL/L (ref 21–32)
COLOR: YELLOW
CREAT SERPL-MCNC: 0.7 MG/DL (ref 0.9–1.3)
EOSINOPHILS ABSOLUTE: 0.1 K/UL (ref 0–0.6)
EOSINOPHILS RELATIVE PERCENT: 1.9 %
EPITHELIAL CELLS, UA: NORMAL /HPF (ref 0–5)
GFR AFRICAN AMERICAN: >60
GFR NON-AFRICAN AMERICAN: >60
GLOBULIN: 2.4 G/DL
GLUCOSE BLD-MCNC: 103 MG/DL (ref 70–99)
GLUCOSE URINE: NEGATIVE MG/DL
HCT VFR BLD CALC: 42.8 % (ref 40.5–52.5)
HEMOGLOBIN: 14.6 G/DL (ref 13.5–17.5)
KETONES, URINE: NEGATIVE MG/DL
LEUKOCYTE ESTERASE, URINE: NEGATIVE
LIPASE: 36 U/L (ref 13–60)
LYMPHOCYTES ABSOLUTE: 1.8 K/UL (ref 1–5.1)
LYMPHOCYTES RELATIVE PERCENT: 23.6 %
MAGNESIUM: 2.2 MG/DL (ref 1.8–2.4)
MCH RBC QN AUTO: 33.8 PG (ref 26–34)
MCHC RBC AUTO-ENTMCNC: 34.2 G/DL (ref 31–36)
MCV RBC AUTO: 99 FL (ref 80–100)
MICROSCOPIC EXAMINATION: YES
MONOCYTES ABSOLUTE: 0.5 K/UL (ref 0–1.3)
MONOCYTES RELATIVE PERCENT: 6.9 %
NEUTROPHILS ABSOLUTE: 5 K/UL (ref 1.7–7.7)
NEUTROPHILS RELATIVE PERCENT: 66.5 %
NITRITE, URINE: NEGATIVE
PDW BLD-RTO: 13.3 % (ref 12.4–15.4)
PH UA: 6 (ref 5–8)
PLATELET # BLD: 300 K/UL (ref 135–450)
PMV BLD AUTO: 7.1 FL (ref 5–10.5)
POTASSIUM SERPL-SCNC: 5.2 MMOL/L (ref 3.5–5.1)
PROTEIN UA: NEGATIVE MG/DL
RBC # BLD: 4.32 M/UL (ref 4.2–5.9)
RBC UA: NORMAL /HPF (ref 0–4)
SODIUM BLD-SCNC: 135 MMOL/L (ref 136–145)
SPECIFIC GRAVITY UA: 1.02 (ref 1–1.03)
TOTAL PROTEIN: 7 G/DL (ref 6.4–8.2)
URINE REFLEX TO CULTURE: ABNORMAL
URINE TYPE: ABNORMAL
UROBILINOGEN, URINE: 0.2 E.U./DL
WBC # BLD: 7.5 K/UL (ref 4–11)
WBC UA: NORMAL /HPF (ref 0–5)

## 2021-04-06 PROCEDURE — 2580000003 HC RX 258: Performed by: NURSE PRACTITIONER

## 2021-04-06 PROCEDURE — 85025 COMPLETE CBC W/AUTO DIFF WBC: CPT

## 2021-04-06 PROCEDURE — 96374 THER/PROPH/DIAG INJ IV PUSH: CPT

## 2021-04-06 PROCEDURE — 99284 EMERGENCY DEPT VISIT MOD MDM: CPT

## 2021-04-06 PROCEDURE — 70450 CT HEAD/BRAIN W/O DYE: CPT

## 2021-04-06 PROCEDURE — 83690 ASSAY OF LIPASE: CPT

## 2021-04-06 PROCEDURE — 96375 TX/PRO/DX INJ NEW DRUG ADDON: CPT

## 2021-04-06 PROCEDURE — 80053 COMPREHEN METABOLIC PANEL: CPT

## 2021-04-06 PROCEDURE — 81001 URINALYSIS AUTO W/SCOPE: CPT

## 2021-04-06 PROCEDURE — 6360000002 HC RX W HCPCS: Performed by: NURSE PRACTITIONER

## 2021-04-06 PROCEDURE — 83735 ASSAY OF MAGNESIUM: CPT

## 2021-04-06 RX ORDER — DIPHENHYDRAMINE HYDROCHLORIDE 50 MG/ML
12.5 INJECTION INTRAMUSCULAR; INTRAVENOUS ONCE
Status: COMPLETED | OUTPATIENT
Start: 2021-04-06 | End: 2021-04-06

## 2021-04-06 RX ORDER — KETOROLAC TROMETHAMINE 30 MG/ML
30 INJECTION, SOLUTION INTRAMUSCULAR; INTRAVENOUS ONCE
Status: COMPLETED | OUTPATIENT
Start: 2021-04-06 | End: 2021-04-06

## 2021-04-06 RX ORDER — METOCLOPRAMIDE HYDROCHLORIDE 5 MG/ML
10 INJECTION INTRAMUSCULAR; INTRAVENOUS ONCE
Status: COMPLETED | OUTPATIENT
Start: 2021-04-06 | End: 2021-04-06

## 2021-04-06 RX ORDER — 0.9 % SODIUM CHLORIDE 0.9 %
1000 INTRAVENOUS SOLUTION INTRAVENOUS ONCE
Status: COMPLETED | OUTPATIENT
Start: 2021-04-06 | End: 2021-04-06

## 2021-04-06 RX ADMIN — KETOROLAC TROMETHAMINE 30 MG: 30 INJECTION, SOLUTION INTRAMUSCULAR at 16:20

## 2021-04-06 RX ADMIN — METOCLOPRAMIDE HYDROCHLORIDE 10 MG: 5 INJECTION INTRAMUSCULAR; INTRAVENOUS at 16:20

## 2021-04-06 RX ADMIN — DIPHENHYDRAMINE HYDROCHLORIDE 12.5 MG: 50 INJECTION, SOLUTION INTRAMUSCULAR; INTRAVENOUS at 16:20

## 2021-04-06 RX ADMIN — SODIUM CHLORIDE 1000 ML: 9 INJECTION, SOLUTION INTRAVENOUS at 16:21

## 2021-04-06 ASSESSMENT — PAIN DESCRIPTION - PROGRESSION: CLINICAL_PROGRESSION: NOT CHANGED

## 2021-04-06 ASSESSMENT — PAIN DESCRIPTION - ORIENTATION: ORIENTATION: RIGHT

## 2021-04-06 ASSESSMENT — PAIN DESCRIPTION - FREQUENCY: FREQUENCY: CONTINUOUS

## 2021-04-06 ASSESSMENT — PAIN SCALES - GENERAL: PAINLEVEL_OUTOF10: 5

## 2021-04-06 ASSESSMENT — PAIN DESCRIPTION - ONSET: ONSET: GRADUAL

## 2021-04-06 NOTE — ED PROVIDER NOTES
[facility] Emergency Department    CHIEF COMPLAINT  Numbness (Pt reports 3 weeks ago had r sided facial numbness/ headache, had a stroke work up, found nothing assumed shingles, pt was unable to follow-up with PCP. Pt reports 2 rotted teeth on R side for years and reports pain and \"tasting infection\" pt has not seen a dentist for it. Pt currently taking antibiotic for teeth.), Headache, Dental Pain, Nausea (Pt reports abd pain and nausea \"for the past couple of days\"), and Abdominal Pain      HISTORY OF PRESENT ILLNESS  Randall Whelan is a 45 y.o. male who presents to the ED complaining of headache that starts at the base of his posterior scalp and radiates up to his right temporal region with associated tingling to his face. Patient reports this is been going on for over a month. Patient has been evaluated for this in the emergency department stroke was ruled out at that time. Patient also reports right upper dental pain concern for infection he has been on a course of antibiotics provided by urgent care he has not followed up with a dentist.  Patient reports since he has had his dental infection he has experienced associated nausea he believes is due to the infection. Patient denies measurable fever, chills, body aches, cough, sore throat, difficulty swallowing, chest pain, shortness of breath, numbness or tingling in the extremities, extremity weakness, slurred speech, emesis, diarrhea, urinary complaints, flank pain, leg swelling, calf pain, palpitations. No other complaints, modifying factors or associated symptoms. Nursing notes reviewed.    Past Medical History:   Diagnosis Date    Anxiety     Chronic back pain     Chronic neck pain     Degenerative disc disease     Degenerative disc disease     Degenerative disc disease     Diverticulitis     Hypertension     Manic depression ([address]     Obesity     Scoliosis      Past Surgical History:   Procedure Laterality Date    KIDNEY STONE SURGERY       History reviewed. No pertinent family history. Social History     Socioeconomic History    Marital status:      Spouse name: Not on file    Number of children: Not on file    Years of education: Not on file    Highest education level: Not on file   Occupational History    Not on file   Social Needs    Financial resource strain: Not on file    Food insecurity     Worry: Not on file     Inability: Not on file    Transportation needs     Medical: Not on file     Non-medical: Not on file   Tobacco Use    Smoking status: Current Every Day Smoker     Packs/day: 0.50     Years: 25.00     Pack years: 12.50     Types: Cigarettes    Smokeless tobacco: Never Used   Substance and Sexual Activity    Alcohol use: Not Currently     Comment: OCC     Drug use: No    Sexual activity: Yes     Partners: Female   Lifestyle    Physical activity     Days per week: Not on file     Minutes per session: Not on file    Stress: Not on file   Relationships    Social connections     Talks on phone: Not on file     Gets together: Not on file     Attends Congregational service: Not on file     Active member of club or organization: Not on file     Attends meetings of clubs or organizations: Not on file     Relationship status: Not on file    Intimate partner violence     Fear of current or ex partner: Not on file     Emotionally abused: Not on file     Physically abused: Not on file     Forced sexual activity: Not on file   Other Topics Concern    Not on file   Social History Narrative    Not on file     No current facility-administered medications for this encounter.       Current Outpatient Medications   Medication Sig Dispense Refill    lisinopril-hydroCHLOROthiazide (PRINZIDE;ZESTORETIC) 20-25 MG per tablet Take 1 tablet by mouth daily 30 tablet 0    tamsulosin (FLOMAX) 0.4 MG capsule Take 1 capsule by mouth daily 30 capsule 0    ibuprofen (ADVIL;MOTRIN) 600 MG tablet Take 1 tablet by mouth 3 times daily (with meals) 40 tablet 0    aspirin 81 MG chewable tablet Take 1 tablet by mouth daily. 30 tablet 3     Allergies   Allergen Reactions    Ciprofloxacin Itching       REVIEW OF SYSTEMS  10 systems reviewed, pertinent positives per HPI otherwise noted to be negative    PHYSICAL EXAM  /77   Pulse 77   Temp 98 °F (36.7 °C) (Oral)   Resp 16   Ht 6' (1.829 m)   Wt 295 lb (133.8 kg)   SpO2 99%   BMI 40.01 kg/m²   GENERAL APPEARANCE: Awake and alert. Cooperative. No acute distress. Vital signs are stable. Well appearing and non toxic. HEAD: Normocephalic. Atraumatic. EYES: PERRL. EOM's grossly intact. ENT: Mucous membranes are moist.   NECK: Supple. Normal ROM. HEART: RRR. Distal pulses are equal and intact. Cap refill less than 2 seconds. LUNGS: Respirations unlabored. CTAB. Good air exchange. Speaking comfortably in full sentences. No wheezing, rhonchi, rales, stridor. ABDOMEN: Soft. Non-distended. Non-tender. No guarding or rebound. No rigidity. Bowel sounds are present. Negative greene's. Negative McBurney's point. Negative CVA tenderness. EXTREMITIES: No peripheral edema. Moves all extremities equally. All extremities neurovascularly intact. SKIN: Warm and dry. No acute rashes. NEUROLOGICAL: Alert and oriented. No gross facial drooping. Strength 5/5, sensation intact. Speech is clear. No ataxia. No dysmetria or dysarthria. Patient able to perform finger-to-nose. Patient able to differentiate between sharp and dull. CN II through XII intact. PSYCHIATRIC: Normal mood and affect.       RADIOLOGY  Ct Head Wo Contrast    Result Date: 4/6/2021  EXAMINATION: CT OF THE HEAD WITHOUT CONTRAST  4/6/2021 4:09 pm TECHNIQUE: CT of the head was performed without the administration of intravenous contrast. Dose modulation, iterative reconstruction, and/or weight based adjustment of the mA/kV was utilized to reduce the radiation dose to as low as reasonably achievable. COMPARISON: Prior studies most recent 03/15/2021. HISTORY: ORDERING SYSTEM PROVIDED HISTORY: headache TECHNOLOGIST PROVIDED HISTORY: Reason for exam:->headache Has a \"code stroke\" or \"stroke alert\" been called? ->No Decision Support Exception->Emergency Medical Condition ([place]) Reason for Exam: persistent right posterior h/a x 1 months-seen in ER 3 wks ago with scans Acuity: Acute Type of Exam: Initial Relevant Medical/Surgical History: no prev hx FINDINGS: BRAIN/VENTRICLES: The ventricular system is normal in appearance. No evidence of mass effect or midline shift. No area of abnormal attenuation of brain parenchyma is identified. No abnormal extra-axial fluid collection is identified. ORBITS: The visualized portion of the orbits demonstrate no acute abnormality. SINUSES: The visualized paranasal sinuses and mastoid air cells demonstrate no acute abnormality. Minimal mucosal thickening identified within few ethmoid air cells and maxillary sinuses. Mucosal thickening with probable minimal inspissated mucus identified within the right sphenoid locule. SOFT TISSUES/SKULL:  No acute abnormality of the visualized skull or soft tissues. No evidence of acute intracranial abnormality. Ct Head Wo Contrast    Result Date: 3/15/2021  EXAMINATION: CTA OF THE HEAD AND NECK WITH CONTRAST; CT OF THE HEAD WITHOUT CONTRAST 3/15/2021 6:25 pm: TECHNIQUE: CTA of the head and neck was performed with the administration of intravenous contrast. Multiplanar reformatted images are provided for review. MIP images are provided for review. Stenosis of the internal carotid arteries measured using NASCET criteria.  Dose modulation, iterative reconstruction, and/or weight based adjustment of the mA/kV was utilized to reduce the radiation dose to as low as reasonably achievable.; CT of the head was performed without the administration of intravenous contrast. Dose modulation, iterative reconstruction, and/or weight based adjustment of the mA/kV was utilized to reduce the radiation dose to as low as reasonably achievable. COMPARISON: None. HISTORY: ORDERING SYSTEM PROVIDED HISTORY: Neck pain, head feeling fuzzy, right eye feeling heavy TECHNOLOGIST PROVIDED HISTORY: Reason for exam:->Neck pain, head feeling fuzzy, right eye feeling heavy Decision Support Exception->Emergency Medical Condition (MA) Reason for Exam: HA, feeling fuzzy, head tingling, right eye pain and feeling heavy Acuity: Acute Type of Exam: Initial FINDINGS: CTA NECK: AORTIC ARCH/ARCH VESSELS: No dissection or arterial injury. No significant stenosis of the brachiocephalic or subclavian arteries. CAROTID ARTERIES: No dissection, arterial injury, or hemodynamically significant stenosis by NASCET criteria. VERTEBRAL ARTERIES: No dissection, arterial injury, or significant stenosis. SOFT TISSUES: There are mild emphysematous changes of the lungs. BONES: No acute osseous abnormality. CTA HEAD: ANTERIOR CIRCULATION: No significant stenosis of the intracranial internal carotid, anterior cerebral, or middle cerebral arteries. No aneurysm. POSTERIOR CIRCULATION: No significant stenosis of the vertebral, basilar, or posterior cerebral arteries. No aneurysm. OTHER: No dural venous sinus thrombosis on this non-dedicated study. BRAIN: No mass effect or midline shift. No extra-axial fluid collection. The gray-white differentiation is maintained. No acute intracranial abnormality visualized. No flow limiting stenosis or large vessel occlusion detected within the head or neck. Incidentally noted pulmonary emphysema. Ct Cervical Spine Wo Contrast    Result Date: 3/15/2021  EXAMINATION: CT OF THE CERVICAL SPINE WITHOUT CONTRAST 3/15/2021 6:22 pm TECHNIQUE: CT of the cervical spine was performed without the administration of intravenous contrast. Multiplanar reformatted images are provided for review.  Dose modulation, iterative reconstruction, and/or weight based adjustment of the mA/kV was utilized to reduce the radiation dose to as low as reasonably achievable. COMPARISON: None. HISTORY: ORDERING SYSTEM PROVIDED HISTORY: neck pain TECHNOLOGIST PROVIDED HISTORY: Reason for exam:->neck pain Decision Support Exception->Emergency Medical Condition (MA) Reason for Exam: HA, feeling fuzzy, head tingling, right eye pain and feeling heavy Acuity: Acute Type of Exam: Initial FINDINGS: BONES/ALIGNMENT: Straightening of the cervical lordosis. Alignment is within normal limits. Vertebral body heights appear maintained. Intervertebral disc heights appear generally maintained. Posterior elements appear intact. DEGENERATIVE CHANGES: Scattered degenerative changes noted in the visualized spine without spondylolisthesis. Beam hardening artifact limits evaluation for disc protrusion/bulge. SOFT TISSUES: There is no prevertebral soft tissue swelling. No acute fracture. No listhesis. Cta Head Neck W Contrast    Result Date: 3/15/2021  EXAMINATION: CTA OF THE HEAD AND NECK WITH CONTRAST; CT OF THE HEAD WITHOUT CONTRAST 3/15/2021 6:25 pm: TECHNIQUE: CTA of the head and neck was performed with the administration of intravenous contrast. Multiplanar reformatted images are provided for review. MIP images are provided for review. Stenosis of the internal carotid arteries measured using NASCET criteria. Dose modulation, iterative reconstruction, and/or weight based adjustment of the mA/kV was utilized to reduce the radiation dose to as low as reasonably achievable.; CT of the head was performed without the administration of intravenous contrast. Dose modulation, iterative reconstruction, and/or weight based adjustment of the mA/kV was utilized to reduce the radiation dose to as low as reasonably achievable. COMPARISON: None.  HISTORY: ORDERING SYSTEM PROVIDED HISTORY: Neck pain, head feeling fuzzy, right eye feeling heavy TECHNOLOGIST PROVIDED HISTORY: Reason for exam:->Neck pain, head feeling fuzzy, right eye feeling heavy Decision Support Exception->Emergency Medical Condition (MA) Reason for Exam: HA, feeling fuzzy, head tingling, right eye pain and feeling heavy Acuity: Acute Type of Exam: Initial FINDINGS: CTA NECK: AORTIC ARCH/ARCH VESSELS: No dissection or arterial injury. No significant stenosis of the brachiocephalic or subclavian arteries. CAROTID ARTERIES: No dissection, arterial injury, or hemodynamically significant stenosis by NASCET criteria. VERTEBRAL ARTERIES: No dissection, arterial injury, or significant stenosis. SOFT TISSUES: There are mild emphysematous changes of the lungs. BONES: No acute osseous abnormality. CTA HEAD: ANTERIOR CIRCULATION: No significant stenosis of the intracranial internal carotid, anterior cerebral, or middle cerebral arteries. No aneurysm. POSTERIOR CIRCULATION: No significant stenosis of the vertebral, basilar, or posterior cerebral arteries. No aneurysm. OTHER: No dural venous sinus thrombosis on this non-dedicated study. BRAIN: No mass effect or midline shift. No extra-axial fluid collection. The gray-white differentiation is maintained. No acute intracranial abnormality visualized. No flow limiting stenosis or large vessel occlusion detected within the head or neck. Incidentally noted pulmonary emphysema. ED COURSE/MDM  Patient seen and evaluated. Old records reviewed. Diagnostic testing reviewed and results discussed. I have independently evaluated this patient based upon my scope of practice. Supervising physician was in the department for consultation as needed. Poli Glasgow presented to the ED today with above noted complaints. Prior emergency department visit reviewed CT of the head repeated today and shows no acute intracranial abnormality. Headache improved after Reglan, Benadryl, Toradol, sodium chloride bolus. Remainder of emergency department course is unremarkable no anemia, leukocytosis, electrolyte abnormality, UTI. I discussed with patient I would provide him with a referral for dental clinics for further management of his dental pain and neurology for further work-up of his headaches. Patient is agreeable with this plan of care. While in ED patient received   Medications   0.9 % sodium chloride bolus (0 mLs Intravenous Stopped 4/6/21 1809)   metoclopramide (REGLAN) injection 10 mg (10 mg Intravenous Given 4/6/21 1620)   diphenhydrAMINE (BENADRYL) injection 12.5 mg (12.5 mg Intravenous Given 4/6/21 1620)   ketorolac (TORADOL) injection 30 mg (30 mg Intravenous Given 4/6/21 1620)             At this point I do not feel the patient requires further work up and it is reasonable to discharge the patient. A discussion was had with the patient and/or their surrogate regarding diagnosis, diagnostic testing results, treatment/ plan of care, and follow up. There was shared decision-making between myself as well as the patient and/or their surrogate and we are all in agreement with discharge home. There was an opportunity for questions and all questions were answered to the best of my ability and to the satisfaction of the patient and/or patient family. Patient will follow up with neurology for further evaluation/treatment. The patient was given strict return precautions as we discussed symptoms that would necessitate return to the ED. Patient will return to ED for new/worsening symptoms. The patient verbalized their understanding and agreement with the above plan. Please refer to AVS for further details regarding discharge instructions.       Results for orders placed or performed during the hospital encounter of 04/06/21   CBC Auto Differential   Result Value Ref Range    WBC 7.5 4.0 - 11.0 K/uL    RBC 4.32 4.20 - 5.90 M/uL    Hemoglobin 14.6 13.5 - 17.5 g/dL    Hematocrit 42.8 40.5 - 52.5 %    MCV 99.0 80.0 - 100.0 fL    MCH 33.8 26.0 - 34.0 pg    MCHC 34.2 31.0 - 36.0 g/dL    RDW 13.3 12.4 - 15.4 %    Platelets 118 135 - 450 K/uL    MPV 7.1 5.0 - 10.5 fL    Neutrophils % 66.5 %    Lymphocytes % 23.6 %    Monocytes % 6.9 %    Eosinophils % 1.9 %    Basophils % 1.1 %    Neutrophils Absolute 5.0 1.7 - 7.7 K/uL    Lymphocytes Absolute 1.8 1.0 - 5.1 K/uL    Monocytes Absolute 0.5 0.0 - 1.3 K/uL    Eosinophils Absolute 0.1 0.0 - 0.6 K/uL    Basophils Absolute 0.1 0.0 - 0.2 K/uL   Comprehensive Metabolic Panel   Result Value Ref Range    Sodium 135 (L) 136 - 145 mmol/L    Potassium 5.2 (H) 3.5 - 5.1 mmol/L    Chloride 101 99 - 110 mmol/L    CO2 21 21 - 32 mmol/L    Anion Gap 13 3 - 16    Glucose 103 (H) 70 - 99 mg/dL    BUN 13 7 - 20 mg/dL    CREATININE 0.7 (L) 0.9 - 1.3 mg/dL    GFR Non-African American >60 >60    GFR African American >60 >60    Calcium 9.4 8.3 - 10.6 mg/dL    Total Protein 7.0 6.4 - 8.2 g/dL    Albumin 4.6 3.4 - 5.0 g/dL    Albumin/Globulin Ratio 1.9 1.1 - 2.2    Total Bilirubin 0.3 0.0 - 1.0 mg/dL    Alkaline Phosphatase 87 40 - 129 U/L    ALT 38 10 - 40 U/L    AST 62 (H) 15 - 37 U/L    Globulin 2.4 g/dL   Lipase   Result Value Ref Range    Lipase 36.0 13.0 - 60.0 U/L   Urinalysis Reflex to Culture    Specimen: Urine voided   Result Value Ref Range    Color, UA Yellow Straw/Yellow    Clarity, UA Clear Clear    Glucose, Ur Negative Negative mg/dL    Bilirubin Urine Negative Negative    Ketones, Urine Negative Negative mg/dL    Specific Gravity, UA 1.020 1.005 - 1.030    Blood, Urine SMALL (A) Negative    pH, UA 6.0 5.0 - 8.0    Protein, UA Negative Negative mg/dL    Urobilinogen, Urine 0.2 <2.0 E.U./dL    Nitrite, Urine Negative Negative    Leukocyte Esterase, Urine Negative Negative    Microscopic Examination YES     Urine Type NotGiven     Urine Reflex to Culture Not Indicated    Magnesium   Result Value Ref Range    Magnesium 2.20 1.80 - 2.40 mg/dL   Microscopic Urinalysis   Result Value Ref Range    WBC, UA None seen 0 - 5 /HPF    RBC, UA 3-4 0 - 4 /HPF    Epithelial Cells, UA 0-1 0 - 5 /HPF       I estimate

## 2021-04-10 ENCOUNTER — HOSPITAL ENCOUNTER (EMERGENCY)
Age: 51
Discharge: HOME | End: 2021-04-10
Attending: EMERGENCY MEDICINE
Payer: COMMERCIAL

## 2021-04-10 ENCOUNTER — APPOINTMENT (OUTPATIENT)
Dept: GENERAL RADIOLOGY | Age: 51
End: 2021-04-10
Payer: COMMERCIAL

## 2021-04-10 ENCOUNTER — APPOINTMENT (OUTPATIENT)
Dept: CT IMAGING | Age: 51
End: 2021-04-10
Payer: COMMERCIAL

## 2021-04-10 VITALS
WEIGHT: 295 LBS | TEMPERATURE: 98 F | DIASTOLIC BLOOD PRESSURE: 75 MMHG | RESPIRATION RATE: 14 BRPM | HEART RATE: 91 BPM | OXYGEN SATURATION: 96 % | SYSTOLIC BLOOD PRESSURE: 131 MMHG | HEIGHT: 72 IN | BODY MASS INDEX: 39.96 KG/M2

## 2021-04-10 DIAGNOSIS — M79.2 NEUROPATHIC PAIN: Primary | ICD-10-CM

## 2021-04-10 DIAGNOSIS — E87.1 HYPONATREMIA: ICD-10-CM

## 2021-04-10 DIAGNOSIS — R42 LIGHTHEADED: ICD-10-CM

## 2021-04-10 DIAGNOSIS — I95.1 ORTHOSTASIS: ICD-10-CM

## 2021-04-10 DIAGNOSIS — R73.9 HYPERGLYCEMIA: ICD-10-CM

## 2021-04-10 LAB
A/G RATIO: 2 (ref 1.1–2.2)
ALBUMIN SERPL-MCNC: 4.8 G/DL (ref 3.4–5)
ALP BLD-CCNC: 88 U/L (ref 40–129)
ALT SERPL-CCNC: 37 U/L (ref 10–40)
ANION GAP SERPL CALCULATED.3IONS-SCNC: 9 MMOL/L (ref 3–16)
AST SERPL-CCNC: 31 U/L (ref 15–37)
BACTERIA: ABNORMAL /HPF
BASOPHILS ABSOLUTE: 0 K/UL (ref 0–0.2)
BASOPHILS RELATIVE PERCENT: 0.4 %
BILIRUB SERPL-MCNC: 0.4 MG/DL (ref 0–1)
BILIRUBIN URINE: NEGATIVE
BLOOD, URINE: ABNORMAL
BUN BLDV-MCNC: 16 MG/DL (ref 7–20)
CALCIUM SERPL-MCNC: 9.6 MG/DL (ref 8.3–10.6)
CHLORIDE BLD-SCNC: 99 MMOL/L (ref 99–110)
CLARITY: CLEAR
CO2: 25 MMOL/L (ref 21–32)
COLOR: ABNORMAL
CREAT SERPL-MCNC: 0.8 MG/DL (ref 0.9–1.3)
EOSINOPHILS ABSOLUTE: 0.3 K/UL (ref 0–0.6)
EOSINOPHILS RELATIVE PERCENT: 2.5 %
GFR AFRICAN AMERICAN: >60
GFR NON-AFRICAN AMERICAN: >60
GLOBULIN: 2.4 G/DL
GLUCOSE BLD-MCNC: 146 MG/DL (ref 70–99)
GLUCOSE URINE: NEGATIVE MG/DL
HCT VFR BLD CALC: 43.4 % (ref 40.5–52.5)
HEMOGLOBIN: 14.8 G/DL (ref 13.5–17.5)
KETONES, URINE: NEGATIVE MG/DL
LEUKOCYTE ESTERASE, URINE: NEGATIVE
LYMPHOCYTES ABSOLUTE: 2.8 K/UL (ref 1–5.1)
LYMPHOCYTES RELATIVE PERCENT: 27.6 %
MCH RBC QN AUTO: 33.7 PG (ref 26–34)
MCHC RBC AUTO-ENTMCNC: 34.2 G/DL (ref 31–36)
MCV RBC AUTO: 98.8 FL (ref 80–100)
MICROSCOPIC EXAMINATION: YES
MONOCYTES ABSOLUTE: 0.5 K/UL (ref 0–1.3)
MONOCYTES RELATIVE PERCENT: 5.2 %
NEUTROPHILS ABSOLUTE: 6.6 K/UL (ref 1.7–7.7)
NEUTROPHILS RELATIVE PERCENT: 64.3 %
NITRITE, URINE: NEGATIVE
PDW BLD-RTO: 13.1 % (ref 12.4–15.4)
PH UA: 7.5 (ref 5–8)
PLATELET # BLD: 308 K/UL (ref 135–450)
PMV BLD AUTO: 6.9 FL (ref 5–10.5)
POTASSIUM SERPL-SCNC: 3.8 MMOL/L (ref 3.5–5.1)
PRO-BNP: 13 PG/ML (ref 0–124)
PROTEIN UA: NEGATIVE MG/DL
RBC # BLD: 4.4 M/UL (ref 4.2–5.9)
RBC UA: ABNORMAL /HPF (ref 0–4)
SODIUM BLD-SCNC: 133 MMOL/L (ref 136–145)
SPECIFIC GRAVITY UA: 1.01 (ref 1–1.03)
TOTAL PROTEIN: 7.2 G/DL (ref 6.4–8.2)
TROPONIN: <0.01 NG/ML
URINE REFLEX TO CULTURE: ABNORMAL
URINE TYPE: ABNORMAL
UROBILINOGEN, URINE: 0.2 E.U./DL
WBC # BLD: 10.3 K/UL (ref 4–11)
WBC UA: ABNORMAL /HPF (ref 0–5)

## 2021-04-10 PROCEDURE — 80053 COMPREHEN METABOLIC PANEL: CPT

## 2021-04-10 PROCEDURE — 71260 CT THORAX DX C+: CPT

## 2021-04-10 PROCEDURE — 83880 ASSAY OF NATRIURETIC PEPTIDE: CPT

## 2021-04-10 PROCEDURE — 85025 COMPLETE CBC W/AUTO DIFF WBC: CPT

## 2021-04-10 PROCEDURE — 6370000000 HC RX 637 (ALT 250 FOR IP): Performed by: EMERGENCY MEDICINE

## 2021-04-10 PROCEDURE — 93005 ELECTROCARDIOGRAM TRACING: CPT | Performed by: NURSE PRACTITIONER

## 2021-04-10 PROCEDURE — 81001 URINALYSIS AUTO W/SCOPE: CPT

## 2021-04-10 PROCEDURE — 99284 EMERGENCY DEPT VISIT MOD MDM: CPT

## 2021-04-10 PROCEDURE — 84484 ASSAY OF TROPONIN QUANT: CPT

## 2021-04-10 PROCEDURE — 6360000004 HC RX CONTRAST MEDICATION: Performed by: NURSE PRACTITIONER

## 2021-04-10 PROCEDURE — 71045 X-RAY EXAM CHEST 1 VIEW: CPT

## 2021-04-10 RX ORDER — GABAPENTIN 300 MG/1
300 CAPSULE ORAL 3 TIMES DAILY
Qty: 90 CAPSULE | Refills: 0 | Status: SHIPPED | OUTPATIENT
Start: 2021-04-10 | End: 2021-05-11 | Stop reason: SDUPTHER

## 2021-04-10 RX ORDER — GABAPENTIN 300 MG/1
300 CAPSULE ORAL ONCE
Status: COMPLETED | OUTPATIENT
Start: 2021-04-10 | End: 2021-04-10

## 2021-04-10 RX ORDER — PREDNISONE 10 MG/1
10 TABLET ORAL DAILY
Qty: 30 TABLET | Refills: 0 | Status: SHIPPED | OUTPATIENT
Start: 2021-04-10 | End: 2021-04-20

## 2021-04-10 RX ADMIN — IOPAMIDOL 75 ML: 755 INJECTION, SOLUTION INTRAVENOUS at 21:26

## 2021-04-10 RX ADMIN — GABAPENTIN 300 MG: 300 CAPSULE ORAL at 23:37

## 2021-04-10 ASSESSMENT — PAIN DESCRIPTION - DESCRIPTORS: DESCRIPTORS: ACHING

## 2021-04-10 ASSESSMENT — PAIN DESCRIPTION - PAIN TYPE: TYPE: ACUTE PAIN

## 2021-04-10 NOTE — ED PROVIDER NOTES
[address] [facility]  ED  EMERGENCY DEPARTMENT ENCOUNTER        Pt Name: Flaca Andino  MRN: [id number]  Armstrongfhenri 1970  Date of evaluation: 4/10/2021  Provider: ARAMIS Chan CNP  PCP: No primary care provider on file. I have seen and evaluated this patient with my supervising physician Donovan Milian MD.    [address] [place] [address]       Chief Complaint   Patient presents with    Migraine     pt states he had been having a headache for the last 6 weeks; seen in the ER several times; tonight after dinner pt felt like he was going to pass out and had blurred vision        HISTORY OF PRESENT ILLNESS   (Location, Timing/Onset, Context/Setting, Quality, Duration, Modifying Factors, Severity, Associated Signs and Symptoms)  Note limiting factors. Flaca Andino is a 45 y.o. male with medical history of anxiety, degenerative disc disease, diverticulitis, hypertension, manic depression, obesity, scoliosis, tachycardia, GERD, vasovagal, panic attacks, bipolar affective disorder, chronic back pain, chronic neck pain who presents the ED with complaints of episode of feeling lightheaded and near syncope that occurred approximately 30 minutes prior to arrival while patient was eating dinner. States he had finished eating dinner and felt very fatigued and like he was in a pass out. He notified his wife who drove him to the ED for further evaluation. States he has had a headache that feels like it starts in his right occipital region in the right cervical area and radiates up to the right temporal region. He does have some sensitivity to the right side of his scalp. He also has an associated headache this is been ongoing for the past 6 weeks. He has had 2 previous ED visits for this. He also has had some intermittent left-sided abdominal discomfort since the headache started 6 weeks ago. States he has been unable to follow-up with PCP due to insurance complications.   He does have a new appointment with PCP on 4/15/2021. He states the initial event 6 weeks ago occurred whenever he was at [address] [place] and pushing a shopping cart and had acute onset of the symptoms. He denies any recent trauma, accidents, injuries, or falls. He denies any history of PE, DVT, family history of bleeding or clotting disorders, recent hospitalization, surgery in the past month, or trauma in the past month. Patient denies being anticoagulated. He denies any associated chest pain, cough, wheezing, nausea, vomiting, diarrhea, visual disturbance, dysphagia, ataxia, numbness, tingling, rashes, fevers, weakness, leg swelling, dizzy, or syncope. He smokes half pack per day, denies alcohol use or street drugs. He has had cardiac testing to include a stress test a few months ago that was unremarkable. Denies history of a previous cardiac cath. He denies any contact with anyone who was tested positive for COVID-19 and he has not tested positive for COVID-19. Nursing Notes were all reviewed and agreed with or any disagreements were addressed in the HPI. REVIEW OF SYSTEMS    (2-9 systems for level 4, 10 or more for level 5)     Review of Systems    Positives and Pertinent negatives as per HPI. Except as noted above in the ROS, all other systems were reviewed and negative.        PAST MEDICAL HISTORY     Past Medical History:   Diagnosis Date    Anxiety     Chronic back pain     Chronic neck pain     Degenerative disc disease     Degenerative disc disease     Degenerative disc disease     Diverticulitis     Hypertension     Manic depression ([facility] [address]     Obesity     Scoliosis          SURGICAL HISTORY     Past Surgical History:   Procedure Laterality Date    KIDNEY STONE SURGERY           CURRENTMEDICATIONS       Discharge Medication List as of 4/10/2021 11:33 PM      CONTINUE these medications which have NOT CHANGED    Details   tamsulosin (FLOMAX) 0.4 MG capsule Take 1 capsule by mouth daily, Disp-30 capsule, R-0Print      lisinopril-hydroCHLOROthiazide (PRINZIDE;ZESTORETIC) 20-25 MG per tablet Take 1 tablet by mouth daily, Disp-30 tablet,R-0Print      ibuprofen (ADVIL;MOTRIN) 600 MG tablet Take 1 tablet by mouth 3 times daily (with meals), Disp-40 tablet,R-0Print      aspirin 81 MG chewable tablet Take 1 tablet by mouth daily. , Disp-30 tablet, R-3               ALLERGIES     Ciprofloxacin    FAMILYHISTORY     History reviewed. No pertinent family history. SOCIAL HISTORY       Social History     Tobacco Use    Smoking status: Current Every Day Smoker     Packs/day: 0.50     Years: 25.00     Pack years: 12.50     Types: Cigarettes    Smokeless tobacco: Never Used   Substance Use Topics    Alcohol use: Not Currently     Comment: OCC     Drug use: No       SCREENINGS    [place] Coma Scale  Eye Opening: Spontaneous  Best Verbal Response: Oriented  Best Motor Response: Obeys commands  Jama Coma Scale Score: 15        PHYSICAL EXAM    (up to 7 for level 4, 8 or more for level 5)     ED Triage Vitals [04/10/21 1958]   Enc Vitals Group      BP (!) 166/85      Pulse 117      Resp 18      Temp 98 °F (36.7 °C)      Temp Source Oral      SpO2 98 %      Weight 295 lb (133.8 kg)      Height 6' (1.829 m)       Physical Exam  Vitals signs and nursing note reviewed. Constitutional:       General: He is awake. Appearance: Normal appearance. He is well-developed. He is morbidly obese. HENT:      Head: Normocephalic and atraumatic. Nose: Nose normal.   Eyes:      General:         Right eye: No discharge. Left eye: No discharge. Neck:      Musculoskeletal: Normal range of motion. Cardiovascular:      Rate and Rhythm: Regular rhythm. Tachycardia present. Heart sounds: Normal heart sounds. Pulmonary:      Effort: Pulmonary effort is normal. No respiratory distress. Breath sounds: Normal breath sounds. Abdominal:      General: Bowel sounds are normal.      Palpations: Abdomen is soft. Tenderness: There is no abdominal tenderness. Musculoskeletal: Normal range of motion. Right lower leg: No edema. Left lower leg: No edema. Skin:     General: Skin is warm and dry. Coloration: Skin is not pale. Neurological:      General: No focal deficit present. Mental Status: He is alert and oriented to person, place, and time. Cranial Nerves: Cranial nerves are intact. Sensory: Sensation is intact. Motor: Motor function is intact. Coordination: Coordination is intact. Comments: Hand grasp strength 5/5  Pedal pushes 5/5  SLR neg bilateral   Psychiatric:         Behavior: Behavior normal. Behavior is cooperative.          DIAGNOSTIC RESULTS   LABS:    Labs Reviewed   COMPREHENSIVE METABOLIC PANEL - Abnormal; Notable for the following components:       Result Value    Sodium 133 (*)     Glucose 146 (*)     CREATININE 0.8 (*)     All other components within normal limits    Narrative:     Performed at:  [address], [facility] [place]   Phone [phone]   URINE RT REFLEX TO CULTURE - Abnormal; Notable for the following components:    Blood, Urine MODERATE (*)     All other components within normal limits    Narrative:     Performed at:  [address], [facility] [place]   Phone [phone]   MICROSCOPIC URINALYSIS - Abnormal; Notable for the following components:    RBC, UA 11-20 (*)     Bacteria, UA Rare (*)     All other components within normal limits    Narrative:     Performed at:  [address], [facility] [place]   Phone [phone]   CBC WITH AUTO DIFFERENTIAL    Narrative:     Performed at:  [address], [facility] [place]   Phone [phone]   TROPONIN    Narrative:     Performed at:  [facility]  [address],  [place], [place]   Phone [phone] PEPTIDE    Narrative:     Performed at:  [facility]) [facility]  [address] [facility],  [place]   Phone [phone]       All other labs were within normal range or not returned as of this dictation. EKG: All EKG's are interpreted by the Emergency Department Physician in the absence of a cardiologist.  Please see their note for interpretation of EKG. RADIOLOGY:   Non-plain film images such as CT, Ultrasound and MRI are read by the radiologist. Plain radiographic images are visualized and preliminarily interpreted by the ED Provider with the below findings:        Interpretation per the Radiologist below, if available at the time of this note:    CT CHEST PULMONARY EMBOLISM W CONTRAST   Final Result   1. Suboptimal opacification of the pulmonary arteries. Artifact degraded   evaluation of the pulmonary arteries. No acute pulmonary embolism to the   lobar arteries given limitation. 2. Infectious or inflammatory airway process. 3. Centrilobular emphysema. 4. Other findings as described. XR CHEST PORTABLE   Final Result   No airspace disease by radiograph. Ct Head Wo Contrast    Result Date: 4/7/2021  EXAMINATION: CT OF THE HEAD WITHOUT CONTRAST  4/6/2021 4:09 pm TECHNIQUE: CT of the head was performed without the administration of intravenous contrast. Dose modulation, iterative reconstruction, and/or weight based adjustment of the mA/kV was utilized to reduce the radiation dose to as low as reasonably achievable. COMPARISON: Prior studies most recent 03/15/2021. HISTORY: ORDERING SYSTEM PROVIDED HISTORY: headache TECHNOLOGIST PROVIDED HISTORY: Reason for exam:->headache Has a \"code stroke\" or \"stroke alert\" been called? ->No Decision Support Exception->Emergency Medical Condition (MA) Reason for Exam: persistent right posterior h/a x 1 months-seen in ER 3 wks ago with scans Acuity: Acute Type of Exam: Initial Relevant Medical/Surgical History: no prev hx FINDINGS: BRAIN/VENTRICLES: The ventricular system is normal in appearance. No evidence of mass effect or midline shift. No area of abnormal attenuation of brain parenchyma is identified. No abnormal extra-axial fluid collection is identified. ORBITS: The visualized portion of the orbits demonstrate no acute abnormality. SINUSES: The visualized paranasal sinuses and mastoid air cells demonstrate no acute abnormality. Minimal mucosal thickening identified within few ethmoid air cells and maxillary sinuses. Mucosal thickening with probable minimal inspissated mucus identified within the right sphenoid locule. SOFT TISSUES/SKULL:  No acute abnormality of the visualized skull or soft tissues. No evidence of acute intracranial abnormality. PROCEDURES   Unless otherwise noted below, none     Procedures    CRITICAL CARE TIME   N/A    CONSULTS:  None      EMERGENCY DEPARTMENT COURSE and DIFFERENTIAL DIAGNOSIS/MDM:   Vitals:    Vitals:    04/10/21 2206 04/10/21 2235 04/10/21 2305 04/10/21 2311   BP: (!) 140/74 128/72 131/75    Pulse: 91 89 101 91   Resp: 11 16 15 14   Temp:       TempSrc:       SpO2: 97% 97% 96% 96%   Weight:       Height:           Patient was given the following medications:  Medications   iopamidol (ISOVUE-370) 76 % injection 75 mL (75 mLs Intravenous Given 4/10/21 2126)   gabapentin (NEURONTIN) capsule 300 mg (300 mg Oral Given 4/10/21 2337)           Pertinent Labs & Imaging studies reviewed. (See chart for details)   -  Patient seen and evaluated in the emergency department. -  Triage and nursing notes reviewed and incorporated. -  Old chart records reviewed and incorporated. -  Patient case discussed with attending physician,  Dr. Robyn Luciano. They saw and examined patient.   -  Differential diagnosis includes:  PE, CVA, TIA, aneurysm, dissection, UTI, dehydration, SAH, ICH, temporal arteritis, cardiac Lightheaded    3. Hyponatremia    4. Hyperglycemia    5. Orthostasis          DISPOSITION/PLAN   DISPOSITION        PATIENT REFERREDTO:  [facility] ([facility]) Pre-Services  [phone]  Call in 2 days  As needed, If symptoms worsen    [facility]  ED  [address]  Go to   As needed    Nico Reyes Davion[place] [facility]  [phone]    Schedule an appointment as soon as possible for a visit         DISCHARGE MEDICATIONS:  Discharge Medication List as of 4/10/2021 11:33 PM      START taking these medications    Details   predniSONE (DELTASONE) 10 MG tablet Take 1 tablet by mouth daily for 10 doses Take 5 pills a day for 2 days then,  4 pills a day for 2 days then,  3 pills a day for 2 days then,  2 pills a day for 2 days then,  1 pill a day for 2 days. , Disp-30 tablet, R-0Print      gabapentin (NEURONTIN) 300 MG capsule Take 1 capsule by mouth 3 times daily for 30 days.  Intended supply: 30 days, Disp-90 capsule, R-0Normal             DISCONTINUED MEDICATIONS:  Discharge Medication List as of 4/10/2021 11:33 PM                 (Please note that portions of this note were completed with a voice recognition program.  Efforts were made to edit the dictations but occasionally words are mis-transcribed.)    ARAMIS Fournier CNP (electronically signed)            ARAMIS Fournier CNP  04/11/21 1294

## 2021-04-11 LAB
EKG ATRIAL RATE: 92 BPM
EKG DIAGNOSIS: NORMAL
EKG P AXIS: 60 DEGREES
EKG P-R INTERVAL: 162 MS
EKG Q-T INTERVAL: 348 MS
EKG QRS DURATION: 84 MS
EKG QTC CALCULATION (BAZETT): 430 MS
EKG R AXIS: 47 DEGREES
EKG T AXIS: 30 DEGREES
EKG VENTRICULAR RATE: 92 BPM

## 2021-04-11 PROCEDURE — 93010 ELECTROCARDIOGRAM REPORT: CPT | Performed by: INTERNAL MEDICINE

## 2021-04-11 NOTE — ED PROVIDER NOTES
I independently performed a history and physical on Em Ruano. All diagnostic, treatment, and disposition decisions were made by myself in conjunction with the advanced practice provider. For further details of [facility] emergency department encounter, please see Nikolai Aguero CNP's documentation. Patient reports about a month ago he was walking when he had the sudden onset of a severe pain in his right side of his neck and face and head and he immediately came to the ER and was evaluated. They did a stroke rule out work-up and found no cause for his symptoms. Since then he has continued to have right-sided head and neck pain ever since. He states occasionally will cross over the front of his head but mostly stays on the right side of the neck and will feel like a burning sensation and sometimes progress over his right shoulder. This has been present every day since this came on. He denies any chest pain. He denies any fevers, chills or sweats. No vomiting. No trouble with his vision or speech or balance. No numbness or weakness of the extremities. On exam the patient has no significant tenderness in the posterior neck. No step-offs, contusions or abrasions noted either. He is neurologically intact to the fingers and toes. EKG  The Ekg interpreted by me shows  normal sinus rhythm with a rate of 92  Axis is   Normal  QTc is  normal  Intervals and Durations are unremarkable. ST Segments: no acute change  No significant change from prior EKG dated 5 jun 2020    In my opinion, the patient's symptoms seem to be neuropathic in nature. He was recently diagnosed with possible shingles although he states he never broke out with a rash. I believe this was a misdiagnosis but proper in the sense that the pain being described is definitely neuropathic. For this reason I am starting him on Neurontin and a steroid taper.   Advised him to follow-up with neurology as soon as possible and return to the ER for any new or worsening symptoms such as chest pain, shortness of breath, fever or vomiting.          Jonatan Mendoza MD  04/10/21 6988

## 2021-05-11 ENCOUNTER — OFFICE VISIT (OUTPATIENT)
Dept: FAMILY MEDICINE CLINIC | Age: 51
End: 2021-05-11
Payer: COMMERCIAL

## 2021-05-11 VITALS
TEMPERATURE: 98.4 F | SYSTOLIC BLOOD PRESSURE: 104 MMHG | HEART RATE: 95 BPM | WEIGHT: 292.6 LBS | OXYGEN SATURATION: 96 % | DIASTOLIC BLOOD PRESSURE: 60 MMHG | HEIGHT: 72 IN | BODY MASS INDEX: 39.63 KG/M2

## 2021-05-11 DIAGNOSIS — Z87.442 HISTORY OF KIDNEY STONES: ICD-10-CM

## 2021-05-11 DIAGNOSIS — Z13.1 DIABETES MELLITUS SCREENING: ICD-10-CM

## 2021-05-11 DIAGNOSIS — I10 HYPERTENSION, UNSPECIFIED TYPE: Primary | ICD-10-CM

## 2021-05-11 DIAGNOSIS — Z12.11 SCREENING FOR COLON CANCER: ICD-10-CM

## 2021-05-11 DIAGNOSIS — Z76.89 ENCOUNTER TO ESTABLISH CARE: ICD-10-CM

## 2021-05-11 DIAGNOSIS — M79.2 NERVE PAIN: ICD-10-CM

## 2021-05-11 DIAGNOSIS — R51.9 INTRACTABLE HEADACHE, UNSPECIFIED CHRONICITY PATTERN, UNSPECIFIED HEADACHE TYPE: ICD-10-CM

## 2021-05-11 LAB — HBA1C MFR BLD: 5.3 %

## 2021-05-11 PROCEDURE — 4004F PT TOBACCO SCREEN RCVD TLK: CPT | Performed by: NURSE PRACTITIONER

## 2021-05-11 PROCEDURE — 83036 HEMOGLOBIN GLYCOSYLATED A1C: CPT | Performed by: NURSE PRACTITIONER

## 2021-05-11 PROCEDURE — 3017F COLORECTAL CA SCREEN DOC REV: CPT | Performed by: NURSE PRACTITIONER

## 2021-05-11 PROCEDURE — G8427 DOCREV CUR MEDS BY ELIG CLIN: HCPCS | Performed by: NURSE PRACTITIONER

## 2021-05-11 PROCEDURE — G8417 CALC BMI ABV UP PARAM F/U: HCPCS | Performed by: NURSE PRACTITIONER

## 2021-05-11 PROCEDURE — 99214 OFFICE O/P EST MOD 30 MIN: CPT | Performed by: NURSE PRACTITIONER

## 2021-05-11 RX ORDER — GABAPENTIN 300 MG/1
300 CAPSULE ORAL 3 TIMES DAILY
Qty: 90 CAPSULE | Refills: 1 | Status: SHIPPED | OUTPATIENT
Start: 2021-05-11 | End: 2022-03-23

## 2021-05-11 RX ORDER — LISINOPRIL AND HYDROCHLOROTHIAZIDE 25; 20 MG/1; MG/1
1 TABLET ORAL DAILY
Qty: 90 TABLET | Refills: 1 | Status: SHIPPED | OUTPATIENT
Start: 2021-05-11 | End: 2021-11-30

## 2021-05-11 NOTE — PROGRESS NOTES
Patient: Angie Guardado is a 45 y.o. male who presents today with the following Chief Complaint(s):  Chief Complaint   Patient presents with    New Patient     Establish Care         HPI-this is a 54-year-old patient establishing care  He is new to the [facility] system besides several hospital visits this year. He has a history of hypertension which is controlled. He currently takes Zestoretic 20/25 milligram daily. He denies no side effects from the medication and is doing well. He will need a refill of this today  He does have nerve pain on the right side of his head. He states that it is a shooting pain. He states that it is not headache pain it is more nervous. He has been to the ER and they state that he needs to be seen by neurology. A referral was placed today for him. The ER prescribed to gabapentin 300 mg tablet 3 times daily for him he states this has improved the pain tremendously and he is doing well on the medication. He will need a refill of his medication today. He states that his pain started in the neck 3 or 4 months ago but then moved up to the right side of his head. He will be needing a diabetes screening so a POCT A1c was done today it was 5.3%. He was also seen in the ER for kidney stones so he will need a referral to urology. Referral was placed  He will also need a referral to GI for colonoscopy this was placed today. Current Outpatient Medications   Medication Sig Dispense Refill    gabapentin (NEURONTIN) 300 MG capsule Take 1 capsule by mouth 3 times daily for 60 days. Intended supply: 30 days 90 capsule 1    lisinopril-hydroCHLOROthiazide (PRINZIDE;ZESTORETIC) 20-25 MG per tablet Take 1 tablet by mouth daily 90 tablet 1    aspirin 81 MG chewable tablet Take 1 tablet by mouth daily. 30 tablet 3     No current facility-administered medications for this visit.         Patient's past medical history, surgical history, family history, medications,  and allergies  were all type     Diabetes mellitus screening     History of kidney stones     Screening for colon cancer        Controlled SubstancesMonitoring: Periodic Controlled Substance Monitoring: Possible medication side effects, risk of tolerance/dependence & alternative treatments discussed., No signs of potential drug abuse or diversion identified. (Marisela Sher, APRN - CNP)    Plan:  1. Hypertension, unspecified type  Stable  - lisinopril-hydroCHLOROthiazide (PRINZIDE;ZESTORETIC) 20-25 MG per tablet; Take 1 tablet by mouth daily  Dispense: 90 tablet; Refill: 1    2. Encounter to establish care  Established care    3. Nerve pain  Improved  - gabapentin (NEURONTIN) 300 MG capsule; Take 1 capsule by mouth 3 times daily for 60 days. Intended supply: 30 days  Dispense: 90 capsule; Refill: 1    4. Intractable headache, unspecified chronicity pattern, unspecified headache type  - AFL - Flor Davis DO, Neurology, [facility]    5. Diabetes mellitus screening  - POCT glycosylated hemoglobin (Hb A1C)-5.3%    6. History of kidney stones  - AFL - Homero Sheth MD, Urology, [facility]    7. Screening for colon cancer  - AFL - Murray Santos MD, Gastroenterology, [facility] KAREY Velásquez    Reviewed treatment plan with patient. Patient verbalized understanding to treatment plan and questions were answered. [address]  [place], [address]

## 2021-07-22 ENCOUNTER — APPOINTMENT (OUTPATIENT)
Dept: CT IMAGING | Age: 51
End: 2021-07-22
Payer: COMMERCIAL

## 2021-07-22 ENCOUNTER — APPOINTMENT (OUTPATIENT)
Dept: GENERAL RADIOLOGY | Age: 51
End: 2021-07-22
Payer: COMMERCIAL

## 2021-07-22 ENCOUNTER — HOSPITAL ENCOUNTER (EMERGENCY)
Age: 51
Discharge: HOME | End: 2021-07-23
Attending: EMERGENCY MEDICINE
Payer: COMMERCIAL

## 2021-07-22 VITALS
WEIGHT: 295 LBS | HEART RATE: 87 BPM | OXYGEN SATURATION: 98 % | BODY MASS INDEX: 39.96 KG/M2 | TEMPERATURE: 98.1 F | SYSTOLIC BLOOD PRESSURE: 123 MMHG | HEIGHT: 72 IN | RESPIRATION RATE: 16 BRPM | DIASTOLIC BLOOD PRESSURE: 82 MMHG

## 2021-07-22 DIAGNOSIS — R10.13 ABDOMINAL PAIN, EPIGASTRIC: Primary | ICD-10-CM

## 2021-07-22 LAB
A/G RATIO: 2 (ref 1.1–2.2)
ALBUMIN SERPL-MCNC: 4.7 G/DL (ref 3.4–5)
ALP BLD-CCNC: 99 U/L (ref 40–129)
ALT SERPL-CCNC: 29 U/L (ref 10–40)
ANION GAP SERPL CALCULATED.3IONS-SCNC: 11 MMOL/L (ref 3–16)
AST SERPL-CCNC: 23 U/L (ref 15–37)
BASOPHILS ABSOLUTE: 0.1 K/UL (ref 0–0.2)
BASOPHILS RELATIVE PERCENT: 0.6 %
BILIRUB SERPL-MCNC: 0.3 MG/DL (ref 0–1)
BUN BLDV-MCNC: 14 MG/DL (ref 7–20)
CALCIUM SERPL-MCNC: 9.2 MG/DL (ref 8.3–10.6)
CHLORIDE BLD-SCNC: 99 MMOL/L (ref 99–110)
CO2: 26 MMOL/L (ref 21–32)
CREAT SERPL-MCNC: 1.2 MG/DL (ref 0.9–1.3)
EOSINOPHILS ABSOLUTE: 0.3 K/UL (ref 0–0.6)
EOSINOPHILS RELATIVE PERCENT: 3.5 %
GFR AFRICAN AMERICAN: >60
GFR NON-AFRICAN AMERICAN: >60
GLOBULIN: 2.4 G/DL
GLUCOSE BLD-MCNC: 114 MG/DL (ref 70–99)
HCT VFR BLD CALC: 42.5 % (ref 40.5–52.5)
HEMOGLOBIN: 14.7 G/DL (ref 13.5–17.5)
LIPASE: 42 U/L (ref 13–60)
LYMPHOCYTES ABSOLUTE: 2 K/UL (ref 1–5.1)
LYMPHOCYTES RELATIVE PERCENT: 21.5 %
MCH RBC QN AUTO: 34.2 PG (ref 26–34)
MCHC RBC AUTO-ENTMCNC: 34.7 G/DL (ref 31–36)
MCV RBC AUTO: 98.7 FL (ref 80–100)
MONOCYTES ABSOLUTE: 0.5 K/UL (ref 0–1.3)
MONOCYTES RELATIVE PERCENT: 5.9 %
NEUTROPHILS ABSOLUTE: 6.2 K/UL (ref 1.7–7.7)
NEUTROPHILS RELATIVE PERCENT: 68.5 %
PDW BLD-RTO: 12.9 % (ref 12.4–15.4)
PLATELET # BLD: 307 K/UL (ref 135–450)
PMV BLD AUTO: 6.4 FL (ref 5–10.5)
POTASSIUM REFLEX MAGNESIUM: 4 MMOL/L (ref 3.5–5.1)
RBC # BLD: 4.31 M/UL (ref 4.2–5.9)
SODIUM BLD-SCNC: 136 MMOL/L (ref 136–145)
TOTAL PROTEIN: 7.1 G/DL (ref 6.4–8.2)
TROPONIN: <0.01 NG/ML
TROPONIN: <0.01 NG/ML
WBC # BLD: 9.1 K/UL (ref 4–11)

## 2021-07-22 PROCEDURE — 85025 COMPLETE CBC W/AUTO DIFF WBC: CPT

## 2021-07-22 PROCEDURE — 83690 ASSAY OF LIPASE: CPT

## 2021-07-22 PROCEDURE — 71046 X-RAY EXAM CHEST 2 VIEWS: CPT

## 2021-07-22 PROCEDURE — 6360000004 HC RX CONTRAST MEDICATION: Performed by: EMERGENCY MEDICINE

## 2021-07-22 PROCEDURE — 99284 EMERGENCY DEPT VISIT MOD MDM: CPT

## 2021-07-22 PROCEDURE — 84484 ASSAY OF TROPONIN QUANT: CPT

## 2021-07-22 PROCEDURE — 93005 ELECTROCARDIOGRAM TRACING: CPT | Performed by: EMERGENCY MEDICINE

## 2021-07-22 PROCEDURE — 80053 COMPREHEN METABOLIC PANEL: CPT

## 2021-07-22 PROCEDURE — 74177 CT ABD & PELVIS W/CONTRAST: CPT

## 2021-07-22 RX ORDER — PANTOPRAZOLE SODIUM 40 MG/1
40 TABLET, DELAYED RELEASE ORAL
Status: DISCONTINUED | OUTPATIENT
Start: 2021-07-23 | End: 2021-07-23

## 2021-07-22 RX ADMIN — IOPAMIDOL 75 ML: 755 INJECTION, SOLUTION INTRAVENOUS at 21:37

## 2021-07-22 ASSESSMENT — PAIN DESCRIPTION - LOCATION: LOCATION: CHEST

## 2021-07-22 ASSESSMENT — PAIN SCALES - GENERAL: PAINLEVEL_OUTOF10: 3

## 2021-07-22 ASSESSMENT — PAIN DESCRIPTION - PAIN TYPE: TYPE: ACUTE PAIN

## 2021-07-23 LAB
EKG ATRIAL RATE: 97 BPM
EKG DIAGNOSIS: NORMAL
EKG P AXIS: 74 DEGREES
EKG P-R INTERVAL: 160 MS
EKG Q-T INTERVAL: 344 MS
EKG QRS DURATION: 88 MS
EKG QTC CALCULATION (BAZETT): 436 MS
EKG R AXIS: 51 DEGREES
EKG T AXIS: 35 DEGREES
EKG VENTRICULAR RATE: 97 BPM

## 2021-07-23 PROCEDURE — 6370000000 HC RX 637 (ALT 250 FOR IP): Performed by: EMERGENCY MEDICINE

## 2021-07-23 PROCEDURE — 93010 ELECTROCARDIOGRAM REPORT: CPT | Performed by: INTERNAL MEDICINE

## 2021-07-23 RX ORDER — PANTOPRAZOLE SODIUM 40 MG/1
40 TABLET, DELAYED RELEASE ORAL ONCE
Status: COMPLETED | OUTPATIENT
Start: 2021-07-23 | End: 2021-07-23

## 2021-07-23 RX ADMIN — PANTOPRAZOLE SODIUM 40 MG: 40 TABLET, DELAYED RELEASE ORAL at 00:17

## 2021-07-23 RX ADMIN — MAGNESIUM HYDROXIDE/ALUMINUM HYDROXICE/SIMETHICONE: 120; 1200; 1200 SUSPENSION ORAL at 00:17

## 2021-07-23 NOTE — ED PROVIDER NOTES
[facility] Emergency Department      CHIEF COMPLAINT  Chest Pain (past 3-4 days left sided. pain comes and goes. + shortness of breath at times. )      HISTORY OF PRESENT ILLNESS  Haydee Gosselin is a 45 y.o. male with a history of chronic back pain and HTN presents with epigastric abd pain. He has had it intermittently for the past 4 days. The triage note said it was chest pain, but he is pointing to his epigastrium. HE sates sometimes the pain moves higher into his chest, but it is mostly in the epigastric region. NO nausea. It is slightly worsened by eating. He has some mild assoc SOB, but states it is just when the pain is more intense it takes his breath away. No fever or cough. He does not have a h/o known CAD. He has not had any abd surgeries and does not have a known h/o GERD. No other complaints, modifying factors or associated symptoms. I have reviewed the following from the nursing documentation. Past Medical History:   Diagnosis Date    Anxiety     Chronic back pain     Chronic neck pain     Degenerative disc disease     Degenerative disc disease     Degenerative disc disease     Diverticulitis     Hypertension     Manic depression ([address]     Obesity     Scoliosis      Past Surgical History:   Procedure Laterality Date    KIDNEY STONE SURGERY       History reviewed. No pertinent family history.   Social History     Socioeconomic History    Marital status:      Spouse name: Not on file    Number of children: Not on file    Years of education: Not on file    Highest education level: Not on file   Occupational History    Not on file   Tobacco Use    Smoking status: Current Every Day Smoker     Packs/day: 0.50     Years: 25.00     Pack years: 12.50     Types: Cigarettes    Smokeless tobacco: Never Used   Vaping Use    Vaping Use: Never used   Substance and Sexual Activity    Alcohol use: Not Currently     Comment: OCC     Drug use: No    Sexual activity: Yes     Partners: Female   Other Topics Concern    Not on file   Social History Narrative    Not on file     Social Determinants of Health     Financial Resource Strain:     Difficulty of Paying Living Expenses:    Food Insecurity:     Worried About Running Out of Food in the Last Year:     [address] in the Last Year:    Transportation Needs:     Lack of Transportation (Medical):  Lack of Transportation (Non-Medical):    Physical Activity:     Days of Exercise per Week:     Minutes of Exercise per Session:    Stress:     Feeling of Stress :    Social Connections:     Frequency of Communication with Friends and Family:     Frequency of Social Gatherings with Friends and Family:     Attends [employer] Services:     Active Member of Clubs or Organizations:     Attends Club or Organization Meetings:     Marital Status:    Intimate Partner Violence:     Fear of Current or Ex-Partner:     Emotionally Abused:     Physically Abused:     Sexually Abused:      No current facility-administered medications for this encounter. Current Outpatient Medications   Medication Sig Dispense Refill    gabapentin (NEURONTIN) 300 MG capsule Take 1 capsule by mouth 3 times daily for 60 days. Intended supply: 30 days 90 capsule 1    lisinopril-hydroCHLOROthiazide (PRINZIDE;ZESTORETIC) 20-25 MG per tablet Take 1 tablet by mouth daily 90 tablet 1    aspirin 81 MG chewable tablet Take 1 tablet by mouth daily. 30 tablet 3     Allergies   Allergen Reactions    Ciprofloxacin Itching       REVIEW OF SYSTEMS  10 systems reviewed, pertinent positives per HPI otherwise noted to be negative. PHYSICAL EXAM  /82   Pulse 87   Temp 98.1 °F (36.7 °C) (Oral)   Resp 16   Ht 6' (1.829 m)   Wt 295 lb (133.8 kg)   SpO2 98%   BMI 40.01 kg/m²   GENERAL APPEARANCE: Awake and alert. Cooperative. No acute distress. HEAD: Normocephalic. Atraumatic. EYES: PERRL. EOM's grossly intact.   ENT: Mucous membranes are moist.   NECK: Supple, trachea midline. HEART: RRR. LUNGS: Respirations unlabored. CTAB. Good air exchange. No wheezes, rales, or rhonchi. Speaking comfortably in full sentences. ABDOMEN: Soft. Non-distended. Minimal epigastric ttp. No guarding or rebound. EXTREMITIES: No peripheral edema. MAEE. No acute deformities. SKIN: Warm, dry and intact. No acute rashes. NEUROLOGICAL: Alert and oriented X 3. CN II-XII grossly intact. Strength 5/5, sensation intact. Normal coordination. PSYCHIATRIC: Normal mood and affect. LABS  I have reviewed all labs for this visit.    Results for orders placed or performed during the hospital encounter of 07/22/21   CBC auto differential   Result Value Ref Range    WBC 9.1 4.0 - 11.0 K/uL    RBC 4.31 4.20 - 5.90 M/uL    Hemoglobin 14.7 13.5 - 17.5 g/dL    Hematocrit 42.5 40.5 - 52.5 %    MCV 98.7 80.0 - 100.0 fL    MCH 34.2 (H) 26.0 - 34.0 pg    MCHC 34.7 31.0 - 36.0 g/dL    RDW 12.9 12.4 - 15.4 %    Platelets 914 349 - 201 K/uL    MPV 6.4 5.0 - 10.5 fL    Neutrophils % 68.5 %    Lymphocytes % 21.5 %    Monocytes % 5.9 %    Eosinophils % 3.5 %    Basophils % 0.6 %    Neutrophils Absolute 6.2 1.7 - 7.7 K/uL    Lymphocytes Absolute 2.0 1.0 - 5.1 K/uL    Monocytes Absolute 0.5 0.0 - 1.3 K/uL    Eosinophils Absolute 0.3 0.0 - 0.6 K/uL    Basophils Absolute 0.1 0.0 - 0.2 K/uL   Comprehensive Metabolic Panel w/ Reflex to MG   Result Value Ref Range    Sodium 136 136 - 145 mmol/L    Potassium reflex Magnesium 4.0 3.5 - 5.1 mmol/L    Chloride 99 99 - 110 mmol/L    CO2 26 21 - 32 mmol/L    Anion Gap 11 3 - 16    Glucose 114 (H) 70 - 99 mg/dL    BUN 14 7 - 20 mg/dL    CREATININE 1.2 0.9 - 1.3 mg/dL    GFR Non-African American >60 >60    GFR African American >60 >60    Calcium 9.2 8.3 - 10.6 mg/dL    Total Protein 7.1 6.4 - 8.2 g/dL    Albumin 4.7 3.4 - 5.0 g/dL    Albumin/Globulin Ratio 2.0 1.1 - 2.2    Total Bilirubin 0.3 0.0 - 1.0 mg/dL    Alkaline Phosphatase 99 40 - 129 U/L    ALT 29 10 - 40 U/L    AST 23 15 - 37 U/L    Globulin 2.4 g/dL   Troponin   Result Value Ref Range    Troponin <0.01 <0.01 ng/mL   Lipase   Result Value Ref Range    Lipase 42.0 13.0 - 60.0 U/L   Troponin   Result Value Ref Range    Troponin <0.01 <0.01 ng/mL   EKG 12 Lead   Result Value Ref Range    Ventricular Rate 97 BPM    Atrial Rate 97 BPM    P-R Interval 160 ms    QRS Duration 88 ms    Q-T Interval 344 ms    QTc Calculation (Bazett) 436 ms    P Axis 74 degrees    R Axis 51 degrees    T Axis 35 degrees    Diagnosis       Normal sinus rhythmNormal ECGConfirmed by MICHAEL Dumont MD ([id number]) on 7/23/2021 2:21:49 PM       EKG  The Ekg interpreted by myself  normal sinus rhythm with a rate of 97  Axis is   Normal  QTc is  normal  Intervals and Durations are unremarkable. No specific ST-T wave changes appreciated. No evidence of acute ischemia. No significant change from prior EKG dated 4/10/21    Cardiac Monitoring: The cardiac monitor revealed normal sinus rhythm as interpreted by me. The cardiac monitor was ordered secondary to the patient's complaint of chest pain and to monitor the patient for dysrhythmia. RADIOLOGY  X-RAYS:  I have reviewed radiologic plain film image(s). ALL OTHER NON-PLAIN FILM IMAGES SUCH AS CT, ULTRASOUND AND MRI HAVE BEEN READ BY THE RADIOLOGIST. CT ABDOMEN PELVIS W IV CONTRAST Additional Contrast? None   Final Result   1. No evidence of acute intra-abdominal abnormality. No evidence of bowel   obstruction, intraperitoneal free air, or abscess. 2. Diverticulosis of the colon with no focal finding to suggest changes of   diverticulitis. 3. Persistent findings suggestive of presence of changes of fatty   infiltration of the liver as described above. XR CHEST (2 VW)   Final Result   No acute abnormality                    Rechecks: Physical assessment performed. He is feeling better after a GI cocktail and pepcid here. He has been updated on his labs and imaging. ED COURSE/MDM  Patient seen and evaluated. Here the patient is afebrile with normal vitals signs. He is nontoxic appearing. He is having abd pain, not CP. He describes what sounds like gastritis vs GERD. EKG with no ischemic changes, trop neg x 2 sets. Lipase wnl. Labs reassuring. CT abd is unremarkable. Feels better here after GI cocktail. Will refer to GI. I do not think he needs to be admitted and I have very low suspicion for ACS. Will instruct to start PPI at home. Old records reviewed. Labs and imaging reviewed and results discussed with patient. Patient was reassessed as noted above . Plan of care discussed with patient. Patient in agreement with plan. Strict return precautions have been given. Patient was given scripts for the following medications. I counseled patient how to take these medications. Discharge Medication List as of 7/23/2021 12:25 AM          CLINICAL IMPRESSION  1. Abdominal pain, epigastric        Blood pressure 123/82, pulse 87, temperature 98.1 °F (36.7 °C), temperature source Oral, resp. rate 16, height 6' (1.829 m), weight 295 lb (133.8 kg), SpO2 98 %. [facility] was discharged to home in stable condition.     (Please note this note was completed with a voice recognition program.  Efforts were made to edit the dictations but occasionally words are mis-transcribed.)       Lynette Florentino MD  07/25/21 [id number]

## 2021-07-23 NOTE — ED NOTES
Pt ok to d/c to home. Pt given d/c instructions. Pt verbalized understating including Rx and follow up care. Pt ambulated to lobby for ride home.  0 s/s of distress at time of d/c.          Kassy Washington RN  07/23/21 0028

## 2021-11-30 DIAGNOSIS — I10 HYPERTENSION, UNSPECIFIED TYPE: ICD-10-CM

## 2021-11-30 RX ORDER — LISINOPRIL AND HYDROCHLOROTHIAZIDE 25; 20 MG/1; MG/1
TABLET ORAL
Qty: 90 TABLET | Refills: 1 | Status: SHIPPED | OUTPATIENT
Start: 2021-11-30 | End: 2022-04-05 | Stop reason: SDUPTHER

## 2022-02-02 ENCOUNTER — HOSPITAL ENCOUNTER (EMERGENCY)
Age: 52
Discharge: HOME | End: 2022-02-02
Payer: COMMERCIAL

## 2022-02-02 VITALS
OXYGEN SATURATION: 96 % | DIASTOLIC BLOOD PRESSURE: 87 MMHG | RESPIRATION RATE: 14 BRPM | TEMPERATURE: 98.4 F | WEIGHT: 295 LBS | HEIGHT: 72 IN | BODY MASS INDEX: 39.96 KG/M2 | SYSTOLIC BLOOD PRESSURE: 144 MMHG | HEART RATE: 102 BPM

## 2022-02-02 DIAGNOSIS — Z72.0 TOBACCO USE: ICD-10-CM

## 2022-02-02 DIAGNOSIS — I10 HYPERTENSION, UNSPECIFIED TYPE: Primary | ICD-10-CM

## 2022-02-02 LAB
A/G RATIO: 2 (ref 1.1–2.2)
ALBUMIN SERPL-MCNC: 4.9 G/DL (ref 3.4–5)
ALP BLD-CCNC: 118 U/L (ref 40–129)
ALT SERPL-CCNC: 35 U/L (ref 10–40)
ANION GAP SERPL CALCULATED.3IONS-SCNC: 12 MMOL/L (ref 3–16)
AST SERPL-CCNC: 20 U/L (ref 15–37)
BASOPHILS ABSOLUTE: 0.1 K/UL (ref 0–0.2)
BASOPHILS RELATIVE PERCENT: 0.6 %
BILIRUB SERPL-MCNC: 0.3 MG/DL (ref 0–1)
BUN BLDV-MCNC: 13 MG/DL (ref 7–20)
CALCIUM SERPL-MCNC: 9.8 MG/DL (ref 8.3–10.6)
CHLORIDE BLD-SCNC: 99 MMOL/L (ref 99–110)
CO2: 26 MMOL/L (ref 21–32)
CREAT SERPL-MCNC: 0.8 MG/DL (ref 0.9–1.3)
EKG ATRIAL RATE: 94 BPM
EKG DIAGNOSIS: NORMAL
EKG P AXIS: 53 DEGREES
EKG P-R INTERVAL: 162 MS
EKG Q-T INTERVAL: 348 MS
EKG QRS DURATION: 88 MS
EKG QTC CALCULATION (BAZETT): 435 MS
EKG R AXIS: 41 DEGREES
EKG T AXIS: 19 DEGREES
EKG VENTRICULAR RATE: 94 BPM
EOSINOPHILS ABSOLUTE: 0.2 K/UL (ref 0–0.6)
EOSINOPHILS RELATIVE PERCENT: 1.8 %
GFR AFRICAN AMERICAN: >60
GFR NON-AFRICAN AMERICAN: >60
GLUCOSE BLD-MCNC: 120 MG/DL (ref 70–99)
HCT VFR BLD CALC: 50.5 % (ref 40.5–52.5)
HEMOGLOBIN: 17.2 G/DL (ref 13.5–17.5)
LYMPHOCYTES ABSOLUTE: 1.8 K/UL (ref 1–5.1)
LYMPHOCYTES RELATIVE PERCENT: 18.2 %
MCH RBC QN AUTO: 33.7 PG (ref 26–34)
MCHC RBC AUTO-ENTMCNC: 34.1 G/DL (ref 31–36)
MCV RBC AUTO: 98.9 FL (ref 80–100)
MONOCYTES ABSOLUTE: 0.6 K/UL (ref 0–1.3)
MONOCYTES RELATIVE PERCENT: 6 %
NEUTROPHILS ABSOLUTE: 7.1 K/UL (ref 1.7–7.7)
NEUTROPHILS RELATIVE PERCENT: 73.4 %
PDW BLD-RTO: 13.1 % (ref 12.4–15.4)
PLATELET # BLD: 316 K/UL (ref 135–450)
PMV BLD AUTO: 6.8 FL (ref 5–10.5)
POTASSIUM REFLEX MAGNESIUM: 3.9 MMOL/L (ref 3.5–5.1)
RBC # BLD: 5.1 M/UL (ref 4.2–5.9)
SODIUM BLD-SCNC: 137 MMOL/L (ref 136–145)
TOTAL PROTEIN: 7.4 G/DL (ref 6.4–8.2)
TROPONIN: <0.01 NG/ML
WBC # BLD: 9.6 K/UL (ref 4–11)

## 2022-02-02 PROCEDURE — 93005 ELECTROCARDIOGRAM TRACING: CPT | Performed by: PHYSICIAN ASSISTANT

## 2022-02-02 PROCEDURE — 93010 ELECTROCARDIOGRAM REPORT: CPT | Performed by: INTERNAL MEDICINE

## 2022-02-02 PROCEDURE — 99282 EMERGENCY DEPT VISIT SF MDM: CPT

## 2022-02-02 PROCEDURE — 85025 COMPLETE CBC W/AUTO DIFF WBC: CPT

## 2022-02-02 PROCEDURE — 80053 COMPREHEN METABOLIC PANEL: CPT

## 2022-02-02 PROCEDURE — 84484 ASSAY OF TROPONIN QUANT: CPT

## 2022-02-02 ASSESSMENT — PAIN DESCRIPTION - DESCRIPTORS: DESCRIPTORS: ACHING

## 2022-02-02 ASSESSMENT — PAIN DESCRIPTION - LOCATION: LOCATION: HEAD

## 2022-02-02 ASSESSMENT — PAIN DESCRIPTION - PAIN TYPE: TYPE: ACUTE PAIN

## 2022-02-02 ASSESSMENT — PAIN SCALES - GENERAL: PAINLEVEL_OUTOF10: 2

## 2022-02-02 NOTE — ED PROVIDER NOTES
[address] [facility]  ED      CHIEF COMPLAINT  Hypertension (takes Lisinopril, stopped taking after [place]. was diagnosed with covid at same time, started taking meds again 3 days ago. 562/473 systolic at home.)      [facility] VISIT  Evaluated by AMBER. My supervising physician was available for consultation. HISTORY OF PRESENT ILLNESS  Alison Marr is a 45 y.o. male history HTN, tobacco use presents emergency room for evaluation of elevated blood pressure readings at home. Patient states that he had Covid near [place] and stopped taking his blood pressure medication at that time. He states that over the past few days he started feeling his blood pressure was high and checked it with systolics around 646/252 at home. Patient restarted his lisinopril only 3 days ago. Patient states he is concerned about his elevated blood pressure readings. He also reports increased stressors at home including financial stressors due to being out of work from [address]. He states that his wife recently ended her quarantine for Covid. Patient does have a primary care provider and states he plans to follow-up with them. Patient has no chest pain or difficulty breathing. No pleuritic chest pain. He states he gets a tingling in his lips and his hands where his blood pressure is high. This is bilateral.  No other complaints, modifying factors or associated symptoms. Nursing notes reviewed. Past Medical History:   Diagnosis Date    Anxiety     Chronic back pain     Chronic neck pain     Degenerative disc disease     Degenerative disc disease     Degenerative disc disease     Diverticulitis     Hypertension     Manic depression ([address]     Obesity     Scoliosis      Past Surgical History:   Procedure Laterality Date    KIDNEY STONE SURGERY       No family history on file.   Social History     Socioeconomic History    Marital status:      Spouse name: Not on file    Number of children: Not on file    Years of education: Not on file    Highest education level: Not on file   Occupational History    Not on file   Tobacco Use    Smoking status: Current Every Day Smoker     Packs/day: 0.50     Years: 25.00     Pack years: 12.50     Types: Cigarettes    Smokeless tobacco: Never Used   Vaping Use    Vaping Use: Never used   Substance and Sexual Activity    Alcohol use: Not Currently     Comment: OCC     Drug use: No    Sexual activity: Yes     Partners: Female   Other Topics Concern    Not on file   Social History Narrative    Not on file     Social Determinants of Health     Financial Resource Strain:     Difficulty of Paying Living Expenses: Not on file   Food Insecurity:     Worried About Running Out of Food in the Last Year: Not on file    Amanda of Food in the Last Year: Not on file   Transportation Needs:     Lack of Transportation (Medical): Not on file    Lack of Transportation (Non-Medical): Not on file   Physical Activity:     Days of Exercise per Week: Not on file    Minutes of Exercise per Session: Not on file   Stress:     Feeling of Stress : Not on file   Social Connections:     Frequency of Communication with Friends and Family: Not on file    Frequency of Social Gatherings with Friends and Family: Not on file    Attends [employer] Services: Not on file    Active Member of [address] [place] [address] [employer] or Organizations: Not on file    Attends Club or Organization Meetings: Not on file    Marital Status: Not on file   Intimate Partner Violence:     Fear of Current or Ex-Partner: Not on file    Emotionally Abused: Not on file    Physically Abused: Not on file    Sexually Abused: Not on file   Housing Stability:     Unable to Pay for Housing in the Last Year: Not on file    Number of [place] in the Last Year: Not on file    Unstable Housing in the Last Year: Not on file     No current facility-administered medications for this encounter.      Current Outpatient Medications   Medication Sig Dispense Refill    lisinopril-hydroCHLOROthiazide (PRINZIDE;ZESTORETIC) 20-25 MG per tablet TAKE 1 TABLET BY MOUTH EVERY DAY 90 tablet 1    gabapentin (NEURONTIN) 300 MG capsule Take 1 capsule by mouth 3 times daily for 60 days. Intended supply: 30 days 90 capsule 1    aspirin 81 MG chewable tablet Take 1 tablet by mouth daily. 30 tablet 3     Allergies   Allergen Reactions    Ciprofloxacin Itching       REVIEW OF SYSTEMS  10 systems reviewed, pertinent positives per HPI otherwise noted to be negative    PHYSICAL EXAM  BP (!) 160/94   Pulse 105   Temp 98.4 °F (36.9 °C) (Oral)   Resp 16   Ht 6' (1.829 m)   Wt 295 lb (133.8 kg)   SpO2 97%   BMI 40.01 kg/m²   GENERAL APPEARANCE: Awake and alert. Cooperative. HEAD: Normocephalic. Atraumatic. EYES: EOM's grossly intact. ENT: Mucous membranes are moist.   NECK: Supple. HEART: RRR. No murmurs. LUNGS: Respirations unlabored. CTAB. Good air exchange. Speaking comfortably in full sentences. ABDOMEN: Soft. Non-distended. Non-tender. No guarding or rebound. No masses. No organomegaly. EXTREMITIES: No peripheral edema. Moves all extremities equally. All extremities neurovascularly intact. SKIN: Warm and dry. No acute rashes. NEUROLOGICAL: Alert and oriented. CN's 2-12 intact. No gross facial drooping. Strength 5/5, sensation intact. PSYCHIATRIC: Normal mood and affect.     RADIOLOGY  No orders to display       LABS  Labs Reviewed   COMPREHENSIVE METABOLIC PANEL W/ REFLEX TO MG FOR LOW K - Abnormal; Notable for the following components:       Result Value    Glucose 120 (*)     CREATININE 0.8 (*)     All other components within normal limits    Narrative:     Performed at:  [facility]) [address], [facility] [place]   Phone [phone]   CBC WITH AUTO DIFFERENTIAL    Narrative:     Performed at:  [facility]) [address], [address] [place] Phone [phone]   TROPONIN    Narrative:     Performed at:  [facility]) [facility]  [address] [place] [address],  [place], [address] [place] [address]   Phone [phone]       PROCEDURES  Unless otherwise noted below, none  Procedures             CRITICAL CARE TIME  The total critical care time spent while evaluating and treating this patient was 0 minutes. This excludes time spent doing separately billable procedures. This includes time at the bedside, data interpretation, medication management, obtaining critical history from collateral sources if the patient is unable to provide it directly, and physician consultation. Specifics of interventions taken and potentially life-threatening diagnostic considerations are listed above in the medical decision making. MDM  MDM  40-year-old male presents ED for evaluation of elevated blood pressure readings at home. Patient has been noncompliant with his medications since [place] roughly 1 month of his day. Recently restarted his lisinopril 3 days ago. On arrival to ED blood pressure 160/115 reports taking his medication 30 minutes PTA. No other complaint at this time. Patient questions whether we should increase his dose however I recommend that we continue his current dose of medication and plan to follow-up with his primary care provider from outpatient basis. Will check lab work to rule out any acute kidney injuries or other endorgan damage. Please for attending note for EKG interpretation however no ST elevation was appreciated to inversions. Ventricular rate of 94 bpm.  Patient's lab work was obtained which demonstrated no leukocytosis, no kidney injury troponin within normal limits. At this time will recommend the patient contact primary care provider upon discharge to discuss further management of his elevated blood pressure readings at home.      Did discuss with the patient options for smoking sensation including not limited to nicotine replacement therapy. DISPOSITION  Patient was discharged to home in good condition. CLINICAL IMPRESSION  1. Hypertension, unspecified type    2.  Tobacco use            Briseida Deng PA-C  02/02/22 [id number]

## 2022-02-02 NOTE — ED PROVIDER NOTES
Patient seen and evaluated by AMBER. EKG: Normal sinus rhythm with a rate 94. Normal axis peer normal intervals and durations. No ST or T wave changes appreciated. No acute signs of ischemia.      Ansley Stephen DO  02/02/22 4910

## 2022-03-23 ENCOUNTER — OFFICE VISIT (OUTPATIENT)
Dept: FAMILY MEDICINE CLINIC | Age: 52
End: 2022-03-23
Payer: COMMERCIAL

## 2022-03-23 VITALS
DIASTOLIC BLOOD PRESSURE: 80 MMHG | SYSTOLIC BLOOD PRESSURE: 126 MMHG | HEIGHT: 72 IN | BODY MASS INDEX: 38.93 KG/M2 | OXYGEN SATURATION: 96 % | RESPIRATION RATE: 16 BRPM | HEART RATE: 92 BPM | WEIGHT: 287.4 LBS | TEMPERATURE: 96.3 F

## 2022-03-23 DIAGNOSIS — M54.9 BACK PAIN WITH RADIATION: ICD-10-CM

## 2022-03-23 DIAGNOSIS — E66.01 MORBID OBESITY (HCC): ICD-10-CM

## 2022-03-23 DIAGNOSIS — Z72.0 CURRENT TOBACCO USE: ICD-10-CM

## 2022-03-23 DIAGNOSIS — F31.70 BIPOLAR DISORDER IN FULL REMISSION, MOST RECENT EPISODE UNSPECIFIED TYPE (HCC): ICD-10-CM

## 2022-03-23 DIAGNOSIS — Z87.442 HISTORY OF KIDNEY STONES: ICD-10-CM

## 2022-03-23 DIAGNOSIS — I10 HYPERTENSION, UNSPECIFIED TYPE: Primary | ICD-10-CM

## 2022-03-23 PROCEDURE — G8417 CALC BMI ABV UP PARAM F/U: HCPCS | Performed by: PHYSICIAN ASSISTANT

## 2022-03-23 PROCEDURE — 99214 OFFICE O/P EST MOD 30 MIN: CPT | Performed by: PHYSICIAN ASSISTANT

## 2022-03-23 PROCEDURE — G8427 DOCREV CUR MEDS BY ELIG CLIN: HCPCS | Performed by: PHYSICIAN ASSISTANT

## 2022-03-23 PROCEDURE — 3017F COLORECTAL CA SCREEN DOC REV: CPT | Performed by: PHYSICIAN ASSISTANT

## 2022-03-23 PROCEDURE — G8484 FLU IMMUNIZE NO ADMIN: HCPCS | Performed by: PHYSICIAN ASSISTANT

## 2022-03-23 PROCEDURE — 4004F PT TOBACCO SCREEN RCVD TLK: CPT | Performed by: PHYSICIAN ASSISTANT

## 2022-03-23 ASSESSMENT — PATIENT HEALTH QUESTIONNAIRE - PHQ9
8. MOVING OR SPEAKING SO SLOWLY THAT OTHER PEOPLE COULD HAVE NOTICED. OR THE OPPOSITE, BEING SO FIGETY OR RESTLESS THAT YOU HAVE BEEN MOVING AROUND A LOT MORE THAN USUAL: 0
5. POOR APPETITE OR OVEREATING: 0
4. FEELING TIRED OR HAVING LITTLE ENERGY: 0
2. FEELING DOWN, DEPRESSED OR HOPELESS: 0
SUM OF ALL RESPONSES TO PHQ QUESTIONS 1-9: 0
9. THOUGHTS THAT YOU WOULD BE BETTER OFF DEAD, OR OF HURTING YOURSELF: 0
SUM OF ALL RESPONSES TO PHQ QUESTIONS 1-9: 0
3. TROUBLE FALLING OR STAYING ASLEEP: 0
1. LITTLE INTEREST OR PLEASURE IN DOING THINGS: 0
10. IF YOU CHECKED OFF ANY PROBLEMS, HOW DIFFICULT HAVE THESE PROBLEMS MADE IT FOR YOU TO DO YOUR WORK, TAKE CARE OF THINGS AT HOME, OR GET ALONG WITH OTHER PEOPLE: 0
6. FEELING BAD ABOUT YOURSELF - OR THAT YOU ARE A FAILURE OR HAVE LET YOURSELF OR YOUR FAMILY DOWN: 0
7. TROUBLE CONCENTRATING ON THINGS, SUCH AS READING THE NEWSPAPER OR WATCHING TELEVISION: 0
SUM OF ALL RESPONSES TO PHQ9 QUESTIONS 1 & 2: 0

## 2022-03-23 ASSESSMENT — COLUMBIA-SUICIDE SEVERITY RATING SCALE - C-SSRS
1. WITHIN THE PAST MONTH, HAVE YOU WISHED YOU WERE DEAD OR WISHED YOU COULD GO TO SLEEP AND NOT WAKE UP?: NO
2. HAVE YOU ACTUALLY HAD ANY THOUGHTS OF KILLING YOURSELF?: NO
6. HAVE YOU EVER DONE ANYTHING, STARTED TO DO ANYTHING, OR PREPARED TO DO ANYTHING TO END YOUR LIFE?: NO

## 2022-03-23 NOTE — PROGRESS NOTES
[facility] EXAMINATION         Date of Exam: 3/23/2022         CC:  Chief Complaint   Patient presents with    New Patient     Pt is here to establish care, he would like to discuss his blood pressure           HPI: Samira Kim 1970 is a 45 y.o. male NEW to provider. Has not seen anyone in years. 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 HTN-  Was seen in past by Dr Melquiades Nickerson. States he was on Norvasc but taken off. COVID : Dec 2020. Booster due next week. Lumbar stenosis- lives in pain . Fatty Liver- diagnosed years ago. BIpolar + anx /depression-stable on meds. Does not have severe cycles. PREVENTIVE HEALTH:   Last eye exam:  years  Hearing concerns: Yes cant hear like he used to most likely from loud noise with bands. Last Dental exam:    not for years. Has many dental caries, some teeth pulled. Caffeine use:  1/ day  Exercise:  Very little due to his degenerative changes. Walks daily   Diet:  Decreased calories with 10 lb wt loss this month. Alcohol use:   3 beers per week  Tobacco/ Vapping use/ MJ:    smokes 0.5 ppd since age 24. Ready to quit. Mental Health;  bipolar diagnosed years ago. Has not taken meds for years. Colonoscopy:  Last colonoscopy was [facility] 10 yrs ago. Perform monthly routine skin checks? No  Perform montly self-testicular exams? No  Prostate symptoms/ PSA:   Was told he had a swollen prostate due to urinary symptoms. Living will: no,              REVIEW OF SYSTEMS:  Pertinent positive and negatives are in HPI. Remaining 14 ROS were reviewed and are unremarkable for other constitutional, EENT, cardiac, pulmonary, GI, , neurologic, musculoskeletal, or integumentary complaints.       PAST MEDICAL HISTORY:      Diagnosis Date    Anxiety/depression     Chronic back  and neck pain     Degenerative disc disease     Diverticulosis     Hypertension     Obesity     Scoliosis Past Surgical History:   Procedure Laterality Date    KIDNEY STONE SURGERY         Social and Family History: reviewed. ALLERGIES:    Ciprofloxacin    MEDICATIONS:  Current Outpatient Medications   Medication Sig Dispense Refill    lisinopril-hydroCHLOROthiazide (PRINZIDE;ZESTORETIC) 20-25 MG per tablet TAKE 1 TABLET BY MOUTH EVERY DAY 90 tablet 1    aspirin 81 MG chewable tablet Take 1 tablet by mouth daily. 30 tablet 3     No current facility-administered medications for this visit. PHYSICAL EXAM:   Vitals:    03/23/22 1329   BP: 126/80   Site: Right Upper Arm   Position: Sitting   Pulse: 92   Resp: 16   Temp: 96.3 °F (35.7 °C)   TempSrc: Temporal   SpO2: 96%   Weight: 287 lb 6.4 oz (130.4 kg)   Height: 6' (1.829 m)      Body mass index is 38.98 kg/m². GENERAL APPEARANCE:  Well-nourished. No distress. HEENT: Normocephalic. Atraumatic. EYES: Vision intact. EOMI, PERRLA. Conjunctivae pink and moist. Sclera white. Cornea and lens clear. EARS: Auricles symmetrical without lesions or deformities. Canals are clear without erythema. TM's intact bilaterally. Hearing  intact. NOSE: patent and clear. MOUTH: moist. Teeth intact   Throat clear. NECK: No lymphadenopathy. Thyroid smooth, not enlarged. LUNGS: Clear to auscultation. No wheezes, rales, or rhonchi. Equal resonance to chest percussion. CHEST/SPINE: No skin changes or chest wall deformities. No CVAT. No spine or paraspinal muscle tenderness or deformities. Full range of motion in all planes. HEART:  Regular rate and rhythm. No murmurs, rubs, or gallops. VASCULAR:  No jugular venous distension or carotid bruits. Pulses equal and symmetrical upper and lower extremities. Capillary refill <3secs. ABDOMEN: Truncal obesity. Without scars. Soft, non-tender, Mobile mass RUQ  That moved and upon repeat palpation, it was absent. Normoactive bowel sounds. No pulsatile masses or hepatosplenomegaly.   GENITAL / RECTAL EXAM: Deferred. EXTREMITIES/BACK: No new skin or bony deformities. Symmetrical strength. Pain elicited with back movements. range of motion without eliciting pain. Sensation  and DTR's are equal and intact. NEUROLOGIC: Grossly non focal. Cranial Nerves II-XII intact. Negative Rhomberg. SKIN: Warm, dry, normal skin turgor. No rashes, petechia, purpura. No suspicious lesions. No nail clubbing or cyanosis. PSYCHIATRIC:  Answers and understands questions appropriately. Normal affect, behavior, and mood. ADDITIONAL DATA:  Prior clinic notes, labs and imaging reviewed. Lipid panel:   Lab Results   Component Value Date    TRIG 131 06/05/2020    HDL 37 06/05/2020    [facility] 87 06/05/2020        ASSESSMENT & PLAN:       Hypertension,    - Blood pressure stable today. Continue current medication. Fatty Liver  -Having some right upper quadrant abdominal pain for over a year. Further work-up is recommended. 7/2021    History of kidney stones  -No recent exacerbations. Stones and kidney per last CT    Morbid obesity ([facility] [address] BMI-39  Actively working on dietary changes. Lost 10 pounds last month. Back pain with radiation  -Spinal stenosis, controlled with over-the-counter meds. Current tobacco use  -Half a pack a day since age 24. Ready to stop. Bipolar disorder in full remission,   -Stable without meds for years. Follow up:  Izabela Patrick was seen today for new patient. He was advised that he would be better served being followed by Dr Mel Martínez and residency clinic. Patient understands and agrees with plan. All questions were answered.

## 2022-04-05 ENCOUNTER — HOSPITAL ENCOUNTER (OUTPATIENT)
Age: 52
Discharge: HOME | End: 2022-04-05
Payer: COMMERCIAL

## 2022-04-05 ENCOUNTER — OFFICE VISIT (OUTPATIENT)
Dept: PRIMARY CARE CLINIC | Age: 52
End: 2022-04-05
Payer: COMMERCIAL

## 2022-04-05 VITALS
TEMPERATURE: 98 F | OXYGEN SATURATION: 95 % | HEIGHT: 72 IN | SYSTOLIC BLOOD PRESSURE: 122 MMHG | HEART RATE: 93 BPM | WEIGHT: 284.2 LBS | BODY MASS INDEX: 38.49 KG/M2 | DIASTOLIC BLOOD PRESSURE: 78 MMHG

## 2022-04-05 DIAGNOSIS — Z11.59 ENCOUNTER FOR HEPATITIS C SCREENING TEST FOR LOW RISK PATIENT: ICD-10-CM

## 2022-04-05 DIAGNOSIS — I10 PRIMARY HYPERTENSION: ICD-10-CM

## 2022-04-05 DIAGNOSIS — G89.29 CHRONIC BILATERAL LOW BACK PAIN WITHOUT SCIATICA: ICD-10-CM

## 2022-04-05 DIAGNOSIS — F41.9 ANXIETY: ICD-10-CM

## 2022-04-05 DIAGNOSIS — K57.90 DIVERTICULOSIS: ICD-10-CM

## 2022-04-05 DIAGNOSIS — Z13.220 SCREENING FOR HYPERLIPIDEMIA: ICD-10-CM

## 2022-04-05 DIAGNOSIS — Z13.1 SCREENING FOR DIABETES MELLITUS: ICD-10-CM

## 2022-04-05 DIAGNOSIS — Z87.442 H/O RENAL CALCULI: ICD-10-CM

## 2022-04-05 DIAGNOSIS — Z11.4 SCREENING FOR HIV (HUMAN IMMUNODEFICIENCY VIRUS): ICD-10-CM

## 2022-04-05 DIAGNOSIS — M54.50 CHRONIC BILATERAL LOW BACK PAIN WITHOUT SCIATICA: ICD-10-CM

## 2022-04-05 DIAGNOSIS — F17.200 TOBACCO DEPENDENCE: ICD-10-CM

## 2022-04-05 DIAGNOSIS — F33.42 RECURRENT MAJOR DEPRESSIVE DISORDER, IN FULL REMISSION (HCC): ICD-10-CM

## 2022-04-05 DIAGNOSIS — Z12.11 COLON CANCER SCREENING: ICD-10-CM

## 2022-04-05 DIAGNOSIS — Z13.220 SCREENING FOR HYPERLIPIDEMIA: Primary | ICD-10-CM

## 2022-04-05 DIAGNOSIS — E66.01 CLASS 2 SEVERE OBESITY DUE TO EXCESS CALORIES WITH SERIOUS COMORBIDITY AND BODY MASS INDEX (BMI) OF 38.0 TO 38.9 IN ADULT (HCC): ICD-10-CM

## 2022-04-05 PROBLEM — K76.0 FATTY LIVER: Status: ACTIVE | Noted: 2022-04-05

## 2022-04-05 LAB
ANION GAP SERPL CALCULATED.3IONS-SCNC: 16 MMOL/L (ref 3–16)
BUN BLDV-MCNC: 16 MG/DL (ref 7–20)
CALCIUM SERPL-MCNC: 9.6 MG/DL (ref 8.3–10.6)
CHLORIDE BLD-SCNC: 99 MMOL/L (ref 99–110)
CHOLESTEROL, TOTAL: 161 MG/DL (ref 0–199)
CO2: 24 MMOL/L (ref 21–32)
CREAT SERPL-MCNC: 0.9 MG/DL (ref 0.9–1.3)
GFR AFRICAN AMERICAN: >60
GFR NON-AFRICAN AMERICAN: >60
GLUCOSE BLD-MCNC: 109 MG/DL (ref 70–99)
HDLC SERPL-MCNC: 43 MG/DL (ref 40–60)
HEPATITIS C ANTIBODY INTERPRETATION: NORMAL
LDL CHOLESTEROL CALCULATED: 100 MG/DL
POTASSIUM SERPL-SCNC: 4.6 MMOL/L (ref 3.5–5.1)
SODIUM BLD-SCNC: 139 MMOL/L (ref 136–145)
TRIGL SERPL-MCNC: 92 MG/DL (ref 0–150)
VLDLC SERPL CALC-MCNC: 18 MG/DL

## 2022-04-05 PROCEDURE — 86701 HIV-1ANTIBODY: CPT

## 2022-04-05 PROCEDURE — 4004F PT TOBACCO SCREEN RCVD TLK: CPT | Performed by: FAMILY MEDICINE

## 2022-04-05 PROCEDURE — G8417 CALC BMI ABV UP PARAM F/U: HCPCS | Performed by: FAMILY MEDICINE

## 2022-04-05 PROCEDURE — 99214 OFFICE O/P EST MOD 30 MIN: CPT | Performed by: FAMILY MEDICINE

## 2022-04-05 PROCEDURE — 83036 HEMOGLOBIN GLYCOSYLATED A1C: CPT

## 2022-04-05 PROCEDURE — 86702 HIV-2 ANTIBODY: CPT

## 2022-04-05 PROCEDURE — 87390 HIV-1 AG IA: CPT

## 2022-04-05 PROCEDURE — G8427 DOCREV CUR MEDS BY ELIG CLIN: HCPCS | Performed by: FAMILY MEDICINE

## 2022-04-05 PROCEDURE — 80048 BASIC METABOLIC PNL TOTAL CA: CPT

## 2022-04-05 PROCEDURE — 3017F COLORECTAL CA SCREEN DOC REV: CPT | Performed by: FAMILY MEDICINE

## 2022-04-05 PROCEDURE — 80061 LIPID PANEL: CPT

## 2022-04-05 PROCEDURE — 86803 HEPATITIS C AB TEST: CPT

## 2022-04-05 PROCEDURE — 36415 COLL VENOUS BLD VENIPUNCTURE: CPT

## 2022-04-05 RX ORDER — NICOTINE 21 MG/24HR
1 PATCH, TRANSDERMAL 24 HOURS TRANSDERMAL EVERY 24 HOURS
Qty: 30 PATCH | Refills: 1 | Status: SHIPPED | OUTPATIENT
Start: 2022-04-05 | End: 2022-06-08

## 2022-04-05 RX ORDER — LISINOPRIL AND HYDROCHLOROTHIAZIDE 25; 20 MG/1; MG/1
TABLET ORAL
Qty: 90 TABLET | Refills: 1 | Status: SHIPPED | OUTPATIENT
Start: 2022-04-05

## 2022-04-05 SDOH — ECONOMIC STABILITY: FOOD INSECURITY: WITHIN THE PAST 12 MONTHS, YOU WORRIED THAT YOUR FOOD WOULD RUN OUT BEFORE YOU GOT MONEY TO BUY MORE.: NEVER TRUE

## 2022-04-05 SDOH — ECONOMIC STABILITY: FOOD INSECURITY: WITHIN THE PAST 12 MONTHS, THE FOOD YOU BOUGHT JUST DIDN'T LAST AND YOU DIDN'T HAVE MONEY TO GET MORE.: NEVER TRUE

## 2022-04-05 ASSESSMENT — PATIENT HEALTH QUESTIONNAIRE - PHQ9
SUM OF ALL RESPONSES TO PHQ QUESTIONS 1-9: 3
SUM OF ALL RESPONSES TO PHQ QUESTIONS 1-9: 3
10. IF YOU CHECKED OFF ANY PROBLEMS, HOW DIFFICULT HAVE THESE PROBLEMS MADE IT FOR YOU TO DO YOUR WORK, TAKE CARE OF THINGS AT HOME, OR GET ALONG WITH OTHER PEOPLE: 0
8. MOVING OR SPEAKING SO SLOWLY THAT OTHER PEOPLE COULD HAVE NOTICED. OR THE OPPOSITE, BEING SO FIGETY OR RESTLESS THAT YOU HAVE BEEN MOVING AROUND A LOT MORE THAN USUAL: 0
SUM OF ALL RESPONSES TO PHQ QUESTIONS 1-9: 3
5. POOR APPETITE OR OVEREATING: 1
7. TROUBLE CONCENTRATING ON THINGS, SUCH AS READING THE NEWSPAPER OR WATCHING TELEVISION: 0
1. LITTLE INTEREST OR PLEASURE IN DOING THINGS: 0
2. FEELING DOWN, DEPRESSED OR HOPELESS: 1
SUM OF ALL RESPONSES TO PHQ9 QUESTIONS 1 & 2: 1
4. FEELING TIRED OR HAVING LITTLE ENERGY: 0
3. TROUBLE FALLING OR STAYING ASLEEP: 1
6. FEELING BAD ABOUT YOURSELF - OR THAT YOU ARE A FAILURE OR HAVE LET YOURSELF OR YOUR FAMILY DOWN: 0
9. THOUGHTS THAT YOU WOULD BE BETTER OFF DEAD, OR OF HURTING YOURSELF: 0
SUM OF ALL RESPONSES TO PHQ QUESTIONS 1-9: 3

## 2022-04-05 ASSESSMENT — ANXIETY QUESTIONNAIRES
3. WORRYING TOO MUCH ABOUT DIFFERENT THINGS: 2
7. FEELING AFRAID AS IF SOMETHING AWFUL MIGHT HAPPEN: 1
1. FEELING NERVOUS, ANXIOUS, OR ON EDGE: 0
6. BECOMING EASILY ANNOYED OR IRRITABLE: 0
5. BEING SO RESTLESS THAT IT IS HARD TO SIT STILL: 0
4. TROUBLE RELAXING: 1
2. NOT BEING ABLE TO STOP OR CONTROL WORRYING: 1
IF YOU CHECKED OFF ANY PROBLEMS ON THIS QUESTIONNAIRE, HOW DIFFICULT HAVE THESE PROBLEMS MADE IT FOR YOU TO DO YOUR WORK, TAKE CARE OF THINGS AT HOME, OR GET ALONG WITH OTHER PEOPLE: NOT DIFFICULT AT ALL
GAD7 TOTAL SCORE: 5

## 2022-04-05 ASSESSMENT — SOCIAL DETERMINANTS OF HEALTH (SDOH)
HOW HARD IS IT FOR YOU TO PAY FOR THE VERY BASICS LIKE FOOD, HOUSING, MEDICAL CARE, AND HEATING?: NOT HARD AT ALL
HOW HARD IS IT FOR YOU TO PAY FOR THE VERY BASICS LIKE FOOD, HOUSING, MEDICAL CARE, AND HEATING?: NOT HARD AT ALL

## 2022-04-05 NOTE — PATIENT INSTRUCTIONS
Get Tdap, pneumovax,  and Shingrix vaccines at [address], [address]  Ph: [phone]

## 2022-04-05 NOTE — PROGRESS NOTES
PROGRESS NOTE     Lloyd Meadows MD  [address] [facility]                                  [address], [address], [address] [facility] [address]         Phone: [phone]    Date of Service:  4/5/2022     Patient ID: Yareli Pate is a 45 y.o. male      Assessment / Plan:     1. Primary hypertension  Well-controlled. patient to continue lisinopril-thiazide 20/25 mg. Checking the following:  - Basic Metabolic Panel; Future    2. Anxiety/. Recurrent major depressive disorder, in full remission ([address]  Currently in remission without medicine. Discussed symptoms of true vin or hypomania, and patient declines ever having either of these. For this reason we will need the diagnosis of bipolar office chart for now. Patient was given this diagnosis by a prior PCP. He does admit to having irritability when he stressed. Will monitor closely. 3. Class 2 severe obesity due to excess calories with serious comorbidity and body mass index (BMI) of 38.0 to 38.9 in [facility])  Discussed the importance of healthy eating and exercise. He is motivated to make some changes. He knows he is fatty liver, without signs of inflammation at this point in time. Patient is going to start exercise routine along with healthy eating. 4.. Tobacco dependence  As patient could not tolerate Chantix in the past, prescribing NicoDerm 14 mg patches, as he got 1/2 pack/day. Patient states he is motivated to quit. Reviewed side effects and expected course. Told patient to let me know when he is ready to go down to the 7 mg patch.     F/u in 3 months to see how weight loss and tobacco cessation are going      HM:  Get Tdap, shingrix, pneumovax and moderna booster at pharmacy (as medicaid)    Referring to GI for colonoscopy and gave number to schedule    Checking FLP, A1c, Hep C and HIV screens      Subjective:     CC: Hypertension, anxiety depression, obesity, tobacco dependence    HPI    44 yo WM here to establish care and receive treatment of the above chronic medical conditions. Patient states he tried to establish care with a physician assistant at a local primary care office, but was told that he needed to be under the care of a physician. Patient has been using emergency room for primary care the last couple of years because he had trouble finding doctors that was taking care of source insurance. Since recent emergency room visit last month for elevated blood pressure, he has been on lisinopril hydrochlorothiazide 20/20 5 mg. He denies any side effects. He has been trying to eat less salt, eat healthier in general, and exercise more. Patient states he was told by PA that he had fatty liver, and he was relieved by that this meant he was going to die. He has not had any recent abdominal pain, change in color of his urine or stools, unintentional weight loss, abdominal bloating. He drinks about 6 alcoholic drinks a month. Patient is smoked for 25 years. The most he smoked in a day was 1 pack/day. He is currently down to half a pack per day. He tried Chantix in the past but it caused horrible nightmares. States he is very motivated to quit smoking. Lastly, patient was told by remote PCP that he has bipolar disorder. He states he has had episodes of depression. When reviewing symptoms of vin or hypomania, he denies ever having any of the symptoms he states he does have some irritability now and then when he is stressed, does not feel like it has been significant today he denies any symptoms of uncontrolled depression or anxiety. lexiscan stress test 6/4/20      Summary    Normal myocardial perfusion study with normal left ventricular function,    size, and wall motion.    The estimated left ventricular function is 73%.      As patient is new, reviewed allergies, medicines, past medical history, surgical history, family history, and social history. ROS:    Constitutional:  Negative for activity or appetite change, fever or fatigue  HENT:  Negative for congestion, sinus pressure, or rhinorrhea  Eyes:  Negative for eye pain or visual changes  Resp:  Negative for SOB, chest tightness, cough  Cardiovascular: Negative for CP, palpitations, NEVES, orthopnea, PND, LE edema  Gastrointestinal: Negative for abd pain, melena, BRBPR, N/V/D  Endocrine:  Negative for polydipsia and polyuria  :  Negative for dysuria, flank pain or urinary frequency  Musculoskeletal:  Negative for  Myalgias, +some chronic intermittent back pain  Neuro:  Negative for dizziness or lightheadedness  Psych: negative for depression or anxiety      Vitals:    04/05/22 1102   BP: 122/78   Pulse: 93   Temp: 98 °F (36.7 °C)   TempSrc: Temporal   SpO2: 95%   Weight: 284 lb 3.2 oz (128.9 kg)   Height: 6' (1.829 m)       Outpatient Medications Marked as Taking for the 4/5/22 encounter (Office Visit) with Irma Rosado MD   Medication Sig Dispense Refill    lisinopril-hydroCHLOROthiazide (PRINZIDE;ZESTORETIC) 20-25 MG per tablet TAKE 1 TABLET BY MOUTH EVERY DAY 90 tablet 1    nicotine (NICODERM CQ) 14 MG/24HR Place 1 patch onto the skin every 24 hours 30 patch 1    aspirin 81 MG chewable tablet Take 1 tablet by mouth daily.  30 tablet 3             Objective:   Constitutional:   · Reviewed vitals above  · Well Nourished, well developed, no distress       HENT:  · Normal external nose without lesions  · Bilateral TMs translucent with normal light reflex and bony landmarks  · Normal oropharynx without erythema or exudate  · Normal nasal mucosa without swelling or erythema  Neck:  · Symmetric and without masses  · No thyromegaly  Resp:  · Normal effort  · Clear to auscultation bilaterally without rhonchi, wheezing or crackles  Cardiovascular:  · On auscultation, normal S1 and S2 without murmurs, rubs or gallops  · No bruits of bilateral carotids and no JVD  Gastrointestinal:  · Nontender, nondistended, and no masses  · No hepatosplenomegaly  Musculoskeletal:  · Normal Gait  · All extremities without clubbing, cyanosis or edema  Skin:  · No rashes on inspection  · No areas of increased heat or induration on palpation  Psych:  · Normal mood and affect  · Normal insight and judgement        EMR Dragon/transcription disclaimer:  Much of this encounter note is electronic transcription/translation of spoken language to printed texts. The electronic translation of spoken language may be erroneous, or at times, nonsensical words or phrases may be inadvertently transcribed.   Although I have reviewed the note for such errors, some may still exist.

## 2022-04-06 LAB
ESTIMATED AVERAGE GLUCOSE: 116.9 MG/DL
HBA1C MFR BLD: 5.7 %
HIV AG/AB: NORMAL
HIV ANTIGEN: NORMAL
HIV-1 ANTIBODY: NORMAL
HIV-2 AB: NORMAL

## 2022-04-07 PROBLEM — R73.03 PREDIABETES: Status: ACTIVE | Noted: 2022-04-07

## 2022-04-13 ENCOUNTER — TELEPHONE (OUTPATIENT)
Dept: PRIMARY CARE CLINIC | Age: 52
End: 2022-04-13

## 2022-04-13 DIAGNOSIS — Z91.89 RISK OF MYOCARDIAL INFARCTION OR STROKE 7.5% OR GREATER IN NEXT 10 YEARS: Primary | ICD-10-CM

## 2022-04-13 RX ORDER — ATORVASTATIN CALCIUM 20 MG/1
20 TABLET, FILM COATED ORAL DAILY
Qty: 90 TABLET | Refills: 3 | Status: SHIPPED | OUTPATIENT
Start: 2022-04-13 | End: 2022-08-03 | Stop reason: SDUPTHER

## 2022-04-13 NOTE — TELEPHONE ENCOUNTER
Patient called back for lab results and would  like to start taking medication to help lower his cholesterol.  Patient would like something called in right away so he can start immediately

## 2022-04-13 NOTE — PROGRESS NOTES
Script promptly sent. Please let pt know he can start ASAFELICIANO Carreno. Bassam Velez., DO  4/13/2022   1. Risk of myocardial infarction or stroke 7.5% or greater in next 10 years  - atorvastatin (LIPITOR) 20 MG tablet; Take 1 tablet by mouth daily  Dispense: 90 tablet;  Refill: 3

## 2022-04-14 NOTE — TELEPHONE ENCOUNTER
Pt called back, wants to discuss results further with Dr. Brittani Montgomery. Scheduled appt for 4/25. Pt is going to begin lipitor. However, he is worried because he has a fatty liver.

## 2022-04-24 PROBLEM — F31.9 BIPOLAR AFFECTIVE DISORDER (HCC): Status: RESOLVED | Noted: 2017-07-12 | Resolved: 2022-04-24

## 2022-04-24 NOTE — PATIENT INSTRUCTIONS
Get Tdap, pneumovax,  and Shingrix vaccines at [address] [place], [address] [address]  Ph: [phone]    Read about the DASH diet    Goal:  150min walking weekly.

## 2022-04-24 NOTE — PROGRESS NOTES
PROGRESS NOTE     Tejal Porras MD  [address] [facility]                                  [address], [address], [address] [place] [address]         Phone: [phone]    Date of Service:  4/25/2022     Patient ID: Stas Cruz is a 45 y.o. male      Assessment / Plan:     1. Primary hypertension  Well-controlled, but pt feeling lightheaded weak since he dramatically changed diet. We did review 8 pounds just in the last 20 days. Well patient still plans to follow a DASH diet, he is hoping did not keep quite so regimented and strictly. He wants to continue the same dose of lisinopril-thiazide 20/25 mg for now, but will MyChart me if orthostatic lightheadedness continues, as we will decrease the dose in half. Patient expressed understanding. 2. Anxiety  Anxiety is not controlled. Had some nausea and fatigue with antidepressants in the past.  Does not recall which ones he tried. We will start Lexapro 5 mg to see if this is well-tolerated reviewed this dosage may not work, but would like to start at a low dose and slowly increase to see if he can tolerate. Patient is clearly very anxious and worried about every small part of his health. We did some more reading is motivational and healthy, but when in excess can make it hard to function day-to-day. He agrees that he is at the.      3. Prediabetes  Discussed A1c is barely an diabetic range. Patient has already lost 8 pounds in the last 20 days. Patient is going to continue to work on healthy eating and exercise    4. Risk of myocardial infarction or stroke 7.5% or greater in next 10 years  Already on statin. Discussed that stopping smoking will help lower this risk as well.   He is going to start nicotine patches    Pt to discuss with his wife and see if she is willing to quit smoking with him          HM:  Get Tdap, shingrix, pneumovax and moderna booster at pharmacy (as medicaid)    Referred to GI for colonoscopy at last visit. Giving number to schedule again. Hep C and HIV screens: negative      Subjective:     CC: prediabetes, elevated 10 year ASCVD score    HPI    44 yo WM here for f/u of blood work. Pt established care 3 weeks ago, and at that time had controlled HTN on meds. He was started on nicoderm patches for his tobacco dep and we est lifestyle goals for pt's fatty liver and obesity. Pt is here today for re-evaluation of lab results. Labs showed pt is prediabetic and elevated 10 year ASCVD risk. Patient is smoked for 25 years. The most he smoked in a day was 1 pack/day. He is currently down to half a pack per day. He tried Chantix in the past but it caused horrible nightmares. He was prescribed nicotine patches at last appointment but has not yet started. In fact he states he has increased his smoking since last appointment because of all the anxiety that occurred after he received his lab results. He states his wife smokes too, and this makes it hard for him to quit, but he thinks his    Patient started Lipitor 20 mg as prescribed. He is tolerating it well without GI symptoms or myalgias. He is very anxious about his elevated 10-year and CVD risk score, his cholesterol, and new diagnosis of prediabetes. His anxiety has increased greatly since the diagnoses, and obsessively worrying about his own mortality. He states he has been very obsessive with his eating, only eating foods, vegetables, and crab meat. He is trying to avoid any cholesterol in his diet. As a result he has lost 8 pounds in the last 20 days. Patient states he has been on antidepressants in the past for his anxiety, but they caused nausea and fatigue. He is not sure exactly which medicines he has been on. Its been quite a while he is willing to try the medicine, due to the severity of his anxiety. Over the last week, patient has frequently had lightheadedness upon standing. He believes this is due to his drastic change in diet. He denies any chest pain, shortness of breath, orthopnea, paroxysmal nocturnal dyspnea or extremity edema, palp patient. The 10-year ASCVD risk score (Jenni Means, et al., 2013) is: 8.4%    Values used to calculate the score:      Age: 45 years      Sex: Male      Is Non- : No      Diabetic: No      Tobacco smoker: Yes      Systolic Blood Pressure: 978 mmHg      Is BP treated: Yes      HDL Cholesterol: 43 mg/dL      Total Cholesterol: 161 mg/dL          Lab Results   Component Value Date    LABA1C 5.7 04/05/2022    LABA1C 5.3 05/11/2021     Lab Results   Component Value Date    LABMICR YES 04/10/2021    CREATININE 0.9 04/05/2022     Lab Results   Component Value Date    ALT 35 02/02/2022    AST 20 02/02/2022     Lab Results   Component Value Date    CHOL 161 04/05/2022    TRIG 92 04/05/2022    HDL 43 04/05/2022    LDLCALC 100 (H) 04/05/2022                  lexiscan stress test 6/4/20      Summary    Normal myocardial perfusion study with normal left ventricular function,    size, and wall motion.    The estimated left ventricular function is 73%. As patient is new, reviewed allergies, medicines, past medical history, surgical history, family history, and social history. ROS:    See hpi      Vitals:    04/25/22 1234   BP: 124/68   Site: Left Upper Arm   Position: Sitting   Cuff Size: Large Adult   Pulse: 97   Resp: 18   Temp: 97.5 °F (36.4 °C)   TempSrc: Temporal   SpO2: 96%   Weight: 276 lb (125.2 kg)       Outpatient Medications Marked as Taking for the 4/25/22 encounter (Office Visit) with Nikki Simon MD   Medication Sig Dispense Refill    atorvastatin (LIPITOR) 20 MG tablet Take 1 tablet by mouth daily 90 tablet 3    lisinopril-hydroCHLOROthiazide (PRINZIDE;ZESTORETIC) 20-25 MG per tablet TAKE 1 TABLET BY MOUTH EVERY DAY 90 tablet 1    aspirin 81 MG chewable tablet Take 1 tablet by mouth daily.  30 tablet 3 Objective:   Constitutional:   · Reviewed vitals above  · Well Nourished, well developed, no distress       Neck:  · Symmetric and without masses  · No thyromegaly  Resp:  · Normal effort  · Clear to auscultation bilaterally without rhonchi, wheezing or crackles  Cardiovascular:  · On auscultation, normal S1 and S2 without murmurs, rubs or gallops  · No bruits of bilateral carotids and no JVD  Gastrointestinal:  · Nontender, nondistended, and no masses  · No hepatosplenomegaly  Musculoskeletal:  · Normal Gait  · All extremities without clubbing, cyanosis or edema  Skin:  · No rashes on inspection  · No areas of increased heat or induration on palpation  Psych:  · Normal mood and affect  · Normal insight and judgement        EMR Dragon/transcription disclaimer:  Much of this encounter note is electronic transcription/translation of spoken language to printed texts. The electronic translation of spoken language may be erroneous, or at times, nonsensical words or phrases may be inadvertently transcribed.   Although I have reviewed the note for such errors, some may still exist.

## 2022-04-25 ENCOUNTER — OFFICE VISIT (OUTPATIENT)
Dept: PRIMARY CARE CLINIC | Age: 52
End: 2022-04-25
Payer: COMMERCIAL

## 2022-04-25 VITALS
SYSTOLIC BLOOD PRESSURE: 124 MMHG | TEMPERATURE: 97.5 F | WEIGHT: 276 LBS | BODY MASS INDEX: 37.43 KG/M2 | OXYGEN SATURATION: 96 % | DIASTOLIC BLOOD PRESSURE: 68 MMHG | HEART RATE: 97 BPM | RESPIRATION RATE: 18 BRPM

## 2022-04-25 DIAGNOSIS — I10 PRIMARY HYPERTENSION: Primary | ICD-10-CM

## 2022-04-25 DIAGNOSIS — F41.1 GENERALIZED ANXIETY DISORDER: ICD-10-CM

## 2022-04-25 DIAGNOSIS — Z91.89 RISK OF MYOCARDIAL INFARCTION OR STROKE 7.5% OR GREATER IN NEXT 10 YEARS: ICD-10-CM

## 2022-04-25 DIAGNOSIS — R73.03 PREDIABETES: ICD-10-CM

## 2022-04-25 PROCEDURE — G8427 DOCREV CUR MEDS BY ELIG CLIN: HCPCS | Performed by: FAMILY MEDICINE

## 2022-04-25 PROCEDURE — 4004F PT TOBACCO SCREEN RCVD TLK: CPT | Performed by: FAMILY MEDICINE

## 2022-04-25 PROCEDURE — 3017F COLORECTAL CA SCREEN DOC REV: CPT | Performed by: FAMILY MEDICINE

## 2022-04-25 PROCEDURE — G8417 CALC BMI ABV UP PARAM F/U: HCPCS | Performed by: FAMILY MEDICINE

## 2022-04-25 PROCEDURE — 99214 OFFICE O/P EST MOD 30 MIN: CPT | Performed by: FAMILY MEDICINE

## 2022-04-25 RX ORDER — ESCITALOPRAM OXALATE 5 MG/1
5 TABLET ORAL DAILY
Qty: 30 TABLET | Refills: 0 | Status: SHIPPED | OUTPATIENT
Start: 2022-04-25 | End: 2022-05-23

## 2022-04-25 ASSESSMENT — ANXIETY QUESTIONNAIRES
1. FEELING NERVOUS, ANXIOUS, OR ON EDGE: 0
7. FEELING AFRAID AS IF SOMETHING AWFUL MIGHT HAPPEN: 1
4. TROUBLE RELAXING: 1
3. WORRYING TOO MUCH ABOUT DIFFERENT THINGS: 0
6. BECOMING EASILY ANNOYED OR IRRITABLE: 0
5. BEING SO RESTLESS THAT IT IS HARD TO SIT STILL: 0
2. NOT BEING ABLE TO STOP OR CONTROL WORRYING: 1
IF YOU CHECKED OFF ANY PROBLEMS ON THIS QUESTIONNAIRE, HOW DIFFICULT HAVE THESE PROBLEMS MADE IT FOR YOU TO DO YOUR WORK, TAKE CARE OF THINGS AT HOME, OR GET ALONG WITH OTHER PEOPLE: NOT DIFFICULT AT ALL
GAD7 TOTAL SCORE: 3

## 2022-04-25 ASSESSMENT — PATIENT HEALTH QUESTIONNAIRE - PHQ9
SUM OF ALL RESPONSES TO PHQ QUESTIONS 1-9: 2
SUM OF ALL RESPONSES TO PHQ QUESTIONS 1-9: 2
3. TROUBLE FALLING OR STAYING ASLEEP: 0
5. POOR APPETITE OR OVEREATING: 0
SUM OF ALL RESPONSES TO PHQ9 QUESTIONS 1 & 2: 1
4. FEELING TIRED OR HAVING LITTLE ENERGY: 1
SUM OF ALL RESPONSES TO PHQ QUESTIONS 1-9: 2
2. FEELING DOWN, DEPRESSED OR HOPELESS: 1
7. TROUBLE CONCENTRATING ON THINGS, SUCH AS READING THE NEWSPAPER OR WATCHING TELEVISION: 0
8. MOVING OR SPEAKING SO SLOWLY THAT OTHER PEOPLE COULD HAVE NOTICED. OR THE OPPOSITE, BEING SO FIGETY OR RESTLESS THAT YOU HAVE BEEN MOVING AROUND A LOT MORE THAN USUAL: 0
SUM OF ALL RESPONSES TO PHQ QUESTIONS 1-9: 2
10. IF YOU CHECKED OFF ANY PROBLEMS, HOW DIFFICULT HAVE THESE PROBLEMS MADE IT FOR YOU TO DO YOUR WORK, TAKE CARE OF THINGS AT HOME, OR GET ALONG WITH OTHER PEOPLE: 0
1. LITTLE INTEREST OR PLEASURE IN DOING THINGS: 0
9. THOUGHTS THAT YOU WOULD BE BETTER OFF DEAD, OR OF HURTING YOURSELF: 0
6. FEELING BAD ABOUT YOURSELF - OR THAT YOU ARE A FAILURE OR HAVE LET YOURSELF OR YOUR FAMILY DOWN: 0

## 2022-05-05 ENCOUNTER — PATIENT MESSAGE (OUTPATIENT)
Dept: PRIMARY CARE CLINIC | Age: 52
End: 2022-05-05

## 2022-05-05 RX ORDER — TAMSULOSIN HYDROCHLORIDE 0.4 MG/1
0.4 CAPSULE ORAL DAILY
Qty: 30 CAPSULE | Refills: 0 | Status: SHIPPED | OUTPATIENT
Start: 2022-05-05 | End: 2022-05-26

## 2022-05-06 NOTE — TELEPHONE ENCOUNTER
I called both home and mobile. No answer, but VM is not full    Perhaps, wrong number was dialed when MA called. I can't find HIPAA form to know if i'm allowed to leave message on machine        Can you please locate HIPAA and call and leave message with patient if HIPAA allows? ?

## 2022-05-06 NOTE — TELEPHONE ENCOUNTER
Pt called back, was informed of your message and agrees with plan. States he is currently able to empty bladder with minimal pain.

## 2022-05-06 NOTE — TELEPHONE ENCOUNTER
Please reach patient via telephone and check on him    If he isn't urinating and/or is still in pain, he MUST go to the ER    Document and route back

## 2022-05-07 ENCOUNTER — HOSPITAL ENCOUNTER (EMERGENCY)
Age: 52
Discharge: HOME | End: 2022-05-07
Attending: STUDENT IN AN ORGANIZED HEALTH CARE EDUCATION/TRAINING PROGRAM
Payer: COMMERCIAL

## 2022-05-07 ENCOUNTER — APPOINTMENT (OUTPATIENT)
Dept: CT IMAGING | Age: 52
End: 2022-05-07
Payer: COMMERCIAL

## 2022-05-07 VITALS
BODY MASS INDEX: 37.11 KG/M2 | WEIGHT: 274 LBS | OXYGEN SATURATION: 96 % | TEMPERATURE: 98 F | RESPIRATION RATE: 16 BRPM | HEART RATE: 76 BPM | DIASTOLIC BLOOD PRESSURE: 80 MMHG | HEIGHT: 72 IN | SYSTOLIC BLOOD PRESSURE: 122 MMHG

## 2022-05-07 DIAGNOSIS — R33.8 ACUTE URINARY RETENTION: Primary | ICD-10-CM

## 2022-05-07 DIAGNOSIS — N28.1 RENAL CYST, ACQUIRED: ICD-10-CM

## 2022-05-07 DIAGNOSIS — R31.9 HEMATURIA, UNSPECIFIED TYPE: ICD-10-CM

## 2022-05-07 LAB
A/G RATIO: 2.3 (ref 1.1–2.2)
ALBUMIN SERPL-MCNC: 4.8 G/DL (ref 3.4–5)
ALP BLD-CCNC: 111 U/L (ref 40–129)
ALT SERPL-CCNC: 27 U/L (ref 10–40)
ANION GAP SERPL CALCULATED.3IONS-SCNC: 13 MMOL/L (ref 3–16)
AST SERPL-CCNC: 27 U/L (ref 15–37)
BACTERIA: ABNORMAL /HPF
BASOPHILS ABSOLUTE: 0.1 K/UL (ref 0–0.2)
BASOPHILS RELATIVE PERCENT: 0.6 %
BILIRUB SERPL-MCNC: 0.3 MG/DL (ref 0–1)
BILIRUBIN URINE: NEGATIVE
BLOOD, URINE: ABNORMAL
BUN BLDV-MCNC: 16 MG/DL (ref 7–20)
CALCIUM SERPL-MCNC: 10.5 MG/DL (ref 8.3–10.6)
CHLORIDE BLD-SCNC: 99 MMOL/L (ref 99–110)
CLARITY: CLEAR
CO2: 23 MMOL/L (ref 21–32)
COLOR: YELLOW
CREAT SERPL-MCNC: 1 MG/DL (ref 0.9–1.3)
CRYSTALS, UA: ABNORMAL /HPF
EOSINOPHILS ABSOLUTE: 0.4 K/UL (ref 0–0.6)
EOSINOPHILS RELATIVE PERCENT: 3.9 %
EPITHELIAL CELLS, UA: ABNORMAL /HPF (ref 0–5)
GFR AFRICAN AMERICAN: >60
GFR NON-AFRICAN AMERICAN: >60
GLUCOSE BLD-MCNC: 153 MG/DL (ref 70–99)
GLUCOSE URINE: NEGATIVE MG/DL
HCT VFR BLD CALC: 41 % (ref 40.5–52.5)
HEMOGLOBIN: 14.2 G/DL (ref 13.5–17.5)
KETONES, URINE: NEGATIVE MG/DL
LEUKOCYTE ESTERASE, URINE: NEGATIVE
LYMPHOCYTES ABSOLUTE: 2.8 K/UL (ref 1–5.1)
LYMPHOCYTES RELATIVE PERCENT: 29.4 %
MCH RBC QN AUTO: 33.7 PG (ref 26–34)
MCHC RBC AUTO-ENTMCNC: 34.6 G/DL (ref 31–36)
MCV RBC AUTO: 97.6 FL (ref 80–100)
MICROSCOPIC EXAMINATION: YES
MONOCYTES ABSOLUTE: 0.6 K/UL (ref 0–1.3)
MONOCYTES RELATIVE PERCENT: 6 %
NEUTROPHILS ABSOLUTE: 5.8 K/UL (ref 1.7–7.7)
NEUTROPHILS RELATIVE PERCENT: 60.1 %
NITRITE, URINE: NEGATIVE
PDW BLD-RTO: 13.2 % (ref 12.4–15.4)
PH UA: 6 (ref 5–8)
PLATELET # BLD: 271 K/UL (ref 135–450)
PMV BLD AUTO: 6.7 FL (ref 5–10.5)
POTASSIUM REFLEX MAGNESIUM: 3.9 MMOL/L (ref 3.5–5.1)
PROTEIN UA: NEGATIVE MG/DL
RBC # BLD: 4.21 M/UL (ref 4.2–5.9)
RBC UA: ABNORMAL /HPF (ref 0–4)
SODIUM BLD-SCNC: 135 MMOL/L (ref 136–145)
SPECIFIC GRAVITY UA: >=1.03 (ref 1–1.03)
TOTAL PROTEIN: 6.9 G/DL (ref 6.4–8.2)
URINE REFLEX TO CULTURE: ABNORMAL
URINE TYPE: ABNORMAL
UROBILINOGEN, URINE: 0.2 E.U./DL
WBC # BLD: 9.7 K/UL (ref 4–11)
WBC UA: ABNORMAL /HPF (ref 0–5)

## 2022-05-07 PROCEDURE — 99284 EMERGENCY DEPT VISIT MOD MDM: CPT

## 2022-05-07 PROCEDURE — 2580000003 HC RX 258: Performed by: STUDENT IN AN ORGANIZED HEALTH CARE EDUCATION/TRAINING PROGRAM

## 2022-05-07 PROCEDURE — 81001 URINALYSIS AUTO W/SCOPE: CPT

## 2022-05-07 PROCEDURE — 51798 US URINE CAPACITY MEASURE: CPT

## 2022-05-07 PROCEDURE — 85025 COMPLETE CBC W/AUTO DIFF WBC: CPT

## 2022-05-07 PROCEDURE — 74176 CT ABD & PELVIS W/O CONTRAST: CPT

## 2022-05-07 PROCEDURE — 80053 COMPREHEN METABOLIC PANEL: CPT

## 2022-05-07 RX ORDER — 0.9 % SODIUM CHLORIDE 0.9 %
1000 INTRAVENOUS SOLUTION INTRAVENOUS ONCE
Status: COMPLETED | OUTPATIENT
Start: 2022-05-07 | End: 2022-05-07

## 2022-05-07 RX ADMIN — SODIUM CHLORIDE 1000 ML: 9 INJECTION, SOLUTION INTRAVENOUS at 05:28

## 2022-05-07 ASSESSMENT — PAIN - FUNCTIONAL ASSESSMENT: PAIN_FUNCTIONAL_ASSESSMENT: 0-10

## 2022-05-07 ASSESSMENT — PAIN SCALES - GENERAL: PAINLEVEL_OUTOF10: 6

## 2022-05-07 NOTE — Clinical Note
Ella Michaels was seen and treated in our emergency department on 5/7/2022. He may return to work on 05/08/2022. If you have any questions or concerns, please don't hesitate to call.       Severiano Poncho, MD

## 2022-05-07 NOTE — ED NOTES
IVF not infusing. Liter still hanging. PIV checked. Angio not completely threaded into skin. Line will not flush. PIV removed. Angio cath bent. Dry dressing placed at site. Pt did not receive any of the IVF. Dr Chichi Valverde aware and states okay to not restart PIV because pt was able to urinate. Primary nurse, Nuno Srivastava, notified.       Chago Diaz RN  05/07/22 [id number]

## 2022-05-07 NOTE — Clinical Note
Mimi [place] was seen and treated in our emergency department on 2022. He may return to work on 2022. If you have any questions or concerns, please don't hesitate to call.       Maria C Flanagan MD

## 2022-05-07 NOTE — ED PROVIDER NOTES
[facility]  ED      CHIEF COMPLAINT  Urinary Retention       HISTORY OF PRESENT ILLNESS  Terrell Rosales is a 45 y.o. male with a past medical history of HTN, kidney stone, obesity, prediabetes, who presents to the ED complaining of urinary retention. Onset: 5/5  Reported some difficulty with urinary tension. He started Flomax on 5/6 with a significant improvement. He reports he has not been able to urinate since last night. He also reports that when he had sexual activity around 330, he ejaculated but has no semen exit his urethra. Testicular Swelling: Denies  Testicular Discoloration: Denies  Dysuria: Denies  Fever: Denies  Abdominal Pain: He reports some mild pressure, but denies true abdominal pain, denies flank pain  No palliative or provocative factors. Patient denies any concern for STD    Old records reviewed: No pertinent information noted. No other complaints, modifying factors or associated symptoms. I have reviewed the following from the nursing documentation.     Past Medical History:   Diagnosis Date    Anxiety     Chronic back  and neck pain     Degenerative disc disease     Diabetes mellitus ([address]     prediabetic    Diverticulosis     Fatty liver     H/O renal calculi     Hyperlipidemia     Hypertension     MDD (major depressive disorder)     Obesity     Prediabetes     Scoliosis     Tobacco dependence      Past Surgical History:   Procedure Laterality Date    KIDNEY STONE SURGERY       Family History   Problem Relation Age of Onset    Heart Disease Mother     Arrhythmia Mother     Diabetes Mother     Leukemia Father     Diabetes Maternal Grandmother     No Known Problems Paternal Grandmother     No Known Problems Paternal Grandfather      Social History     Socioeconomic History    Marital status:      Spouse name: Alexx Judd Number of children: 3    Years of education: Not on file    Highest education level: Not on file   Occupational History    Occupation: bar mgr   Tobacco Use    Smoking status: Current Every Day Smoker     Packs/day: 0.50     Years: 25.00     Pack years: 12.50     Types: Cigarettes    Smokeless tobacco: Never Used   Vaping Use    Vaping Use: Never used   Substance and Sexual Activity    Alcohol use: Yes     Comment: 3 drinks every other weekend    Drug use: No    Sexual activity: Yes     Partners: Female   Other Topics Concern    Not on file   Social History Narrative    Not on file     Social Determinants of Health     Financial Resource Strain: Low Risk     Difficulty of Paying Living Expenses: Not hard at all   Food Insecurity: No Food Insecurity    Worried About Running Out of Food in the Last Year: Never true    Amanda of Food in the Last Year: Never true   Transportation Needs:     Lack of Transportation (Medical): Not on file    Lack of Transportation (Non-Medical):  Not on file   Physical Activity:     Days of Exercise per Week: Not on file    Minutes of Exercise per Session: Not on file   Stress:     Feeling of Stress : Not on file   Social Connections:     Frequency of Communication with Friends and Family: Not on file    Frequency of Social Gatherings with Friends and Family: Not on file    Attends Jainism Services: Not on file    Active Member of [address] [place] [address] [employer] or Organizations: Not on file    Attends Club or Organization Meetings: Not on file    Marital Status: Not on file   Intimate Partner Violence:     Fear of Current or Ex-Partner: Not on file    Emotionally Abused: Not on file    Physically Abused: Not on file    Sexually Abused: Not on file   Housing Stability:     Unable to Pay for Housing in the Last Year: Not on file    Number of [place] in the Last Year: Not on file    Unstable Housing in the Last Year: Not on file     Current Facility-Administered Medications   Medication Dose Route Frequency Provider Last Rate Last Admin    0.9 % sodium chloride bolus  1,000 mL IntraVENous Once Daniel Payne MD 1,935.5 mL/hr at 05/07/22 0528 1,000 mL at 05/07/22 0528     Current Outpatient Medications   Medication Sig Dispense Refill    tamsulosin (FLOMAX) 0.4 MG capsule Take 1 capsule by mouth daily 30 capsule 0    escitalopram (LEXAPRO) 5 MG tablet Take 1 tablet by mouth daily 30 tablet 0    atorvastatin (LIPITOR) 20 MG tablet Take 1 tablet by mouth daily 90 tablet 3    lisinopril-hydroCHLOROthiazide (PRINZIDE;ZESTORETIC) 20-25 MG per tablet TAKE 1 TABLET BY MOUTH EVERY DAY 90 tablet 1    nicotine (NICODERM CQ) 14 MG/24HR Place 1 patch onto the skin every 24 hours (Patient not taking: Reported on 4/25/2022) 30 patch 1    aspirin 81 MG chewable tablet Take 1 tablet by mouth daily. 30 tablet 3     Allergies   Allergen Reactions    Ciprofloxacin Itching       REVIEW OF SYSTEMS  All systems reviewed, pertinent positives per HPI otherwise noted to be negative. PHYSICAL EXAM  BP (!) 154/87   Pulse 107   Temp 98.6 °F (37 °C) (Oral)   Resp 18   Ht 6' (1.829 m)   Wt 274 lb (124.3 kg)   SpO2 99%   BMI 37.16 kg/m²    GENERAL APPEARANCE: Awake and alert. Cooperative. no distress. HENT: Normocephalic. Atraumatic. Mucous membranes are moist  NECK: Supple. Full range of motion of the neck without stiffness or pain. EYES: PERRL. EOM's grossly intact. HEART/CHEST: RRR. No murmurs. Chest wall is not tender to palpation. LUNGS: Respirations unlabored. CTAB. Good air exchange. Speaking comfortably in full sentences. ABDOMEN: No tenderness. Soft. Non-distended. No masses. No organomegaly. No guarding or rebound. No CVA tenderness. GENITOURINARY:  Penile lesions are absent. There is no urethral discharge or bleeding. There is no penile erythema, edema, or deformity. There is no scrotal erythema, edema, masses, or tenderness. Inguinal hernias are absent. Perineal crepitus, ecchymoses, erythema, and masses are absent. MUSCULOSKELETAL: No extremity edema. Compartments soft. No deformity. No tenderness in the extremities. All extremities neurovascularly intact. SKIN: Warm and dry. No acute rashes. NEUROLOGICAL: Alert and oriented. CN's 2-12 intact. No gross facial drooping. Strength 5/5, sensation intact. PSYCHIATRIC: Normal mood and affect. LABS  I have reviewed all labs for this visit. Results for orders placed or performed during the hospital encounter of 05/07/22   CBC with Auto Differential   Result Value Ref Range    WBC 9.7 4.0 - 11.0 K/uL    RBC 4.21 4.20 - 5.90 M/uL    Hemoglobin 14.2 13.5 - 17.5 g/dL    Hematocrit 41.0 40.5 - 52.5 %    MCV 97.6 80.0 - 100.0 fL    MCH 33.7 26.0 - 34.0 pg    MCHC 34.6 31.0 - 36.0 g/dL    RDW 13.2 12.4 - 15.4 %    Platelets 191 045 - 001 K/uL    MPV 6.7 5.0 - 10.5 fL    Neutrophils % 60.1 %    Lymphocytes % 29.4 %    Monocytes % 6.0 %    Eosinophils % 3.9 %    Basophils % 0.6 %    Neutrophils Absolute 5.8 1.7 - 7.7 K/uL    Lymphocytes Absolute 2.8 1.0 - 5.1 K/uL    Monocytes Absolute 0.6 0.0 - 1.3 K/uL    Eosinophils Absolute 0.4 0.0 - 0.6 K/uL    Basophils Absolute 0.1 0.0 - 0.2 K/uL   Comprehensive Metabolic Panel w/ Reflex to MG   Result Value Ref Range    Sodium 135 (L) 136 - 145 mmol/L    Potassium reflex Magnesium 3.9 3.5 - 5.1 mmol/L    Chloride 99 99 - 110 mmol/L    CO2 23 21 - 32 mmol/L    Anion Gap 13 3 - 16    Glucose 153 (H) 70 - 99 mg/dL    BUN 16 7 - 20 mg/dL    CREATININE 1.0 0.9 - 1.3 mg/dL    GFR Non-African American >60 >60    GFR African American >60 >60    Calcium 10.5 8.3 - 10.6 mg/dL    Total Protein 6.9 6.4 - 8.2 g/dL    Albumin 4.8 3.4 - 5.0 g/dL    Albumin/Globulin Ratio 2.3 (H) 1.1 - 2.2    Total Bilirubin 0.3 0.0 - 1.0 mg/dL    Alkaline Phosphatase 111 40 - 129 U/L    ALT 27 10 - 40 U/L    AST 27 15 - 37 U/L           ED COURSE / MDM  Patient seen and evaluated. Old records reviewed and pertinent information included in HPI. Labs and imaging reviewed and results discussed with patient.       Overall well appearing patient, in no acute distress, presenting for urine engine. Physical exam unremarkable. Differential diagnosis includes but is not limited to: Urinary retention, urethral rupture, dehydration, kidney stone, UTI, BETSY    Patient denies testicular pain. There is no testicular pain or other abnormality on  exam.  At this time, low suspicion for testicular torsion, epididymitis, or other acute testicular pathology. Workup showed:  ED Course as of 05/10/22 0630   Sat May 07, 2022   7984 Bladder scan only showed 10 cc of urine [ER]   0533 No leukocytosis, anemia, thrombocytopenia [ER]   0600 No significant electrolyte abnormalities or evidence of kidney dysfunction. [ER]   0600 Liver function testing unremarkable. [ER]      ED Course User Index  [ER] Beltran Patel MD        During the patient's ED course, the patient was given:  Medications   0.9 % sodium chloride bolus (0 mLs IntraVENous Stopped 5/7/22 0853)      At the time of signout, laboratory studies that have returned were overall reassuring. There was no evidence of acute kidney injury. Urinalysis is still pending. On initial bladder in, patient only had 10 cc of urine bladder and unclear if he is truly retaining versus just not making significant urine. Patient receiving fluid bolus, will see if he is able to urinate once has been rehydrated. Imaging studies pending assessment of ability to urinate and what urinalysis shows. I have signed out [facility] Emergency Department care to Dr. Bi Mo. We discussed the pertinent history, physical exam, completed/pending test results (if applicable) and current treatment plan. Please refer to his/her chart for the patients remaining Emergency Department course and final disposition. CLINICAL IMPRESSION  1. Acute urinary retention        Blood pressure 122/80, pulse 76, temperature 98 °F (36.7 °C), resp.  rate 16, height 6' (1.829 m), weight 274 lb (124.3 kg), SpO2 96 %. [address] was Signed out to oncoming provider in stable condition. DISCLAIMER: This chart was created using Dragon dictation software. Efforts were made by me to ensure accuracy, however some errors may be present due to limitations of this technology and occasionally words are not transcribed correctly.           Laura Lao MD  05/10/22 7295

## 2022-05-07 NOTE — ED PROVIDER NOTES
Patient was signed out to me by Dr. Adenike Starks at [id number] to follow up on lab work related to possible urinary retention. By the time I saw the patient, he was able to urinate and denied any symptoms during this. He did tell Dr. Adenike Starks that last time he felt this way he had a kidney stone. He reports having chronic hematuria but not following up with urology related to this. He denies any chest pain or shortness of breath. He denies any burning with urination or testicular pain. He denies any current abdominal pain or back pain. No nausea or vomiting. No fever. No concern with STDs or penile discharge. He did just start taking prazosin yesterday after talking to his primary doctor. Again, by time I saw him, he had no complaints and was feeling better and he urinated on his own without any assistance. Physical:   Gen: No acute distress. AOx3. Psych: Normal mood and affect  HEENT: NCAT  Neck: supple  Cardiac: RRR, pulses 2+ in upper extremities  Lungs: C2AB, no R/R/W  Abdomen: soft and nontender with no R/D/G, no CVA tenderness  Neuro: Moving all extremities equally, GCS equals 15    MDM: Patient was evaluated having trouble with urinating. His urinalysis was positive for hematuria but also for calcium oxalate crystals and therefore he wanted to get a CT of the abdomen and pelvis in case he did have a kidney stone since he has needed them surgically removed in the past.  There is no concern for any obvious kidney stone on CT per radiologist but did mention renal cysts. He was asymptomatic during repeat evaluation. Vitals were reassuring. He is aware that if he develops any return of trouble urinating with severe pain or fever, then return to the ED, but otherwise follow-up with primary doctor over the next week but also with urology over the next 1 to 2 weeks for his hematuria to ensure no concern with cancer.   He was well-appearing and in no acute distress at time of discharge and felt comfortable with this plan.    Impression: Hematuria, acute urinary retention which did resolve, renal cyst     Vanessa Chamorro MD  05/07/22 9497

## 2022-05-19 ENCOUNTER — APPOINTMENT (OUTPATIENT)
Dept: GENERAL RADIOLOGY | Age: 52
End: 2022-05-19
Payer: COMMERCIAL

## 2022-05-19 ENCOUNTER — HOSPITAL ENCOUNTER (EMERGENCY)
Age: 52
Discharge: HOME | End: 2022-05-19
Attending: EMERGENCY MEDICINE
Payer: COMMERCIAL

## 2022-05-19 ENCOUNTER — APPOINTMENT (OUTPATIENT)
Dept: CT IMAGING | Age: 52
End: 2022-05-19
Payer: COMMERCIAL

## 2022-05-19 VITALS
WEIGHT: 270 LBS | RESPIRATION RATE: 17 BRPM | DIASTOLIC BLOOD PRESSURE: 74 MMHG | BODY MASS INDEX: 36.57 KG/M2 | HEART RATE: 85 BPM | SYSTOLIC BLOOD PRESSURE: 121 MMHG | HEIGHT: 72 IN | TEMPERATURE: 97.8 F | OXYGEN SATURATION: 96 %

## 2022-05-19 DIAGNOSIS — R07.89 CHEST WALL PAIN: Primary | ICD-10-CM

## 2022-05-19 LAB
A/G RATIO: 2 (ref 1.1–2.2)
ALBUMIN SERPL-MCNC: 4.7 G/DL (ref 3.4–5)
ALP BLD-CCNC: 106 U/L (ref 40–129)
ALT SERPL-CCNC: 30 U/L (ref 10–40)
ANION GAP SERPL CALCULATED.3IONS-SCNC: 12 MMOL/L (ref 3–16)
AST SERPL-CCNC: 29 U/L (ref 15–37)
BASOPHILS ABSOLUTE: 0.1 K/UL (ref 0–0.2)
BASOPHILS RELATIVE PERCENT: 0.7 %
BILIRUB SERPL-MCNC: 0.4 MG/DL (ref 0–1)
BUN BLDV-MCNC: 14 MG/DL (ref 7–20)
CALCIUM SERPL-MCNC: 9.1 MG/DL (ref 8.3–10.6)
CHLORIDE BLD-SCNC: 101 MMOL/L (ref 99–110)
CO2: 24 MMOL/L (ref 21–32)
CREAT SERPL-MCNC: 0.9 MG/DL (ref 0.9–1.3)
EOSINOPHILS ABSOLUTE: 0.4 K/UL (ref 0–0.6)
EOSINOPHILS RELATIVE PERCENT: 4.3 %
GFR AFRICAN AMERICAN: >60
GFR NON-AFRICAN AMERICAN: >60
GLUCOSE BLD-MCNC: 147 MG/DL (ref 70–99)
HCT VFR BLD CALC: 42.5 % (ref 40.5–52.5)
HEMOGLOBIN: 14.7 G/DL (ref 13.5–17.5)
LYMPHOCYTES ABSOLUTE: 3.1 K/UL (ref 1–5.1)
LYMPHOCYTES RELATIVE PERCENT: 33.5 %
MCH RBC QN AUTO: 34 PG (ref 26–34)
MCHC RBC AUTO-ENTMCNC: 34.5 G/DL (ref 31–36)
MCV RBC AUTO: 98.5 FL (ref 80–100)
MONOCYTES ABSOLUTE: 0.6 K/UL (ref 0–1.3)
MONOCYTES RELATIVE PERCENT: 6.9 %
NEUTROPHILS ABSOLUTE: 5 K/UL (ref 1.7–7.7)
NEUTROPHILS RELATIVE PERCENT: 54.6 %
PDW BLD-RTO: 13.5 % (ref 12.4–15.4)
PLATELET # BLD: 269 K/UL (ref 135–450)
PMV BLD AUTO: 6.9 FL (ref 5–10.5)
POTASSIUM REFLEX MAGNESIUM: 3.8 MMOL/L (ref 3.5–5.1)
RBC # BLD: 4.31 M/UL (ref 4.2–5.9)
SODIUM BLD-SCNC: 137 MMOL/L (ref 136–145)
SPECIMEN STATUS: NORMAL
TOTAL PROTEIN: 7.1 G/DL (ref 6.4–8.2)
TROPONIN: <0.01 NG/ML
WBC # BLD: 9.1 K/UL (ref 4–11)

## 2022-05-19 PROCEDURE — 84484 ASSAY OF TROPONIN QUANT: CPT

## 2022-05-19 PROCEDURE — 85025 COMPLETE CBC W/AUTO DIFF WBC: CPT

## 2022-05-19 PROCEDURE — 6360000002 HC RX W HCPCS: Performed by: EMERGENCY MEDICINE

## 2022-05-19 PROCEDURE — 6360000004 HC RX CONTRAST MEDICATION: Performed by: EMERGENCY MEDICINE

## 2022-05-19 PROCEDURE — 93005 ELECTROCARDIOGRAM TRACING: CPT | Performed by: EMERGENCY MEDICINE

## 2022-05-19 PROCEDURE — 71260 CT THORAX DX C+: CPT

## 2022-05-19 PROCEDURE — 80053 COMPREHEN METABOLIC PANEL: CPT

## 2022-05-19 PROCEDURE — 99285 EMERGENCY DEPT VISIT HI MDM: CPT

## 2022-05-19 PROCEDURE — 71045 X-RAY EXAM CHEST 1 VIEW: CPT

## 2022-05-19 PROCEDURE — 96374 THER/PROPH/DIAG INJ IV PUSH: CPT

## 2022-05-19 RX ORDER — KETOROLAC TROMETHAMINE 30 MG/ML
30 INJECTION, SOLUTION INTRAMUSCULAR; INTRAVENOUS ONCE
Status: COMPLETED | OUTPATIENT
Start: 2022-05-19 | End: 2022-05-19

## 2022-05-19 RX ORDER — DICLOFENAC SODIUM 75 MG/1
75 TABLET, DELAYED RELEASE ORAL 2 TIMES DAILY PRN
Qty: 10 TABLET | Refills: 0 | Status: SHIPPED | OUTPATIENT
Start: 2022-05-19 | End: 2022-08-05

## 2022-05-19 RX ADMIN — KETOROLAC TROMETHAMINE 30 MG: 30 INJECTION, SOLUTION INTRAMUSCULAR at 03:06

## 2022-05-19 RX ADMIN — IOPAMIDOL 75 ML: 755 INJECTION, SOLUTION INTRAVENOUS at 02:02

## 2022-05-19 ASSESSMENT — ENCOUNTER SYMPTOMS
SHORTNESS OF BREATH: 0
VOMITING: 0
ABDOMINAL PAIN: 0

## 2022-05-19 ASSESSMENT — PAIN DESCRIPTION - LOCATION: LOCATION: CHEST

## 2022-05-19 ASSESSMENT — PAIN SCALES - GENERAL
PAINLEVEL_OUTOF10: 5
PAINLEVEL_OUTOF10: 5

## 2022-05-19 ASSESSMENT — PAIN - FUNCTIONAL ASSESSMENT: PAIN_FUNCTIONAL_ASSESSMENT: 0-10

## 2022-05-19 NOTE — ED PROVIDER NOTES
Emergency Department Provider Note  Location: [address] [place] [address]  ED  5/19/2022     Patient Identification  Heidi Beverly is a 45 y.o. male    Chief Complaint  Chest Pain (started yesterday \"after I was choking on spit\" states he was \"hypertensive\" at home )      Mode of Arrival  private car    HPI  (History provided by patient)  This is a 45 y.o. male with a PMH significant for DM presented today for chest pain. Patient said he was driving his daughter to school yesterday when he suddenly choked on his own saliva. He coughed several times and finally caught his breath. However, ever since, he has lower midsternal chest pain and upper abdominal pain. He described as 5/10 constant pain. Taking a deep breath does not make the pain worse. He states the pain would not resolve today. He check his own blood pressure at home and blood pressure was elevated in the 200s range so his family convinced him to come get evaluated. He denies sensation of food stuck in esophagus. He is able to eat and drink today. No nausea or vomiting. No jaw pain or left arm numbness. No neck pain. ROS  Review of Systems   Constitutional: Negative for chills and fever. HENT: Negative for congestion. Eyes: Negative for visual disturbance. Respiratory: Positive for cough and choking. Negative for shortness of breath. Cardiovascular: Positive for chest pain. Gastrointestinal: Negative for abdominal pain and vomiting. Genitourinary: Negative for dysuria and frequency. Musculoskeletal: Negative for neck pain. Skin: Negative for rash. Neurological: Negative for syncope and speech difficulty. Psychiatric/Behavioral: Negative for confusion. I have reviewed the following nursing documentation:  Allergies:    Allergies   Allergen Reactions    Ciprofloxacin Itching       Past medical history:  has a past medical history of Anxiety, Chronic back  and neck pain, Degenerative disc disease, Diabetes mellitus ([address] Diverticulosis, Fatty liver, H/O renal calculi, Hyperlipidemia, Hypertension, MDD (major depressive disorder), Obesity, Prediabetes, Scoliosis, and Tobacco dependence. Past surgical history:  has a past surgical history that includes Kidney stone surgery. Home medications:   Prior to Admission medications    Medication Sig Start Date End Date Taking? Authorizing Provider   tamsulosin (FLOMAX) 0.4 MG capsule Take 1 capsule by mouth daily 5/5/22   Israel Ordonez MD   escitalopram (LEXAPRO) 5 MG tablet Take 1 tablet by mouth daily 4/25/22   Israel Ordonez MD   atorvastatin (LIPITOR) 20 MG tablet Take 1 tablet by mouth daily 4/13/22   Ceferino Herzog. Tammie Fairly.,    lisinopril-hydroCHLOROthiazide (PRINZIDE;ZESTORETIC) 20-25 MG per tablet TAKE 1 TABLET BY MOUTH EVERY DAY 4/5/22   Sarah Brush MD   nicotine (NICODERM CQ) 14 MG/24HR Place 1 patch onto the skin every 24 hours  Patient not taking: Reported on 4/25/2022 4/5/22 4/5/23  Israel Ordonez MD   aspirin 81 MG chewable tablet Take 1 tablet by mouth daily. 8/19/14   Saida Manzo MD       Social history:  reports that he has been smoking cigarettes. He has a 12.50 pack-year smoking history. He has never used smokeless tobacco. He reports current alcohol use. He reports that he does not use drugs. Family history:    Family History   Problem Relation Age of Onset    Heart Disease Mother     Arrhythmia Mother     Diabetes Mother     Leukemia Father     Diabetes Maternal Grandmother     No Known Problems Paternal Grandmother     No Known Problems Paternal Grandfather        Exam  ED Triage Vitals   BP Temp Temp Source Pulse Resp SpO2 Height Weight   05/19/22 0047 05/19/22 0047 05/19/22 0047 05/19/22 0047 05/19/22 0047 05/19/22 0047 05/19/22 0046 05/19/22 0046   (!) 176/95 97.8 °F (36.6 °C) Oral 109 16 98 % 6' (1.829 m) 270 lb (122.5 kg)   Physical Exam  Vitals and nursing note reviewed.    Constitutional: General: He is not in acute distress. Appearance: He is well-developed. He is not diaphoretic. HENT:      Head: Normocephalic and atraumatic. Eyes:      General: No scleral icterus. Right eye: No discharge. Left eye: No discharge. Conjunctiva/sclera: Conjunctivae normal.   Neck:      Trachea: No tracheal deviation. Cardiovascular:      Rate and Rhythm: Regular rhythm. Tachycardia present. Heart sounds: Normal heart sounds. No murmur heard. Pulmonary:      Effort: Pulmonary effort is normal. No respiratory distress. Breath sounds: Normal breath sounds. No stridor. No wheezing. Chest:      Chest wall: Tenderness (lower sternal area and left lower chest) present. No crepitus. Abdominal:      General: There is no distension. Palpations: Abdomen is soft. Tenderness: There is no abdominal tenderness. There is no guarding or rebound. Musculoskeletal:         General: No deformity. Cervical back: Neck supple. Right lower leg: No edema. Left lower leg: No edema. Skin:     General: Skin is warm and dry. Findings: No erythema or rash. Neurological:      Mental Status: He is alert and oriented to person, place, and time. Cranial Nerves: No dysarthria or facial asymmetry. Psychiatric:         Mood and Affect: Mood and affect normal.         Behavior: Behavior normal.           MDM/ED Course  ED Medication Orders (From admission, onward)    Start Ordered     Status Ordering Provider    05/19/22 0300 05/19/22 0252  ketorolac (TORADOL) injection 30 mg  ONCE         Last MAR action: Given - by SERGIO CASTANEDA on 05/19/22 at [facility]    05/19/22 0142 05/19/22 0142  iopamidol (ISOVUE-370) 76 % injection 75 mL  IMG ONCE PRN         Last MAR action: Given - [facility]          EKG  The Ekg interpreted by me in the absence of a cardiologist shows.   sinus tachycardia, eang=[id number]    Axis is Normal  QTc is  normal  Intervals and Durations are unremarkable. No specific ST-T wave changes appreciated. No evidence of acute ischemia. No significant change from prior EKG dated 2/2/2022      Radiology  XR CHEST PORTABLE    Result Date: 5/19/2022  EXAMINATION: ONE XRAY VIEW OF THE CHEST 5/19/2022 1:03 am COMPARISON: 07/22/2021 HISTORY: ORDERING SYSTEM PROVIDED HISTORY: cp TECHNOLOGIST PROVIDED HISTORY: Reason for exam:->cp FINDINGS: Heart size and configuration are normal.  Hilar and mediastinal structures are unremarkable. The lungs are clear. No pneumothorax or pleural fluid. There is an old healed fracture of the posterolateral right 8th rib. No acute bone finding. No acute cardiopulmonary disease. CT CHEST PULMONARY EMBOLISM W CONTRAST    Result Date: 5/19/2022  EXAMINATION: CTA OF THE CHEST 5/19/2022 1:42 am TECHNIQUE: CTA of the chest was performed after the administration of intravenous contrast.  Multiplanar reformatted images are provided for review. MIP images are provided for review. Automated exposure control, iterative reconstruction, and/or weight based adjustment of the mA/kV was utilized to reduce the radiation dose to as low as reasonably achievable. COMPARISON: None. HISTORY: ORDERING SYSTEM PROVIDED HISTORY: tachycardia, chest pain TECHNOLOGIST PROVIDED HISTORY: Reason for exam:->tachycardia, chest pain Decision Support Exception - unselect if not a suspected or confirmed emergency medical condition->Emergency Medical Condition (MA) Reason for Exam: patient states chest pain started yesterday after choking on his spit. FINDINGS: Pulmonary Arteries: Pulmonary arteries are adequately opacified for evaluation. No evidence of intraluminal filling defect to suggest pulmonary embolism. Main pulmonary artery is normal in caliber. Mediastinum: No evidence of mediastinal lymphadenopathy. The heart and pericardium demonstrate no acute abnormality.   There is no acute abnormality of the thoracic aorta. Lungs/pleura: No pneumothorax. No pleural effusion. Mild centrilobular emphysematous change in the bilateral lungs. No pulmonary consolidation, mass, or suspicious nodule. Upper Abdomen: Diffuse hepatic steatosis. Soft Tissues/Bones: Multilevel thoracic spondylosis. No evidence of pulmonary embolism. COPD. Diffuse hepatic steatosis.         Labs  Results for orders placed or performed during the hospital encounter of 05/19/22   CBC with Auto Differential   Result Value Ref Range    WBC 9.1 4.0 - 11.0 K/uL    RBC 4.31 4.20 - 5.90 M/uL    Hemoglobin 14.7 13.5 - 17.5 g/dL    Hematocrit 42.5 40.5 - 52.5 %    MCV 98.5 80.0 - 100.0 fL    MCH 34.0 26.0 - 34.0 pg    MCHC 34.5 31.0 - 36.0 g/dL    RDW 13.5 12.4 - 15.4 %    Platelets 179 140 - 002 K/uL    MPV 6.9 5.0 - 10.5 fL    Neutrophils % 54.6 %    Lymphocytes % 33.5 %    Monocytes % 6.9 %    Eosinophils % 4.3 %    Basophils % 0.7 %    Neutrophils Absolute 5.0 1.7 - 7.7 K/uL    Lymphocytes Absolute 3.1 1.0 - 5.1 K/uL    Monocytes Absolute 0.6 0.0 - 1.3 K/uL    Eosinophils Absolute 0.4 0.0 - 0.6 K/uL    Basophils Absolute 0.1 0.0 - 0.2 K/uL   Comprehensive Metabolic Panel w/ Reflex to MG   Result Value Ref Range    Sodium 137 136 - 145 mmol/L    Potassium reflex Magnesium 3.8 3.5 - 5.1 mmol/L    Chloride 101 99 - 110 mmol/L    CO2 24 21 - 32 mmol/L    Anion Gap 12 3 - 16    Glucose 147 (H) 70 - 99 mg/dL    BUN 14 7 - 20 mg/dL    CREATININE 0.9 0.9 - 1.3 mg/dL    GFR Non-African American >60 >60    GFR African American >60 >60    Calcium 9.1 8.3 - 10.6 mg/dL    Total Protein 7.1 6.4 - 8.2 g/dL    Albumin 4.7 3.4 - 5.0 g/dL    Albumin/Globulin Ratio 2.0 1.1 - 2.2    Total Bilirubin 0.4 0.0 - 1.0 mg/dL    Alkaline Phosphatase 106 40 - 129 U/L    ALT 30 10 - 40 U/L    AST 29 15 - 37 U/L   Troponin   Result Value Ref Range    Troponin <0.01 <0.01 ng/mL   Sample possible blood bank testing   Result Value Ref Range    Specimen Status GURVINDER          - Patient seen and evaluated in room 5.  45 y.o. male presented for chest pain that started yesterday after he choked on saliva and coughed multiple times. S/s concerning for Boerhaave syndrome. Exam did not show crepitus. Patient also denies pleuritic chest pain. He was tachycardic on arrival.  No leg edema.  - Initial chest x-ray showed no acute cardiopulmonary disease  - EKG showed sinus tachycardia. Trop < 0.01  - Patient was placed on telemetry during his/her ED stay and no malignant dysrhythmia observed. - Pertinent old records reviewed. - CT - no PE. No pneumomediastinum. Labs reassuring  - I discussed the results with patient. We agreed to discharge home. - Return precautions also discussed. patient verbalized understanding of care plan and agreed to follow-up with PCP as advised. I estimate there is LOW risk for ACUTE RESPIRATORY FAILURE, ACUTE CORONARY SYNDROME, SEVERE COPD/ASTHMA EXACERBATION, ACUTE EXACERBATION OF CONGESTIVE HEART FAILURE, PERICARDIAL TAMPONADE, PNEUMONIA WITH HYPOXIA, PNEUMOTHORAX, PULMONARY EMBOLISM WITH RIGHT HEART STRAIN, SEPSIS, and THORACIC DISSECTION,  thus I consider the discharge disposition reasonable. Paul Cruz and I have discussed the diagnosis and risks, and we agree with discharging home to follow-up with PCP. We also discussed returning to the Emergency Department immediately if new or worsening symptoms occur. We have discussed the symptoms which are most concerning (e.g., bloody sputum, fever, worsening pain or shortness of breath) that necessitate immediate return. Clinical Impression:  1. Chest wall pain          Disposition:  Discharge to home in good condition. Blood pressure 121/74, pulse 85, temperature 97.8 °F (36.6 °C), temperature source Oral, resp. rate 17, height 6' (1.829 m), weight 270 lb (122.5 kg), SpO2 96 %. Patient was given scripts for the following medications. I counseled patient how to take these medications. Discharge Medication List as of 5/19/2022  3:09 AM      START taking these medications    Details   diclofenac (VOLTAREN) 75 MG EC tablet Take 1 tablet by mouth 2 times daily as needed for Pain, Disp-10 tablet, R-0Normal             Disposition referral (if applicable): Nikki Simon MD  [address] [place] [address] Franklin Walters [address]    Schedule an appointment as soon as possible for a visit in 3 days       This chart was generated in part by using Dragon Dictation system and may contain errors related to that system including errors in grammar, punctuation, and spelling, as well as words and phrases that may be inappropriate. If there are any questions or concerns please feel free to contact the dictating provider for clarification.      Grace Brower MD  [facility] Nik Doshi MD  05/21/22 2630

## 2022-05-20 DIAGNOSIS — F41.1 GENERALIZED ANXIETY DISORDER: ICD-10-CM

## 2022-05-20 LAB
EKG ATRIAL RATE: 105 BPM
EKG DIAGNOSIS: NORMAL
EKG P AXIS: 61 DEGREES
EKG P-R INTERVAL: 174 MS
EKG Q-T INTERVAL: 336 MS
EKG QRS DURATION: 88 MS
EKG QTC CALCULATION (BAZETT): 444 MS
EKG R AXIS: 54 DEGREES
EKG T AXIS: 59 DEGREES
EKG VENTRICULAR RATE: 105 BPM

## 2022-05-20 PROCEDURE — 93010 ELECTROCARDIOGRAM REPORT: CPT | Performed by: INTERNAL MEDICINE

## 2022-05-20 NOTE — TELEPHONE ENCOUNTER
Refill Request       Last Seen: Last Seen Department: 4/25/2022  Last Seen by PCP: 4/25/2022    Last Written: 04/25/22 #30 w/ no refills     Next Appointment:   Future Appointments   Date Time Provider Regina Jeri   5/26/2022  1:00 PM MD Philomena Lucero 7 AND BLANKA RODRIGUEZ   7/5/2022 12:30 PM MD Philomena Lucero 7 AND RES JENNIFER         Requested Prescriptions     Pending Prescriptions Disp Refills    escitalopram (LEXAPRO) 5 MG tablet [Pharmacy Med Name: ESCITALOPRAM 5 MG TABLET] 30 tablet 0     Sig: TAKE 1 TABLET BY MOUTH EVERY DAY

## 2022-05-21 ASSESSMENT — ENCOUNTER SYMPTOMS
CHOKING: 1
COUGH: 1

## 2022-05-23 RX ORDER — ESCITALOPRAM OXALATE 5 MG/1
TABLET ORAL
Qty: 30 TABLET | Refills: 0 | Status: SHIPPED | OUTPATIENT
Start: 2022-05-23 | End: 2022-06-20

## 2022-05-25 NOTE — PATIENT INSTRUCTIONS
Get Tdap, pneumovax,  and Shingrix vaccines at [address], [address] [address]  Ph: [phone]    Read about the DASH diet    Goal:  150min walking weekly.     [facility]  [address] [facility], [address] [place] [address]  Phone: [phone]

## 2022-05-25 NOTE — PROGRESS NOTES
PROGRESS NOTE     Oksana Peters MD  [address] [facility], [address], [address] [facility] [place] [address]         Phone: [phone]    Date of Service:  5/26/2022     Patient ID: Richmond Wills is a 45 y.o. male      Assessment / Plan:     . Anxiety  Discussed that even though the 2 days the Lexapro made him feel tired, most the time this side effects are temporary and can go away after a week or 2. Recommend he try the Lexapro 5 mg for couple weeks and see how he does. We can always stop it in the future if the fatigue does not go away. Reviewed side effects and expected course. Patient expressed understanding. Patient to follow-up in 1 month for reevaluation. Possible RUPINDER  Referring to sleep doctor for testing and treatment if test positive. HM:  Get Tdap, shingrix, pneumovax and moderna booster at pharmacy (as medicaid)    Referred to GI for colonoscopy at last visit. Giving number to schedule again. Hep C and HIV screens: negative      Subjective:     CC: anxiety, possible sleep apnea    HPI    1 months ago, patient was started on Lexapro 5 mg for generalized anxiety disorder. We started him on a low dose, as he tried SSRIs in the past and they caused some nausea and fatigue. Patient tends to ruminate about his health and possible her mortality quite a lot. Small physical symptoms make him worry that he is dying. External stressors:  · Works 2-3 jobs. 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 Patient states he took the 5 mg dose of Lexapro for only 2 days, started to feel fatigued, then stopped it because of the amount of work he had on his plate.   He states he decided to restart it yesterday. He would like to give it a chance to work. Patient denies symptoms of depression. No depressed mood, Ashley, feelings of hopelessness or worthlessness, suicidal homicidal ideation. No panic attacks. Additionally, patient would like to discuss some recent symptoms has been having while sleeping. He is often waking up feeling like he is choking. He does admit to daytime fatigue, but is only sleeping 4 to 5 hours at night. He does believe he snores at night. Denies trouble with cognition or morning headaches. ROS:    See hpi      Vitals:    05/26/22 1300   BP: 126/74   Site: Left Upper Arm   Position: Sitting   Cuff Size: Large Adult   Pulse: 87   Resp: 18   Temp: 97 °F (36.1 °C)   TempSrc: Infrared   SpO2: 96%   Weight: 270 lb (122.5 kg)   Height: 6' (1.829 m)       Outpatient Medications Marked as Taking for the 5/26/22 encounter (Office Visit) with Kajal Hines MD   Medication Sig Dispense Refill    escitalopram (LEXAPRO) 5 MG tablet TAKE 1 TABLET BY MOUTH EVERY DAY 30 tablet 0    atorvastatin (LIPITOR) 20 MG tablet Take 1 tablet by mouth daily 90 tablet 3    lisinopril-hydroCHLOROthiazide (PRINZIDE;ZESTORETIC) 20-25 MG per tablet TAKE 1 TABLET BY MOUTH EVERY DAY 90 tablet 1             Objective:   Constitutional:   · Reviewed vitals above  · Well Nourished, well developed, no distress       Skin:  · No rashes on inspection  · No areas of increased heat or induration on palpation  Psych:  · Normal mood and affect  · Normal insight and judgement        EMR Dragon/transcription disclaimer:  Much of this encounter note is electronic transcription/translation of spoken language to printed texts. The electronic translation of spoken language may be erroneous, or at times, nonsensical words or phrases may be inadvertently transcribed.   Although I have reviewed the note for such errors, some may still exist.

## 2022-05-26 ENCOUNTER — OFFICE VISIT (OUTPATIENT)
Dept: PRIMARY CARE CLINIC | Age: 52
End: 2022-05-26
Payer: COMMERCIAL

## 2022-05-26 VITALS
OXYGEN SATURATION: 96 % | HEART RATE: 87 BPM | DIASTOLIC BLOOD PRESSURE: 74 MMHG | BODY MASS INDEX: 36.57 KG/M2 | HEIGHT: 72 IN | RESPIRATION RATE: 18 BRPM | WEIGHT: 270 LBS | TEMPERATURE: 97 F | SYSTOLIC BLOOD PRESSURE: 126 MMHG

## 2022-05-26 DIAGNOSIS — F41.9 ANXIETY: Primary | ICD-10-CM

## 2022-05-26 DIAGNOSIS — G47.33 OSA (OBSTRUCTIVE SLEEP APNEA): ICD-10-CM

## 2022-05-26 PROCEDURE — 3017F COLORECTAL CA SCREEN DOC REV: CPT | Performed by: FAMILY MEDICINE

## 2022-05-26 PROCEDURE — G8417 CALC BMI ABV UP PARAM F/U: HCPCS | Performed by: FAMILY MEDICINE

## 2022-05-26 PROCEDURE — G8427 DOCREV CUR MEDS BY ELIG CLIN: HCPCS | Performed by: FAMILY MEDICINE

## 2022-05-26 PROCEDURE — 99214 OFFICE O/P EST MOD 30 MIN: CPT | Performed by: FAMILY MEDICINE

## 2022-05-26 PROCEDURE — 4004F PT TOBACCO SCREEN RCVD TLK: CPT | Performed by: FAMILY MEDICINE

## 2022-05-26 SDOH — ECONOMIC STABILITY: TRANSPORTATION INSECURITY
IN THE PAST 12 MONTHS, HAS LACK OF TRANSPORTATION KEPT YOU FROM MEETINGS, WORK, OR FROM GETTING THINGS NEEDED FOR DAILY LIVING?: NO

## 2022-05-26 SDOH — ECONOMIC STABILITY: TRANSPORTATION INSECURITY
IN THE PAST 12 MONTHS, HAS THE LACK OF TRANSPORTATION KEPT YOU FROM MEDICAL APPOINTMENTS OR FROM GETTING MEDICATIONS?: NO

## 2022-05-26 ASSESSMENT — PATIENT HEALTH QUESTIONNAIRE - PHQ9
6. FEELING BAD ABOUT YOURSELF - OR THAT YOU ARE A FAILURE OR HAVE LET YOURSELF OR YOUR FAMILY DOWN: 0
5. POOR APPETITE OR OVEREATING: 0
SUM OF ALL RESPONSES TO PHQ QUESTIONS 1-9: 0
7. TROUBLE CONCENTRATING ON THINGS, SUCH AS READING THE NEWSPAPER OR WATCHING TELEVISION: 0
8. MOVING OR SPEAKING SO SLOWLY THAT OTHER PEOPLE COULD HAVE NOTICED. OR THE OPPOSITE, BEING SO FIGETY OR RESTLESS THAT YOU HAVE BEEN MOVING AROUND A LOT MORE THAN USUAL: 0
SUM OF ALL RESPONSES TO PHQ QUESTIONS 1-9: 0
1. LITTLE INTEREST OR PLEASURE IN DOING THINGS: 0
SUM OF ALL RESPONSES TO PHQ9 QUESTIONS 1 & 2: 0
2. FEELING DOWN, DEPRESSED OR HOPELESS: 0
9. THOUGHTS THAT YOU WOULD BE BETTER OFF DEAD, OR OF HURTING YOURSELF: 0
3. TROUBLE FALLING OR STAYING ASLEEP: 0
4. FEELING TIRED OR HAVING LITTLE ENERGY: 0
SUM OF ALL RESPONSES TO PHQ QUESTIONS 1-9: 0
SUM OF ALL RESPONSES TO PHQ QUESTIONS 1-9: 0

## 2022-05-26 ASSESSMENT — ANXIETY QUESTIONNAIRES
7. FEELING AFRAID AS IF SOMETHING AWFUL MIGHT HAPPEN: 0
IF YOU CHECKED OFF ANY PROBLEMS ON THIS QUESTIONNAIRE, HOW DIFFICULT HAVE THESE PROBLEMS MADE IT FOR YOU TO DO YOUR WORK, TAKE CARE OF THINGS AT HOME, OR GET ALONG WITH OTHER PEOPLE: NOT DIFFICULT AT ALL
1. FEELING NERVOUS, ANXIOUS, OR ON EDGE: 1
5. BEING SO RESTLESS THAT IT IS HARD TO SIT STILL: 0
2. NOT BEING ABLE TO STOP OR CONTROL WORRYING: 1
3. WORRYING TOO MUCH ABOUT DIFFERENT THINGS: 0
6. BECOMING EASILY ANNOYED OR IRRITABLE: 0
4. TROUBLE RELAXING: 0
GAD7 TOTAL SCORE: 2

## 2022-05-26 ASSESSMENT — LIFESTYLE VARIABLES
HOW MANY STANDARD DRINKS CONTAINING ALCOHOL DO YOU HAVE ON A TYPICAL DAY: 3 OR 4
HOW OFTEN DO YOU HAVE A DRINK CONTAINING ALCOHOL: 2-4 TIMES A MONTH

## 2022-05-27 RX ORDER — TAMSULOSIN HYDROCHLORIDE 0.4 MG/1
CAPSULE ORAL
Qty: 30 CAPSULE | Refills: 0 | OUTPATIENT
Start: 2022-05-27

## 2022-06-08 RX ORDER — NICOTINE 21 MG/24HR
1 PATCH, TRANSDERMAL 24 HOURS TRANSDERMAL EVERY 24 HOURS
Qty: 28 PATCH | Refills: 2 | Status: SHIPPED | OUTPATIENT
Start: 2022-06-08 | End: 2023-06-08

## 2022-06-08 NOTE — TELEPHONE ENCOUNTER
Refill Request     Last Seen: Last Seen Department: 5/26/2022  Last Seen by PCP: 5/26/2022    Last Written: 04/05/2022  #30 patch w/ 1 refill    Next Appointment:   Future Appointments   Date Time Provider Regina Jeri   7/5/2022 12:30 PM Sarah Quinn MD [facility] AND RES [facility]           Requested Prescriptions     Pending Prescriptions Disp Refills    nicotine ([address]) 14 MG/24HR [Pharmacy Med Name: NICOTINE 14 MG/24HR PATCH] 28 patch 2     Sig: PLACE 1 PATCH ONTO THE SKIN EVERY 24 HOURS. 0

## 2022-06-20 DIAGNOSIS — F41.1 GENERALIZED ANXIETY DISORDER: ICD-10-CM

## 2022-06-20 RX ORDER — ESCITALOPRAM OXALATE 5 MG/1
TABLET ORAL
Qty: 30 TABLET | Refills: 0 | Status: ON HOLD | OUTPATIENT
Start: 2022-06-20 | End: 2022-08-05 | Stop reason: HOSPADM

## 2022-06-20 NOTE — TELEPHONE ENCOUNTER
Refill Request     Last Seen: Last Seen Department: 5/26/2022  Last Seen by PCP: 5/26/2022    Last Written: 5/23, 0 refills    Next Appointment:   Future Appointments   Date Time Provider Regina Wilcox   7/5/2022 12:30 PM Sarah Underwood MD [facility] AND [facility]         Requested Prescriptions     Pending Prescriptions Disp Refills    escitalopram (LEXAPRO) 5 MG tablet [Pharmacy Med Name: ESCITALOPRAM 5 MG TABLET] 30 tablet 0     Sig: TAKE 1 TABLET BY MOUTH EVERY DAY

## 2022-06-24 ENCOUNTER — TELEPHONE (OUTPATIENT)
Dept: PRIMARY CARE CLINIC | Age: 52
End: 2022-06-24

## 2022-06-24 NOTE — TELEPHONE ENCOUNTER
Dr. Merlyn Hough,   Sorry to bother you. Earlier today I received a letter for [employer] duty starting July 11th. Due to the constant pain I am in from the chronic Degenerative disc disease I was diagnosed with over a decade ago, I am unable to sit for an extended period of time. The pain gets overwhelming at times, and something my poor family has had to deal with on a daily basis. I wanted to see if anyway possible you could write a letter for me, so I can be dismissed from [employer] duty due to my chronic medical issues. They said you must have a letter from your doctor, and send as soon as possible.    Thank you Dr. Soco Feliciano

## 2022-06-27 ENCOUNTER — PATIENT MESSAGE (OUTPATIENT)
Dept: PRIMARY CARE CLINIC | Age: 52
End: 2022-06-27

## 2022-07-15 DIAGNOSIS — F41.1 GENERALIZED ANXIETY DISORDER: ICD-10-CM

## 2022-07-15 NOTE — TELEPHONE ENCOUNTER
Medication refill     Last visit   05/26/2022    Next Visit 07/19/2022          escitalopram (LEXAPRO) 5 MG tablet  06/20/2022  #30 w/ no refill

## 2022-07-19 ENCOUNTER — OFFICE VISIT (OUTPATIENT)
Dept: PRIMARY CARE CLINIC | Age: 52
End: 2022-07-19
Payer: COMMERCIAL

## 2022-07-19 VITALS
DIASTOLIC BLOOD PRESSURE: 70 MMHG | BODY MASS INDEX: 36.16 KG/M2 | WEIGHT: 267 LBS | SYSTOLIC BLOOD PRESSURE: 124 MMHG | HEIGHT: 72 IN | OXYGEN SATURATION: 97 % | HEART RATE: 93 BPM | TEMPERATURE: 97.2 F

## 2022-07-19 DIAGNOSIS — E78.00 PURE HYPERCHOLESTEROLEMIA: Primary | ICD-10-CM

## 2022-07-19 DIAGNOSIS — F41.9 ANXIETY: ICD-10-CM

## 2022-07-19 DIAGNOSIS — F17.200 TOBACCO DEPENDENCE: ICD-10-CM

## 2022-07-19 PROCEDURE — 99214 OFFICE O/P EST MOD 30 MIN: CPT | Performed by: FAMILY MEDICINE

## 2022-07-19 RX ORDER — ESCITALOPRAM OXALATE 5 MG/1
TABLET ORAL
Qty: 30 TABLET | Refills: 0 | OUTPATIENT
Start: 2022-07-19

## 2022-07-19 ASSESSMENT — PATIENT HEALTH QUESTIONNAIRE - PHQ9
2. FEELING DOWN, DEPRESSED OR HOPELESS: 0
SUM OF ALL RESPONSES TO PHQ9 QUESTIONS 1 & 2: 0
SUM OF ALL RESPONSES TO PHQ QUESTIONS 1-9: 1
SUM OF ALL RESPONSES TO PHQ QUESTIONS 1-9: 1
8. MOVING OR SPEAKING SO SLOWLY THAT OTHER PEOPLE COULD HAVE NOTICED. OR THE OPPOSITE, BEING SO FIGETY OR RESTLESS THAT YOU HAVE BEEN MOVING AROUND A LOT MORE THAN USUAL: 0
1. LITTLE INTEREST OR PLEASURE IN DOING THINGS: 0
9. THOUGHTS THAT YOU WOULD BE BETTER OFF DEAD, OR OF HURTING YOURSELF: 0
7. TROUBLE CONCENTRATING ON THINGS, SUCH AS READING THE NEWSPAPER OR WATCHING TELEVISION: 0
10. IF YOU CHECKED OFF ANY PROBLEMS, HOW DIFFICULT HAVE THESE PROBLEMS MADE IT FOR YOU TO DO YOUR WORK, TAKE CARE OF THINGS AT HOME, OR GET ALONG WITH OTHER PEOPLE: 0
5. POOR APPETITE OR OVEREATING: 0
6. FEELING BAD ABOUT YOURSELF - OR THAT YOU ARE A FAILURE OR HAVE LET YOURSELF OR YOUR FAMILY DOWN: 0
SUM OF ALL RESPONSES TO PHQ QUESTIONS 1-9: 1
4. FEELING TIRED OR HAVING LITTLE ENERGY: 1
3. TROUBLE FALLING OR STAYING ASLEEP: 0
SUM OF ALL RESPONSES TO PHQ QUESTIONS 1-9: 1

## 2022-07-19 ASSESSMENT — ANXIETY QUESTIONNAIRES
3. WORRYING TOO MUCH ABOUT DIFFERENT THINGS: 1
GAD7 TOTAL SCORE: 3
4. TROUBLE RELAXING: 1
7. FEELING AFRAID AS IF SOMETHING AWFUL MIGHT HAPPEN: 0
6. BECOMING EASILY ANNOYED OR IRRITABLE: 0
2. NOT BEING ABLE TO STOP OR CONTROL WORRYING: 0
5. BEING SO RESTLESS THAT IT IS HARD TO SIT STILL: 0
IF YOU CHECKED OFF ANY PROBLEMS ON THIS QUESTIONNAIRE, HOW DIFFICULT HAVE THESE PROBLEMS MADE IT FOR YOU TO DO YOUR WORK, TAKE CARE OF THINGS AT HOME, OR GET ALONG WITH OTHER PEOPLE: NOT DIFFICULT AT ALL
1. FEELING NERVOUS, ANXIOUS, OR ON EDGE: 1

## 2022-07-19 NOTE — PATIENT INSTRUCTIONS
Get Tdap, pneumovax,  and Shingrix vaccines at [address], [address] [address]  Ph: [phone]    Read about the DASH diet    Goal:  150min walking weekly.     [facility]  [address] [facility], [address] [place] [address]  Phone: [phone]

## 2022-07-19 NOTE — PROGRESS NOTES
PROGRESS NOTE     Adam Che MD  [address], [facility] [place] [address]         Phone: [phone]    Date of Service:  7/19/2022     Patient ID: Quang Sharif is a 45 y.o. male      Assessment / Plan:     Anxiety  Restart 5mg lexapro. Discussed the importance of med compliance, and trying not to let his anxiety about taking medicine get in the way of getting him help for his anxiety. Patient to keep me up-to-date with side effects and how he is doing. All side effects reviewed with patient today. And expressed understanding. Tob dep   Down to 4-7 cigs daily with nicoderm 14mg patch. Congratulated patient. Patient to let me know when he is ready to go down to 7 mg patch. HLD  Checking FLP as on lipitor 20mg for 3 months. Will f/u with results and adjust as needed    Possible RUPINDER  Referred to sleep doctor at last appointment. Encouraged patient to schedule. HM:  Get Tdap, shingrix, pneumovax and moderna booster at pharmacy (as medicaid)    Referred to GI for colonoscopy at last visit. Giving number to schedule again. Hep C and HIV screens: negative        F/u in 3 months for all chronic med conditions. Subjective:     CC: anxiety, possible sleep apnea, hyperlipidemia, tobacco dependence    HPI    69-year-old white male here to address the above chronic medical conditions today. 1 month ago, Lexapro was restarted at a lower dose, 5 mg, as the 10 mg dose caused him some fatigue. He states he only took it for 3 to 4 days, thought he felt a little more tired than usual, and stopped it. He was worried the fatigue would worsen, which would make it hard to work. 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     Patient revealed he is often very hesitant to start new medicines due to a past addiction to Vicodin pain pills. He states about 7 years ago, his primary care doctor was prescribing Vicodin for chronic back pain. He slowly had to increase the amount he was taking due to tolerance, before he realized he was reliant on him. He was able to wean off of them successfully, but states it was very hard, and is very hesitant to start new medicine because of this experience. Patient has been compliant with his blood pressure medicine and his cholesterol medicine. He has now been on his cholesterol medicine for 3 months, and would like to recheck his cholesterol levels to see if there is an improvement. He denies any side effects from the medication. Patient is also been compliant with NicoDerm 14 mg patches over the last 2 weeks. He is down to 4 to 7 cigarettes a day. Patient was referred to colonoscopy and sleep doctor at last appointment, but has yet to schedule. At last visit he reported recent symptoms of waking up feeling like he was choking along with daytime fatigue. Patient denies symptoms of depression. No depressed mood, anhedonia feelings of hopelessness or worthlessness, suicidal homicidal ideation. No panic attacks. ROS:    See hpi      Vitals:    07/19/22 1301   BP: 124/70   Site: Left Upper Arm   Position: Sitting   Cuff Size: Small Adult   Pulse: 93   Temp: 97.2 °F (36.2 °C)   TempSrc: Temporal   SpO2: 97%   Weight: 267 lb (121.1 kg)   Height: 6' (1.829 m)       Outpatient Medications Marked as Taking for the 7/19/22 encounter (Office Visit) with Smitha Gallego MD   Medication Sig Dispense Refill    nicotine (NICODERM CQ) 14 MG/24HR PLACE 1 PATCH ONTO THE SKIN EVERY 24 HOURS.  28 patch 2    atorvastatin (LIPITOR) 20 MG tablet Take 1 tablet by mouth daily 90 tablet 3    lisinopril-hydroCHLOROthiazide (PRINZIDE;ZESTORETIC) 20-25 MG per tablet TAKE 1 TABLET BY MOUTH EVERY DAY 90 tablet 1             Objective:   Constitutional:   Reviewed vitals above  Well Nourished, well developed, no distress       Resp:  Normal effort  Clear to auscultation bilaterally without rhonchi, wheezing or crackles  Cardiovascular: On auscultation, normal S1 and S2 without murmurs, rubs or gallops  No bruits of bilateral carotids and no JVD  Gastrointestinal:  Nontender, nondistended, and no masses  No hepatosplenomegaly  Musculoskeletal:  Normal Gait  All extremities without clubbing, cyanosis or edema  Skin:  No rashes on inspection  No areas of increased heat or induration on palpation  Psych:  Normal mood and affect  Normal insight and judgement          EMR Dragon/transcription disclaimer:  Much of this encounter note is electronic transcription/translation of spoken language to printed texts. The electronic translation of spoken language may be erroneous, or at times, nonsensical words or phrases may be inadvertently transcribed.   Although I have reviewed the note for such errors, some may still exist.

## 2022-08-03 DIAGNOSIS — Z91.89 RISK OF MYOCARDIAL INFARCTION OR STROKE 7.5% OR GREATER IN NEXT 10 YEARS: ICD-10-CM

## 2022-08-03 RX ORDER — ATORVASTATIN CALCIUM 20 MG/1
20 TABLET, FILM COATED ORAL DAILY
Qty: 90 TABLET | Refills: 3 | Status: SHIPPED | OUTPATIENT
Start: 2022-08-03

## 2022-08-03 NOTE — TELEPHONE ENCOUNTER
Refill Request       Last Seen: Last Seen Department: 7/19/2022  Last Seen by PCP: Visit date not found    Last Written: 4/13/22    Next Appointment:   Future Appointments   Date Time Provider Regina Wilcox   10/20/2022  1:00 PM Sarah Rob MD [facility]         Requested Prescriptions     Pending Prescriptions Disp Refills    atorvastatin (LIPITOR) 20 MG tablet 90 tablet 3     Sig: Take 1 tablet by mouth in the morning.

## 2022-08-05 ENCOUNTER — HOSPITAL ENCOUNTER (OUTPATIENT)
Age: 52
Setting detail: OBSERVATION
Discharge: HOME | End: 2022-08-05
Attending: EMERGENCY MEDICINE | Admitting: INTERNAL MEDICINE
Payer: COMMERCIAL

## 2022-08-05 ENCOUNTER — APPOINTMENT (OUTPATIENT)
Dept: NUCLEAR MEDICINE | Age: 52
End: 2022-08-05
Payer: COMMERCIAL

## 2022-08-05 ENCOUNTER — APPOINTMENT (OUTPATIENT)
Dept: GENERAL RADIOLOGY | Age: 52
End: 2022-08-05
Payer: COMMERCIAL

## 2022-08-05 VITALS
SYSTOLIC BLOOD PRESSURE: 112 MMHG | RESPIRATION RATE: 16 BRPM | TEMPERATURE: 97.9 F | HEART RATE: 67 BPM | DIASTOLIC BLOOD PRESSURE: 73 MMHG | WEIGHT: 250 LBS | BODY MASS INDEX: 33.86 KG/M2 | HEIGHT: 72 IN | OXYGEN SATURATION: 95 %

## 2022-08-05 DIAGNOSIS — R07.9 CHEST PAIN, UNSPECIFIED TYPE: Primary | ICD-10-CM

## 2022-08-05 DIAGNOSIS — E87.6 HYPOKALEMIA: ICD-10-CM

## 2022-08-05 PROBLEM — E78.5 HYPERLIPIDEMIA: Status: ACTIVE | Noted: 2022-08-05

## 2022-08-05 LAB
A/G RATIO: 2 (ref 1.1–2.2)
ALBUMIN SERPL-MCNC: 4.7 G/DL (ref 3.4–5)
ALP BLD-CCNC: 111 U/L (ref 40–129)
ALT SERPL-CCNC: 25 U/L (ref 10–40)
ANION GAP SERPL CALCULATED.3IONS-SCNC: 9 MMOL/L (ref 3–16)
AST SERPL-CCNC: 20 U/L (ref 15–37)
BILIRUB SERPL-MCNC: 0.3 MG/DL (ref 0–1)
BUN BLDV-MCNC: 12 MG/DL (ref 7–20)
CALCIUM SERPL-MCNC: 9.1 MG/DL (ref 8.3–10.6)
CHLORIDE BLD-SCNC: 98 MMOL/L (ref 99–110)
CO2: 27 MMOL/L (ref 21–32)
CREAT SERPL-MCNC: 0.8 MG/DL (ref 0.9–1.3)
D DIMER: <0.27 UG/ML FEU (ref 0–0.6)
EKG ATRIAL RATE: 86 BPM
EKG DIAGNOSIS: NORMAL
EKG P AXIS: 64 DEGREES
EKG P-R INTERVAL: 170 MS
EKG Q-T INTERVAL: 366 MS
EKG QRS DURATION: 82 MS
EKG QTC CALCULATION (BAZETT): 437 MS
EKG R AXIS: 49 DEGREES
EKG T AXIS: 40 DEGREES
EKG VENTRICULAR RATE: 86 BPM
GFR AFRICAN AMERICAN: >60
GFR NON-AFRICAN AMERICAN: >60
GLUCOSE BLD-MCNC: 120 MG/DL (ref 70–99)
HCT VFR BLD CALC: 40.1 % (ref 40.5–52.5)
HEMOGLOBIN: 13.8 G/DL (ref 13.5–17.5)
LV EF: 73 %
LVEF MODALITY: NORMAL
MCH RBC QN AUTO: 34 PG (ref 26–34)
MCHC RBC AUTO-ENTMCNC: 34.6 G/DL (ref 31–36)
MCV RBC AUTO: 98.3 FL (ref 80–100)
PDW BLD-RTO: 12.8 % (ref 12.4–15.4)
PLATELET # BLD: 290 K/UL (ref 135–450)
PMV BLD AUTO: 6.6 FL (ref 5–10.5)
POTASSIUM SERPL-SCNC: 3.4 MMOL/L (ref 3.5–5.1)
RBC # BLD: 4.07 M/UL (ref 4.2–5.9)
REASON FOR REJECTION: NORMAL
REJECTED TEST: NORMAL
SODIUM BLD-SCNC: 134 MMOL/L (ref 136–145)
SPECIMEN STATUS: NORMAL
TOTAL PROTEIN: 7.1 G/DL (ref 6.4–8.2)
TROPONIN: <0.01 NG/ML
TROPONIN: <0.01 NG/ML
WBC # BLD: 9.2 K/UL (ref 4–11)

## 2022-08-05 PROCEDURE — A9502 TC99M TETROFOSMIN: HCPCS | Performed by: INTERNAL MEDICINE

## 2022-08-05 PROCEDURE — 78452 HT MUSCLE IMAGE SPECT MULT: CPT

## 2022-08-05 PROCEDURE — 6360000002 HC RX W HCPCS: Performed by: INTERNAL MEDICINE

## 2022-08-05 PROCEDURE — 71045 X-RAY EXAM CHEST 1 VIEW: CPT

## 2022-08-05 PROCEDURE — 6370000000 HC RX 637 (ALT 250 FOR IP): Performed by: INTERNAL MEDICINE

## 2022-08-05 PROCEDURE — 85027 COMPLETE CBC AUTOMATED: CPT

## 2022-08-05 PROCEDURE — 6370000000 HC RX 637 (ALT 250 FOR IP): Performed by: EMERGENCY MEDICINE

## 2022-08-05 PROCEDURE — 99285 EMERGENCY DEPT VISIT HI MDM: CPT

## 2022-08-05 PROCEDURE — 36415 COLL VENOUS BLD VENIPUNCTURE: CPT

## 2022-08-05 PROCEDURE — 2580000003 HC RX 258: Performed by: INTERNAL MEDICINE

## 2022-08-05 PROCEDURE — G0378 HOSPITAL OBSERVATION PER HR: HCPCS

## 2022-08-05 PROCEDURE — 80053 COMPREHEN METABOLIC PANEL: CPT

## 2022-08-05 PROCEDURE — 84484 ASSAY OF TROPONIN QUANT: CPT

## 2022-08-05 PROCEDURE — 93010 ELECTROCARDIOGRAM REPORT: CPT | Performed by: INTERNAL MEDICINE

## 2022-08-05 PROCEDURE — 93017 CV STRESS TEST TRACING ONLY: CPT

## 2022-08-05 PROCEDURE — 6370000000 HC RX 637 (ALT 250 FOR IP)

## 2022-08-05 PROCEDURE — 3430000000 HC RX DIAGNOSTIC RADIOPHARMACEUTICAL: Performed by: INTERNAL MEDICINE

## 2022-08-05 PROCEDURE — 85379 FIBRIN DEGRADATION QUANT: CPT

## 2022-08-05 PROCEDURE — 93005 ELECTROCARDIOGRAM TRACING: CPT | Performed by: EMERGENCY MEDICINE

## 2022-08-05 RX ORDER — ONDANSETRON 2 MG/ML
4 INJECTION INTRAMUSCULAR; INTRAVENOUS EVERY 4 HOURS PRN
Status: DISCONTINUED | OUTPATIENT
Start: 2022-08-05 | End: 2022-08-05 | Stop reason: HOSPADM

## 2022-08-05 RX ORDER — POTASSIUM CHLORIDE 20 MEQ/1
40 TABLET, EXTENDED RELEASE ORAL ONCE
Status: COMPLETED | OUTPATIENT
Start: 2022-08-05 | End: 2022-08-05

## 2022-08-05 RX ORDER — PANTOPRAZOLE SODIUM 40 MG/1
40 TABLET, DELAYED RELEASE ORAL
Status: DISCONTINUED | OUTPATIENT
Start: 2022-08-05 | End: 2022-08-05 | Stop reason: HOSPADM

## 2022-08-05 RX ORDER — NITROGLYCERIN 0.4 MG/1
0.4 TABLET SUBLINGUAL EVERY 5 MIN PRN
Status: DISCONTINUED | OUTPATIENT
Start: 2022-08-05 | End: 2022-08-05 | Stop reason: HOSPADM

## 2022-08-05 RX ORDER — HYDROCHLOROTHIAZIDE 25 MG/1
25 TABLET ORAL DAILY
Status: DISCONTINUED | OUTPATIENT
Start: 2022-08-05 | End: 2022-08-05 | Stop reason: HOSPADM

## 2022-08-05 RX ORDER — ACETAMINOPHEN 650 MG/1
650 SUPPOSITORY RECTAL EVERY 4 HOURS PRN
Status: DISCONTINUED | OUTPATIENT
Start: 2022-08-05 | End: 2022-08-05 | Stop reason: HOSPADM

## 2022-08-05 RX ORDER — POLYETHYLENE GLYCOL 3350 17 G/17G
17 POWDER, FOR SOLUTION ORAL DAILY PRN
Status: DISCONTINUED | OUTPATIENT
Start: 2022-08-05 | End: 2022-08-05 | Stop reason: HOSPADM

## 2022-08-05 RX ORDER — SODIUM CHLORIDE 9 MG/ML
INJECTION, SOLUTION INTRAVENOUS PRN
Status: DISCONTINUED | OUTPATIENT
Start: 2022-08-05 | End: 2022-08-05 | Stop reason: HOSPADM

## 2022-08-05 RX ORDER — ENOXAPARIN SODIUM 100 MG/ML
30 INJECTION SUBCUTANEOUS 2 TIMES DAILY
Status: DISCONTINUED | OUTPATIENT
Start: 2022-08-05 | End: 2022-08-05 | Stop reason: HOSPADM

## 2022-08-05 RX ORDER — LISINOPRIL 20 MG/1
20 TABLET ORAL DAILY
Status: DISCONTINUED | OUTPATIENT
Start: 2022-08-05 | End: 2022-08-05 | Stop reason: HOSPADM

## 2022-08-05 RX ORDER — ATORVASTATIN CALCIUM 10 MG/1
20 TABLET, FILM COATED ORAL DAILY
Status: DISCONTINUED | OUTPATIENT
Start: 2022-08-05 | End: 2022-08-05 | Stop reason: HOSPADM

## 2022-08-05 RX ORDER — PANTOPRAZOLE SODIUM 40 MG/1
40 TABLET, DELAYED RELEASE ORAL
Qty: 30 TABLET | Refills: 1 | Status: SHIPPED | OUTPATIENT
Start: 2022-08-06 | End: 2022-08-16

## 2022-08-05 RX ORDER — SODIUM CHLORIDE 0.9 % (FLUSH) 0.9 %
10 SYRINGE (ML) INJECTION PRN
Status: DISCONTINUED | OUTPATIENT
Start: 2022-08-05 | End: 2022-08-05 | Stop reason: HOSPADM

## 2022-08-05 RX ORDER — SODIUM CHLORIDE 0.9 % (FLUSH) 0.9 %
10 SYRINGE (ML) INJECTION EVERY 12 HOURS SCHEDULED
Status: DISCONTINUED | OUTPATIENT
Start: 2022-08-05 | End: 2022-08-05 | Stop reason: HOSPADM

## 2022-08-05 RX ORDER — ASPIRIN 325 MG
325 TABLET ORAL ONCE
Status: COMPLETED | OUTPATIENT
Start: 2022-08-05 | End: 2022-08-05

## 2022-08-05 RX ORDER — SODIUM CHLORIDE 9 MG/ML
INJECTION, SOLUTION INTRAVENOUS CONTINUOUS
Status: DISCONTINUED | OUTPATIENT
Start: 2022-08-05 | End: 2022-08-05 | Stop reason: HOSPADM

## 2022-08-05 RX ORDER — LISINOPRIL AND HYDROCHLOROTHIAZIDE 25; 20 MG/1; MG/1
1 TABLET ORAL DAILY
Status: DISCONTINUED | OUTPATIENT
Start: 2022-08-05 | End: 2022-08-05 | Stop reason: CLARIF

## 2022-08-05 RX ORDER — CALCIUM CARBONATE 200(500)MG
1000 TABLET,CHEWABLE ORAL 3 TIMES DAILY PRN
Status: DISCONTINUED | OUTPATIENT
Start: 2022-08-05 | End: 2022-08-05 | Stop reason: HOSPADM

## 2022-08-05 RX ORDER — ACETAMINOPHEN 325 MG/1
650 TABLET ORAL EVERY 4 HOURS PRN
Status: DISCONTINUED | OUTPATIENT
Start: 2022-08-05 | End: 2022-08-05 | Stop reason: HOSPADM

## 2022-08-05 RX ADMIN — ANTACID TABLETS 1000 MG: 500 TABLET, CHEWABLE ORAL at 06:11

## 2022-08-05 RX ADMIN — Medication 10 ML: at 09:02

## 2022-08-05 RX ADMIN — ASPIRIN 325 MG: 325 TABLET ORAL at 03:30

## 2022-08-05 RX ADMIN — ACETAMINOPHEN 650 MG: 325 TABLET, FILM COATED ORAL at 15:46

## 2022-08-05 RX ADMIN — ATORVASTATIN CALCIUM 20 MG: 10 TABLET, FILM COATED ORAL at 09:02

## 2022-08-05 RX ADMIN — TETROFOSMIN 10.8 MILLICURIE: 1.38 INJECTION, POWDER, LYOPHILIZED, FOR SOLUTION INTRAVENOUS at 12:09

## 2022-08-05 RX ADMIN — LISINOPRIL 20 MG: 20 TABLET ORAL at 09:02

## 2022-08-05 RX ADMIN — PANTOPRAZOLE SODIUM 40 MG: 40 TABLET, DELAYED RELEASE ORAL at 09:02

## 2022-08-05 RX ADMIN — POTASSIUM CHLORIDE 40 MEQ: 20 TABLET, EXTENDED RELEASE ORAL at 04:54

## 2022-08-05 RX ADMIN — SODIUM CHLORIDE: 9 INJECTION, SOLUTION INTRAVENOUS at 06:11

## 2022-08-05 RX ADMIN — HYDROCHLOROTHIAZIDE 25 MG: 25 TABLET ORAL at 09:02

## 2022-08-05 RX ADMIN — REGADENOSON 0.4 MG: 0.08 INJECTION, SOLUTION INTRAVENOUS at 13:51

## 2022-08-05 RX ADMIN — TETROFOSMIN 30 MILLICURIE: 1.38 INJECTION, POWDER, LYOPHILIZED, FOR SOLUTION INTRAVENOUS at 13:50

## 2022-08-05 ASSESSMENT — PAIN - FUNCTIONAL ASSESSMENT: PAIN_FUNCTIONAL_ASSESSMENT: 0-10

## 2022-08-05 ASSESSMENT — PAIN SCALES - GENERAL
PAINLEVEL_OUTOF10: 3
PAINLEVEL_OUTOF10: 5

## 2022-08-05 NOTE — ED NOTES
Report called to nurse assuming inpatient care and responsibility for patient. All questions answered per protocol. Nurse receiving report expresses no further concerns or questions. MUSE Score completed at this time. Transport to be completed by inpatient nurse.        Andrae Pinto RN  08/05/22 6085

## 2022-08-05 NOTE — PROGRESS NOTES
A Jennifer stress test was completed on this patient as ordered. The patient tolerated the procedure well. Awaiting stress imaging at this time.  Jennifer

## 2022-08-05 NOTE — H&P
Hospital Medicine  History and Physical    PCP: Hardik An MD  Patient Name: Wing December    Date of Service: Pt seen/examined on 8/5/22 and placed in observation. CHIEF COMPLAINT:  Pt c/o chest pain, shortness of breath  HISTORY OF PRESENT ILLNESS: Pt is an 45y.o. year-old male with a history of hypertension, hyperlipidemia, non-morbid obesity and panic attacks. He presents to the ER for evaluation after an acute onset of chest pain. He was sitting on his sofa talking with his Son when he developed pressure-like pain to the left of his precordial area. Shortly after that he developed some shortness of breath but believes that may have been secondary to anxiety/panic attack. The chest pain involved the lateral aspect of his chest but otherwise did not radiate. In the ER his initial Troponin was not elevated and his EKG did not have ischemic changes. He had a normal nuclear stress test on 06/05/2020 and has not had a cardiac evaluation since that time. He is currently symptom-free. Associated signs and symptoms do not include typical chest pain, lightheaded, dizziness, diaphoresis, edema, orthopnea, paroxysmal nocturnal dyspnea, fever or chills. No recent medication changes. He is being admitted for further evaluation and treatment. Past Medical History:        Diagnosis Date    Anxiety     Chronic back  and neck pain     Degenerative disc disease     Diabetes mellitus (HCC)     prediabetic    Diverticulosis     Fatty liver     H/O renal calculi     Hyperlipidemia     Hypertension     MDD (major depressive disorder)     Obesity     Prediabetes     Pure hypercholesterolemia     Scoliosis     Tobacco dependence        Past Surgical History:        Procedure Laterality Date    KIDNEY STONE SURGERY         Allergies:  Ciprofloxacin    Medications Prior to Admission:    Prior to Admission medications    Medication Sig Start Date End Date Taking?  Authorizing Provider   atorvastatin (LIPITOR) 20 auscultation bilaterally and no use of accessory muscles  Heart: regular rate and rhythm, S1, S2 normal, no murmur, click, rub or gallop and normal apical impulse  Abdomen: soft, non-tender; bowel sounds normal; no masses, no organomegaly  Extremities: extremities atraumatic, no cyanosis or edema and Homans sign is negative, no sign of DVT. Capillary Refill: Acceptable < 3 seconds   Peripheral Pulses: +3 easily felt, not easily obliterated with pressures   Skin: Skin color, texture, turgor normal. No rashes or lesions on exposed skin  Neurologic: Neurovascularly intact without any focal sensory/motor deficits. Cranial nerves: II-XII intact, grossly non-focal. Gait was not tested. Mental Status: Alert and oriented, thought content appropriate, normal insight        CBC:   Recent Labs     08/05/22  0343   WBC 9.2   HGB 13.8        BMP:    Recent Labs     08/05/22  0315   *   K 3.4*   CL 98*   CO2 27   BUN 12   CREATININE 0.8*   GLUCOSE 120*     Troponin:   Recent Labs     08/05/22  0315   TROPONINI <0.01     PT/INR:  No results found for: PTINR  U/A:    Lab Results   Component Value Date/Time    LEUKOCYTESUR Negative 05/07/2022 07:53 AM    NITRITE neg 01/18/2012 08:01 PM    RBCUA 11-20 05/07/2022 07:53 AM    SPECGRAV >=1.030 05/07/2022 07:53 AM    UROBILINOGEN 0.2 05/07/2022 07:53 AM    BILIRUBINUR Negative 05/07/2022 07:53 AM    BILIRUBINUR neg 01/18/2012 08:01 PM    BILIRUBINUR NEGATIVE 01/18/2012 09:05 AM    BLOODU MODERATE 05/07/2022 07:53 AM    GLUCOSEU Negative 05/07/2022 07:53 AM    GLUCOSEU NEGATIVE 01/18/2012 09:05 AM    PROTEINU Negative 05/07/2022 07:53 AM         RAD:   I have independently reviewed and interpreted the imaging studies below and based my recommendations to the patient on those findings.     XR CHEST PORTABLE    Result Date: 8/5/2022  EXAMINATION: ONE XRAY VIEW OF THE CHEST 8/5/2022 3:04 am COMPARISON: 10/10/2015 HISTORY: ORDERING SYSTEM PROVIDED HISTORY: Chest Pain TECHNOLOGIST PROVIDED HISTORY: Reason for exam:->Chest Pain Reason for Exam: Chest Pain (Had COVID 2 weeks ago started 45 min ago) FINDINGS: Cardiac and mediastinal silhouettes appear within normal limits for size. Pulmonary vascularity is normal.  No parenchymal infiltrate, pleural effusion, pneumothorax, or pulmonary edema. No acute osseous abnormality. Osseous structures of the chest appear stable. Clear chest without acute cardiopulmonary process. EKG:   Read by ER in the absence of a Cardiologist shows  normal sinus rhythm with a rate of 86  Axis is   Normal  QTc is  within an acceptable range  Intervals and Durations are unremarkable. No specific ST-T wave changes appreciated. No evidence of acute ischemia. Nonspecific ST changes 2, 3, V2, V3, not significantly different than prior EKG dated May 19, 2022         Assessment:   Principal Problem:    Chest pain  Active Problems:    Hyperlipidemia    Panic attacks    Non morbid obesity due to excess calories    Essential hypertension  Resolved Problems:    * No resolved hospital problems. *      Plan:     Pt reports that he had COVID 19 2-weeks ago and has not felt normal since that time    Chest pain - Evaluation in the ER included an EKG that did not have acute ischemic changes and an initial Troponin which was not elevated. Patient will be admitted and monitored on telemetry, and we will check serial cardiac enzymes. Aspirin was started in the Emergency Room. Cardiac testing has been ordered for risk stratification. I have discussed other possible etiologies of the symptoms such as Musculoskeletal Pain and GERD, including unique presenting characteritics of each. Patient understands that if the evaluation does not show that the symptoms are secondary to ischemic changes, he will be discharged and instructed to follow up with his outpatient physician to help determine the etiology of his symptoms.      Panic attacks - Pt believes that he may have had a mild panic attack manifesting as shortness of breath when his chest pain started, but is currently symptom free. Will monitor    Essential (primary) hypertension - continue home meds and monitor blood pressure    Hyperlipidemia - No current evidence of Rhabdomyolysis or other adverse effects. Continue statin therapy while in the hospital    Non-morbid obesity due to excess calories (Body mass index is 33.91 kg/m². ) - Complicating assessment and treatment. Placing patient at risk for multiple co-morbidities as well as early death and contributing to the patient's presentation. Code Status: Full code  Diet: NPO  DVT Prophylaxis: Lovenox    (Advanced care planning has been discussed with patient and/or responsible family member and is reflected in the code status.  Further orders associated with this have been entered if appropriate)      Disposition: Anticipate that patient will remain in the hospital for less than 2 midnights depending on further evaluation and clinical course    Please note that over 50 minutes was spent in evaluating the patient, review of records and results, discussion with staff/family, etc.      Sheyla Short MD

## 2022-08-05 NOTE — DISCHARGE INSTRUCTIONS
Chest Pain: Care Instructions  Your Care Instructions     There are many things that can cause chest pain. Some are not serious and will get better on their own in a few days. But some kinds of chest pain need more testing and treatment. Your doctor may have recommended a follow-up visit in the next 8 to 12 hours. If you are not getting better, you may need more testsor treatment. Even though your doctor has released you, you still need to watch for any problems. The doctor carefully checked you, but sometimes problems can develop later. If you have new symptoms or if your symptoms do not get better, getmedical care right away. If you have worse or different chest pain or pressure that lasts more than 5 minutes or you passed out (lost consciousness), call 911 or seek other emergency help right away. A medical visit is only one step in your treatment. Even if you feel better, you still need to do what your doctor recommends, such as going to all suggested follow-up appointments and taking medicines exactly as directed. Thiswill help you recover and help prevent future problems. How can you care for yourself at home? Rest until you feel better. Take your medicine exactly as prescribed. Call your doctor if you think you are having a problem with your medicine. Do not drive after taking a prescription pain medicine. When should you call for help? Call 911 if: You passed out (lost consciousness). You have severe difficulty breathing. You have symptoms of a heart attack. These may include:  Chest pain or pressure, or a strange feeling in your chest.  Sweating. Shortness of breath. Nausea or vomiting. Pain, pressure, or a strange feeling in your back, neck, jaw, or upper belly or in one or both shoulders or arms. Lightheadedness or sudden weakness. A fast or irregular heartbeat. After you call 911, the  may tell you to chew 1 adult-strength or 2 to 4 low-dose aspirin.  Wait for an ambulance. Do not try to drive yourself. Call your doctor today if:     You have any trouble breathing. Your chest pain gets worse. You are dizzy or lightheaded, or you feel like you may faint. You are not getting better as expected. You are having new or different chest pain. Where can you learn more? Go to [email or web address] and sign in to your [employer] account. Enter A120 in the [employer] box to learn more about \"Chest Pain: Care Instructions. \"     If you do not have an account, please click on the \"Sign Up Now\" link. Current as of: March 9, 2022               Content Version: 13.3  © 2006-2022 [employer]wise, Incorporated. Care instructions adapted under license by [facility]. If you have questions about a medical condition or this instruction, always ask your healthcare professional. [address] any warranty or liability for your use of this information.

## 2022-08-05 NOTE — ED NOTES
Purple lab tube redrawn and sent to lab per lab's request. Sent via tube system.      Robert Feliz RN  08/05/22 9805

## 2022-08-05 NOTE — DISCHARGE SUMMARY
Discharge Summary    Patient ID: Oscar Tabor      Patient's PCP: Franci Mendosa MD    Admit Date: 8/5/2022     Discharge Date:   8/5/2022    Admitting Physician: Amanda Tobias MD     Discharge Physician: Marissa Horton MD     Discharge Diagnoses: Active Hospital Problems    Diagnosis     Chest pain [R07.9]      Priority: Medium    Hyperlipidemia [E78.5]      Priority: Medium    Essential hypertension [I10]     Non morbid obesity due to excess calories [E66.09]        The patient was seen and examined on day of discharge and this discharge summary is in conjunction with any daily progress note from day of discharge. Hospital Course: Fer Rivera is a 54-year-old male with a history of hypertension, hyperlipidemia, panic attacks, and GERD who presented to the hospital with chest pain. The chest pain involved the lateral aspect of his chest, did not radiate elsewhere. He describes the pain as more of a uncomfortable pressure as opposed to a sharp pain. Shifting positions would make the pain either feel worse or feel better, if he lays on his left side the pain worsened. ED work-up for ACS rule out was done, EKG obtained showing normal sinus rhythm, troponin trend negative, chest x-ray negative. Patient was admitted observation status. Patient had nuclear medicine stress test done to rule out any stable unstable angina. The stress test showedSummary   Normal LV size and systolic function. Left ventricular ejection fraction of   73%. Normal wall motion. There is normal isotope uptake at stress and rest. There is no evidence of myocardial ischemia or scar. Patient reports having a history of GERD in the past, he has tried Pepcid and Tums in the past which usually work. He was concerned because this chest pain would not go away with Tums and he has not tried Pepcid for over a few months.   Patient affirmed having some of the stool/burning chest pain this morning, he was able to sleep and eat breakfast.  Patient given Protonix in the morning before stress test, it seems to have helped as patient without pain in the afternoon. Patient given Protonix prescription at discharge, follow-up with PCP for further management. Physical Exam Performed:   /73   Pulse 67   Temp 97.9 °F (36.6 °C) (Oral)   Resp 16   Ht 6' (1.829 m)   Wt 250 lb (113.4 kg)   SpO2 95%   BMI 33.91 kg/m²       General appearance:  No apparent distress, appears stated age and cooperative. HEENT:  Normal cephalic, atraumatic without obvious deformity. Pupils equal, round, and reactive to light. Extra ocular muscles intact. Conjunctivae/corneas clear. Neck: Supple, with full range of motion. No jugular venous distention. Trachea midline. Respiratory:  Normal respiratory effort. Clear to auscultation, bilaterally without Rales/Wheezes/Rhonchi. Cardiovascular:  Regular rate and rhythm with normal S1/S2 without murmurs, rubs or gallops. Abdomen: Soft, non-tender, non-distended with normal bowel sounds. Musculoskeletal:  No clubbing, cyanosis or edema bilaterally. Full range of motion without deformity. Skin: Skin color, texture, turgor normal.  No rashes or lesions. Neurologic:  Neurovascularly intact without any focal sensory/motor deficits. Cranial nerves: II-XII intact, grossly non-focal.  Psychiatric:  Alert and oriented, thought content appropriate, normal insight  Capillary Refill: Brisk,< 3 seconds   Peripheral Pulses: +2 palpable, equal bilaterally       Labs:  For convenience and continuity at follow-up the following most recent labs are provided:      CBC:    Lab Results   Component Value Date/Time    WBC 9.2 08/05/2022 03:43 AM    HGB 13.8 08/05/2022 03:43 AM    HCT 40.1 08/05/2022 03:43 AM     08/05/2022 03:43 AM       Renal:    Lab Results   Component Value Date/Time     08/05/2022 03:15 AM    K 3.4 08/05/2022 03:15 AM    K 3.8 05/19/2022 12:55 AM    CL 98 08/05/2022 03:15 AM CO2 27 08/05/2022 03:15 AM    BUN 12 08/05/2022 03:15 AM    CREATININE 0.8 08/05/2022 03:15 AM    CALCIUM 9.1 08/05/2022 03:15 AM    PHOS 3.9 07/08/2017 12:52 AM         Significant Diagnostic Studies    Radiology:   NM Cardiac Stress Test Nuclear Imaging   Final Result      XR CHEST PORTABLE   Final Result   Clear chest without acute cardiopulmonary process. Consults:   None    Disposition:  Home     Condition at Discharge: Stable    Discharge Instructions/Follow-up:  Follow up with PCP in 1-2 weeks. Prescription for Protonix sent to [facility] in [place]. Code Status:  Full Code     Activity: activity as tolerated    Diet: regular diet      Discharge Medications:     Current Discharge Medication List             Details   pantoprazole (PROTONIX) 40 MG tablet Take 1 tablet by mouth every morning (before breakfast)  Qty: 30 tablet, Refills: 1                Details   atorvastatin (LIPITOR) 20 MG tablet Take 1 tablet by mouth in the morning. Qty: 90 tablet, Refills: 3    Associated Diagnoses: Risk of myocardial infarction or stroke 7.5% or greater in next 10 years      nicotine (NICODERM CQ) 14 MG/24HR PLACE 1 PATCH ONTO THE SKIN EVERY 24 HOURS. Qty: 28 patch, Refills: 2      lisinopril-hydroCHLOROthiazide (PRINZIDE;ZESTORETIC) 20-25 MG per tablet TAKE 1 TABLET BY MOUTH EVERY DAY  Qty: 90 tablet, Refills: 1             Time Spent on discharge is more than 1.5 hours in the examination, evaluation, counseling and review of medications and discharge plan. Signed:    Taryn Coronel DO   8/5/2022      Thank you Stu Urrutia MD for the opportunity to be involved in this patient's care. If you have any questions or concerns please feel free to contact me at [phone]. Addendum to Resident DC summary note:  Pt seen,examined and evaluated with resident and discussed regarding POC .  I have reviewed the current history, physical findings, labs and assessment and plan , edited the note where appropriate and agree with note as documented by resident DO ( Dr. Mckenna Reed)    By problem list  Chest pain - Evaluation in the ER included an EKG that did not have acute ischemic changes and serial troponin negative . ACS ruled out. NM stress test negative. Patient's symptoms most likely are secondary to his past history of GERD. Patient reports using Tums/Pepcid in the past and currently not on those medications. Discussed with patient regarding care plan and started on PPI to be continued for at least 4 to 6 weeks. Patient advised about lifestyle changes including diet and exercise. Recommend follow-up with PCP if symptoms does not kristy. Panic attacks - Pt believes that he may have had a mild panic attack manifesting as shortness of breath when his chest pain started, but is currently symptom free. Essential (primary) hypertension - continue home meds and monitor blood pressure     Hyperlipidemia - No current evidence of Rhabdomyolysis or other adverse effects. Continue statin therapy while in the hospital     Non-morbid obesity due to excess calories (Body mass index is 33.91 kg/m². ) - Complicating assessment and treatment. Placing patient at risk for multiple co-morbidities as well as early death and contributing to the patient's presentation. Patient informed about following up with appointments. Patient verbalized understanding for follow-up appointments. The patient was informed of the results of tests, a time was given to answer questions, a plan was proposed and he agreed with plan. Medical reconciliation performed. Patient discharged stable condition. On the date of discharge, the patient reported feeling stable. The patient was found to not be in any acute distress, with vital signs within normal limits, and no new abnormalities on physical examination.  Further, the patient expressed appropriate understanding of, and agreement with, the discharge recommendations,

## 2022-08-05 NOTE — ED PROVIDER NOTES
CHIEF COMPLAINT  Chest Pain (Had COVID 2 weeks ago started 45 min ago)      HISTORY OF PRESENT ILLNESS  Dat Irby is a 45 y.o. male with a history of prediabetes, hyperlipidemia, hypertension, obesity, tobacco use presenting for evaluation of chest pain. Patient states that chest pain started about an hour prior to arrival.  He states its on the left side of his chest feels like a heaviness. Denies any radiation to the arm, back or abdomen. He did state that he is mildly short of breath when this started. He was otherwise at rest.  No particular exacerbating factors. He states that he was diagnosed with COVID about 2 weeks ago. Denies any leg swelling. He has not had prior diagnosis of CAD. He did have a stress test 1 or 2 years ago that he reports was normal.  His mother had cardiac disease starting in her 46s. No other complaints, modifying factors or associated symptoms. I have reviewed the following from the nursing documentation.    Past Medical History:   Diagnosis Date    Anxiety     Chronic back  and neck pain     Degenerative disc disease     Diabetes mellitus (HCC)     prediabetic    Diverticulosis     Fatty liver     H/O renal calculi     Hyperlipidemia     Hypertension     MDD (major depressive disorder)     Obesity     Prediabetes     Pure hypercholesterolemia     Scoliosis     Tobacco dependence      Past Surgical History:   Procedure Laterality Date    KIDNEY STONE SURGERY       Family History   Problem Relation Age of Onset    Heart Disease Mother     Arrhythmia Mother     Diabetes Mother     Leukemia Father     Diabetes Maternal Grandmother     No Known Problems Paternal Grandmother     No Known Problems Paternal Grandfather      Social History     Socioeconomic History    Marital status:      Spouse name: Blake Cody    Number of children: 4    Years of education: Not on file    Highest education level: Not on file   Occupational History    Occupation: bar mgr   Tobacco Use Smoking status: Every Day     Packs/day: 0.50     Years: 25.00     Pack years: 12.50     Types: Cigarettes    Smokeless tobacco: Never    Tobacco comments:     down to 8/day as of 5/26/22   Vaping Use    Vaping Use: Never used   Substance and Sexual Activity    Alcohol use: Yes     Comment: 3 drinks every other weekend    Drug use: No    Sexual activity: Yes     Partners: Female   Other Topics Concern    Not on file   Social History Narrative    Not on file     Social Determinants of Health     Financial Resource Strain: Low Risk     Difficulty of Paying Living Expenses: Not hard at all   Food Insecurity: No Food Insecurity    Worried About Running Out of Food in the Last Year: Never true    Ran Out of Food in the Last Year: Never true   Transportation Needs: No Transportation Needs    Lack of Transportation (Medical): No    Lack of Transportation (Non-Medical): No   Physical Activity: Not on file   Stress: Not on file   Social Connections: Not on file   Intimate Partner Violence: Not on file   Housing Stability: Not on file     Current Facility-Administered Medications   Medication Dose Route Frequency Provider Last Rate Last Admin    potassium chloride (KLOR-CON M) extended release tablet 40 mEq  40 mEq Oral Once Amanda Hodges MD        nitroGLYCERIN (NITROSTAT) SL tablet 0.4 mg  0.4 mg SubLINGual Q5 Min PRN Amanda Hodges MD         Current Outpatient Medications   Medication Sig Dispense Refill    atorvastatin (LIPITOR) 20 MG tablet Take 1 tablet by mouth in the morning. 90 tablet 3    escitalopram (LEXAPRO) 5 MG tablet TAKE 1 TABLET BY MOUTH EVERY DAY (Patient not taking: Reported on 7/19/2022) 30 tablet 0    nicotine (NICODERM CQ) 14 MG/24HR PLACE 1 PATCH ONTO THE SKIN EVERY 24 HOURS.  28 patch 2    diclofenac (VOLTAREN) 75 MG EC tablet Take 1 tablet by mouth 2 times daily as needed for Pain 10 tablet 0    lisinopril-hydroCHLOROthiazide (PRINZIDE;ZESTORETIC) 20-25 MG per tablet TAKE 1 TABLET BY MOUTH EVERY DAY 90 tablet 1     Allergies   Allergen Reactions    Ciprofloxacin Itching       REVIEW OF SYSTEMS  Positive and pertinent negatives as per HPI. All other systems were reviewed and are negative. PHYSICAL EXAM  /70   Pulse 72   Temp 97.9 °F (36.6 °C) (Oral)   Resp 13   Ht 6' (1.829 m)   Wt 250 lb (113.4 kg)   SpO2 96%   BMI 33.91 kg/m²   GENERAL APPEARANCE: Awake and alert. Cooperative. HEAD: Normocephalic. Atraumatic. HEART: RRR. No harsh murmurs. Intact radial pulses 2+ bilaterally. LUNGS: Respirations unlabored without accessory muscle use. CTAB. Good air exchange. No wheezes, rales, or rhonchi. Speaking comfortably in full sentences. ABDOMEN: Soft. Non-distended. Non-tender. No guarding or rebound. EXTREMITIES: No peripheral edema. No acute deformities. SKIN: Warm and dry. No acute rashes. NEUROLOGICAL: Alert and oriented X 3. No focal deficits    LABS  I have reviewed all labs for this visit.    Results for orders placed or performed during the hospital encounter of 08/05/22   Comprehensive Metabolic Panel   Result Value Ref Range    Sodium 134 (L) 136 - 145 mmol/L    Potassium 3.4 (L) 3.5 - 5.1 mmol/L    Chloride 98 (L) 99 - 110 mmol/L    CO2 27 21 - 32 mmol/L    Anion Gap 9 3 - 16    Glucose 120 (H) 70 - 99 mg/dL    BUN 12 7 - 20 mg/dL    Creatinine 0.8 (L) 0.9 - 1.3 mg/dL    GFR Non-African American >60 >60    GFR African American >60 >60    Calcium 9.1 8.3 - 10.6 mg/dL    Total Protein 7.1 6.4 - 8.2 g/dL    Albumin 4.7 3.4 - 5.0 g/dL    Albumin/Globulin Ratio 2.0 1.1 - 2.2    Total Bilirubin 0.3 0.0 - 1.0 mg/dL    Alkaline Phosphatase 111 40 - 129 U/L    ALT 25 10 - 40 U/L    AST 20 15 - 37 U/L   Troponin   Result Value Ref Range    Troponin <0.01 <0.01 ng/mL   D-Dimer, Quantitative   Result Value Ref Range    D-Dimer, Quant <0.27 0.00 - 0.60 ug/mL FEU   SPECIMEN REJECTION   Result Value Ref Range    Rejected Test CBCND     Reason for Rejection see below    CBC   Result Value Ref Range    WBC 9.2 4.0 - 11.0 K/uL    RBC 4.07 (L) 4.20 - 5.90 M/uL    Hemoglobin 13.8 13.5 - 17.5 g/dL    Hematocrit 40.1 (L) 40.5 - 52.5 %    MCV 98.3 80.0 - 100.0 fL    MCH 34.0 26.0 - 34.0 pg    MCHC 34.6 31.0 - 36.0 g/dL    RDW 12.8 12.4 - 15.4 %    Platelets 195 759 - 553 K/uL    MPV 6.6 5.0 - 10.5 fL   EKG 12 Lead   Result Value Ref Range    Ventricular Rate 87 BPM    Atrial Rate 87 BPM    P-R Interval 146 ms    QRS Duration 76 ms    Q-T Interval 352 ms    QTc Calculation (Bazett) 423 ms    P Axis 36 degrees    R Axis 44 degrees    T Axis 31 degrees    Diagnosis       Normal sinus rhythmNormal ECGWhen compared with ECG of 19-MAY-2022 00:48,No significant change was found       EKG  The Ekg interpreted by myself in the emergency department in the absence of a cardiologist.  normal sinus rhythm with a rate of 86  Axis is   Normal  QTc is  within an acceptable range  Intervals and Durations are unremarkable. No specific ST-T wave changes appreciated. No evidence of acute ischemia. Nonspecific ST changes 2, 3, V2, V3, not significantly different than prior EKG dated May 19, 2022      RADIOLOGY  X-RAYS:  I have reviewed radiologic plain film image(s). ALL OTHER NON-PLAIN FILM IMAGES SUCH AS CT, ULTRASOUND AND MRI HAVE BEEN READ BY THE RADIOLOGIST. XR CHEST PORTABLE   Final Result   Clear chest without acute cardiopulmonary process. No results found. During the patient's ED course, the patient was given:  Medications   potassium chloride (KLOR-CON M) extended release tablet 40 mEq (has no administration in time range)   nitroGLYCERIN (NITROSTAT) SL tablet 0.4 mg (has no administration in time range)   aspirin tablet 325 mg (325 mg Oral Given 8/5/22 0330)        ED COURSE/MDM  Patient seen and evaluated. Old records reviewed. Labs and imaging reviewed and results discussed with patient. 50-year-old male presenting for evaluation of chest pain.   Patient arrives hypertensive otherwise stable vital signs. He continues to have chest pain and describes it as dull sensation over the lateral and anterior portion of the left chest.  He has intact distal pulses EKG is nonischemic. ED evaluation clued basic labs, cardiac panel, dimer to screen for PE in the setting of recent COVID diagnosis. Chest x-ray without focal process. Patient does have a moderately elevated heart score secondary to age, risk factors, history of 5. I have recommended admission for further risk stratification with concerns for ACS. Laboratory evaluation revealed initial negative troponin, mild hypokalemia. Because of moderate risk heart score and his symptoms, patient was recommended for admission for further risk stratification with concerns for ACS. Patient is agreeable with this plan. Admitted to hospitalist in stable condition. CLINICAL IMPRESSION  1. Chest pain, unspecified type    2. Hypokalemia        Blood pressure 113/70, pulse 72, temperature 97.9 °F (36.6 °C), temperature source Oral, resp. rate 13, height 6' (1.829 m), weight 250 lb (113.4 kg), SpO2 96 %. [address] [place] [address] was admitted in stable condition. This chart was generated in part by using Dragon Dictation system and may contain errors related to that system including errors in grammar, punctuation, and spelling, as well as words and phrases that may be inappropriate. If there are any questions or concerns please feel free to contact the dictating provider for clarification.        Nedra Sen MD  08/05/22 1058

## 2022-08-05 NOTE — PROGRESS NOTES
Patient admitted to room 0352 from ED. VSS. Patient oriented to room, call light, bed rails, phone, lights and bathroom. Telemetry box in place, patient aware of placement and reason. Bed locked, in lowest position, side rails up 2/4, call light within reach. Will continue to monitor.  Sruthi Osborne RN

## 2022-08-08 ENCOUNTER — CARE COORDINATION (OUTPATIENT)
Dept: CASE MANAGEMENT | Age: 52
End: 2022-08-08

## 2022-08-08 DIAGNOSIS — I10 ESSENTIAL HYPERTENSION: Primary | ICD-10-CM

## 2022-08-08 NOTE — CARE COORDINATION
Sue [facility] Transitions Initial Follow Up Call    Call within 2 business days of discharge: Yes    Patient: Wing December Patient : 1970   MRN: [id number]  Reason for Admission: chest pain  Discharge Date: 22 RARS: No data recorded    Last Discharge [facility]       Date Complaint Diagnosis Description Type Department Provider    22 Chest Pain Chest pain, unspecified type . .. ED to Hosp-Admission (Discharged) (Vamsi Paiz) Skye Vaz MD; Jenni Mclean. .. Spoke with: Vinnie Walters 40: NILAM    Non-face-to-face services provided:  Obtained and reviewed discharge summary and/or continuity of care documents    Transitions of Care Initial Call  Challenges to be reviewed by the provider   Additional needs identified to be addressed with provider: No  none             Method of communication with provider : none    Advance Care Planning:   Does patient have an Advance Directive: not on file. Care Transition Nurse contacted the patient by telephone to perform post hospital discharge assessment. Verified name and  with patient as identifiers. Provided introduction to self, and explanation of the CTN role. CTN reviewed discharge instructions, medical action plan and red flags with patient who verbalized understanding. Patient given an opportunity to ask questions and does not have any further questions or concerns at this time. Were discharge instructions available to patient? Yes. Reviewed appropriate site of care based on symptoms and resources available to patient including: PCP  Specialist. The patient agrees to contact the PCP office for questions related to their healthcare. Medication reconciliation was performed with patient, who verbalizes understanding of administration of home medications. Advised obtaining a 90-day supply of all daily and as-needed medications. Was patient discharged with a pulse oximeter?  no    Patient answered call and verified . Patient pleasant and agreeable to transition call. Patient denied any further chest pain and happy to be back home. Patient is taking all medication as directed. Medication reconciliation noted in system. Patient declined assistance with follow up visit scheduling. Stated that he messages in [place] and will get appt scheduled. Denied any acute needs at present time. Agreeable to f/u calls. Educated on the use of urgent care or physicians 24 hr access line if assistance is needed after hours. CTN provided contact information. Plan for follow-up call in 5-7 days based on severity of symptoms and risk factors.   Plan for next call: follow up appointment-did patient get follow up visit scheduled        Care Transitions 24 Hour Call    Do you have a copy of your discharge instructions?: Yes  Do you have all of your prescriptions and are they filled?: Yes  Have you been contacted by a [address]?: No  Have you scheduled your follow up appointment?: Yes  How are you going to get to your appointment?: Car - family or friend to transport  Do you feel like you have everything you need to keep you well at home?: Yes  Are you an active caregiver in your home?: No  Care Transitions Interventions         Follow Up  Future Appointments   Date Time Provider Regina Wilcox   10/20/2022  1:00 PM 41 MD [address] Jigar Rosas RN

## 2022-08-15 ENCOUNTER — CARE COORDINATION (OUTPATIENT)
Dept: CASE MANAGEMENT | Age: 52
End: 2022-08-15

## 2022-08-15 NOTE — CARE COORDINATION
Sue [facility] Transitions Follow Up Call    8/15/2022    Patient: Deshawn Abebe  Patient : 1970   MRN: [id number]  Reason for Admission: chest pain  Discharge Date: 22 RARS: No data recorded       Spoke with: na    Spoke to female that stated patient just stepped out for a few minutes and request a call back. CTN scheduled follow up call. Patient answered call and verified . Patient pleasant and agreeable to transition call. Patient has been feeling well and scheduled for post hospitalization visit later today. Patient confirmed transportation to visit. Denied any chest pain or SOB since hospital visit. Confirmed that he is taking all medication as directed. Denied any acute needs at present time. Agreeable to f/u calls. Educated on the use of urgent care or physicians 24 hr access line if assistance is needed after hours. Care Transitions Follow Up Call    Needs to be reviewed by the provider   Additional needs identified to be addressed with provider: No  none             Method of communication with provider : none      Care Transition Nurse contacted the patient by telephone to follow up after admission. Verified name and  with patient as identifiers. Addressed changes since last contact: none  Discussed follow-up appointments. If no appointment was previously scheduled, appointment scheduling offered: Yes. Is follow up appointment scheduled within 7 days of discharge? Yes. CTN reviewed discharge instructions, medical action plan and red flags with patient and discussed any barriers to care and/or understanding of plan of care after discharge. Discussed appropriate site of care based on symptoms and resources available to patient including: PCP. The patient agrees to contact the PCP office for questions related to their healthcare. CTN provided contact information for future needs.  Plan for follow-up call in 7-10 days based on severity of symptoms and risk factors. Care Transitions Subsequent and Final Call    Subsequent and Final Calls  Care Transitions Interventions  Other Interventions:              Follow Up  Future Appointments   Date Time Provider Regina Wilcox   8/15/2022  4[facility]   10/20/2022  1:00 PM Sarah Worley MD [facility] AND [facility]       Bjorn Alvarado RN

## 2022-08-16 ENCOUNTER — OFFICE VISIT (OUTPATIENT)
Dept: PRIMARY CARE CLINIC | Age: 52
End: 2022-08-16
Payer: COMMERCIAL

## 2022-08-16 VITALS
OXYGEN SATURATION: 98 % | BODY MASS INDEX: 35.62 KG/M2 | HEART RATE: 65 BPM | DIASTOLIC BLOOD PRESSURE: 68 MMHG | TEMPERATURE: 97.8 F | WEIGHT: 263 LBS | SYSTOLIC BLOOD PRESSURE: 112 MMHG | HEIGHT: 72 IN

## 2022-08-16 DIAGNOSIS — F41.1 GENERALIZED ANXIETY DISORDER: ICD-10-CM

## 2022-08-16 PROCEDURE — G8427 DOCREV CUR MEDS BY ELIG CLIN: HCPCS | Performed by: FAMILY MEDICINE

## 2022-08-16 PROCEDURE — 4004F PT TOBACCO SCREEN RCVD TLK: CPT | Performed by: FAMILY MEDICINE

## 2022-08-16 PROCEDURE — G8417 CALC BMI ABV UP PARAM F/U: HCPCS | Performed by: FAMILY MEDICINE

## 2022-08-16 PROCEDURE — 99214 OFFICE O/P EST MOD 30 MIN: CPT | Performed by: FAMILY MEDICINE

## 2022-08-16 PROCEDURE — 3017F COLORECTAL CA SCREEN DOC REV: CPT | Performed by: FAMILY MEDICINE

## 2022-08-16 RX ORDER — ESCITALOPRAM OXALATE 5 MG/1
TABLET ORAL
Qty: 30 TABLET | Refills: 0
Start: 2022-08-16

## 2022-08-16 RX ORDER — OMEPRAZOLE 40 MG/1
40 CAPSULE, DELAYED RELEASE ORAL
Qty: 90 CAPSULE | Refills: 0 | Status: SHIPPED | OUTPATIENT
Start: 2022-08-16

## 2022-08-16 NOTE — PROGRESS NOTES
[address] and [facility]                                             [address] [facility], [facility], [address] [place] [id number]        Phone: [phone]      Name:  Jodi Duncan  :    1970    Consultants:   Patient Care Team:  Analilia Pedraza MD as PCP - General (Family Medicine)  Analilia Pedraza MD as PCP - [facility] Empaneled Provider  Marcellina Crews, RN as Care Transitions Nurse    Chief Complaint:     Jodi Duncan is a 45 y.o. male  who presents today for an established patient care visit with Personalized Prevention Plan Services as noted below. HPI:     Jodi Duncan is a 45 y.o. male with Hx GERD, HTN, HLD, depression/ anxiety, back pain, tobacco dependence, possible RUPINDER presenting today for ER visit follow up      ER visit for Chest pain  - Admitted to ED  for chest pain associated with anxiety and shortness of breath  - EKG did not have acute ischemic changes, serial troponin negative, NM stress test negative. ER determined that  symptoms most likely were due to GERD. Was started on Pantoprazole 40 mg. Took 3 days and started feeling better.   - Denies nausea, vomiting, shortness of breath today    GERD  - Hx of Tums/Pepcid use for GERD symptoms  - Has been eating really healthy other the last 3 months, so less acid reflux recently     Essential (primary) hypertension   - Lisinopril-hydrochlorothiazide 20-25 mg daily  - Pt denies any adverse effects with medication  - Denies dizziness, syncope, headaches     Hyperlipidemia  - Atorvastatin 20 mg daily  -Pt denies any adverse effects with medication    Obesity  - Walks a few miles a day, but because of back pain cannot do weight based exercises   - Has been eating a healthy diet for the past 3 months  - Worried about vitamin deficiencies with this new diet    Anxiety and depression   - Restarted on 5mg lexapro last visit but made him tired, wife convinced it to restart him 4 days ago  - Hx of diagnosis with bipolar depression, no record on this diagnosis in chart  - Patient endorses many stressors in his personal life that are worrying him     Back Pain  - Hx of disc herniations and osteoarthritis  -Tylenol BID    Tobacco Dependence  - Has discontinued use of patch, but is motivated to start again  - Previously down to 4-7 cigs daily with nicoderm 14mg patch. Possible RUPINDER  - Did not get  to sleep doctor referral     HM:    - Did not get Tdap, shingrix, pneumovax and moderna booster at pharmacy (as medicaid)  - Did not make appt for colonoscopy       Patient Active Problem List   Diagnosis    HTN (hypertension)    Back pain with radiation    Current tobacco use    Panic attacks    Vasovagal syncope    Gastroesophageal reflux disease without esophagitis    Morbid obesity (HCC)    Tachycardia    Diverticulitis    Lumbosacral spondylosis without myelopathy    Post herpetic neuralgia    Anxiety/depression    MDD (major depressive disorder)    Non morbid obesity due to excess calories    Essential hypertension    Chronic back  and neck pain    Diverticulosis    H/O renal calculi    Tobacco dependence    Fatty liver    Prediabetes    Risk of myocardial infarction or stroke 7.5% or greater in next 10 years    Pure hypercholesterolemia    Chest pain    Hyperlipidemia         Past Medical History:    Past Medical History:   Diagnosis Date    Anxiety     Chronic back  and neck pain     Degenerative disc disease     Diabetes mellitus (HCC)     prediabetic    Diverticulosis     Fatty liver     H/O renal calculi     Hyperlipidemia     Hypertension     MDD (major depressive disorder)     Obesity     Prediabetes     Pure hypercholesterolemia     Scoliosis     Tobacco dependence        Past Surgical History:  Past Surgical History:   Procedure Laterality Date    KIDNEY STONE SURGERY         Home Meds:  Prior to Visit Medications    Medication Sig Taking? for relevant ROS    Physical Exam:   There were no vitals filed for this visit. There is no height or weight on file to calculate BMI. Wt Readings from Last 3 Encounters:   08/15/22 261 lb 6.4 oz (118.6 kg)   08/05/22 250 lb (113.4 kg)   07/19/22 267 lb (121.1 kg)       BP Readings from Last 3 Encounters:   08/15/22 128/78   08/05/22 112/73   07/19/22 124/70       Physical Exam   Constitutional:  Reviewed vitals above  Well Nourished, well developed, no distress       Resp:  Normal effort  Clear to auscultation bilaterally without rhonchi, wheezing or crackles  Cardiovascular:   On auscultation, normal S1 and S2 without murmurs, rubs or gallops  No bruits of bilateral carotids and no JVD  Gastrointestinal:  Nontender, nondistended, and no masses  No hepatosplenomegaly  Musculoskeletal:  Normal Gait  All extremities without clubbing, cyanosis or edema  Skin:  No rashes on inspection  No areas of increased heat or induration on palpation  Psych:  Normal mood and affect  Normal insight and judgement         Lab Review:   Admission on 08/05/2022, Discharged on 08/05/2022   Component Date Value    Ventricular Rate 08/05/2022 86     Atrial Rate 08/05/2022 86     P-R Interval 08/05/2022 170     QRS Duration 08/05/2022 82     Q-T Interval 08/05/2022 366     QTc Calculation (Bazett) 08/05/2022 437     P Axis 08/05/2022 64     R [place] 08/05/2022 49     T Axis 08/05/2022 40     Diagnosis 08/05/2022 Baseline artifactNormal sinus rhythmNormal ECGWhen compared with ECG of 19-MAY-2022 00:48,No significant change was foundConfirmed by Cordelia Wiggins ([id number]) on 8/5/2022 7:31:19 PM     Sodium 08/05/2022 134 (A)    Potassium 08/05/2022 3.4 (A)    Chloride 08/05/2022 98 (A)    CO2 08/05/2022 27     Anion Gap 08/05/2022 9     Glucose 08/05/2022 120 (A)    BUN 08/05/2022 12     Creatinine 08/05/2022 0.8 (A)    GFR Non- 08/05/2022 >60     GFR  08/05/2022 >60     Calcium 08/05/2022 9.1     Total Protein 08/05/2022 7.1     Albumin 08/05/2022 4.7     Albumin/Globulin Ratio 08/05/2022 2.0     Total Bilirubin 08/05/2022 0.3     Alkaline Phosphatase 08/05/2022 111     ALT 08/05/2022 25     AST 08/05/2022 20     Troponin 08/05/2022 <0.01     D-Dimer, Quant 08/05/2022 <0.27     Specimen Status 08/05/2022 GURVINDER     Rejected Test 08/05/2022 CBCND     Reason for Rejection 08/05/2022 see below     WBC 08/05/2022 9.2     RBC 08/05/2022 4.07 (A)    Hemoglobin 08/05/2022 13.8     Hematocrit 08/05/2022 40.1 (A)    MCV 08/05/2022 98.3     MCH 08/05/2022 34.0     MCHC 08/05/2022 34.6     RDW 08/05/2022 12.8     Platelets 74/54/1563 290     MPV 08/05/2022 6.6     Troponin 08/05/2022 <0.01    Admission on 05/19/2022, Discharged on 05/19/2022   Component Date Value    Ventricular Rate 05/19/2022 105     Atrial Rate 05/19/2022 105     P-R Interval 05/19/2022 174     QRS Duration 05/19/2022 88     Q-T Interval 05/19/2022 336     QTc Calculation (Bazett) 05/19/2022 444     P Axis 05/19/2022 61     R [place] 05/19/2022 54     T Axis 05/19/2022 59     Diagnosis 05/19/2022 Sinus tachycardiaNonspecific ST abnormalityWhen compared with ECG of 02-FEB-2022 12:04,No significant change was foundConfirmed by ANTHONY Carranza MDHEN ([id number]) on 5/20/2022 8:05:14 AM     WBC 05/19/2022 9.1     RBC 05/19/2022 4.31     Hemoglobin 05/19/2022 14.7     Hematocrit 05/19/2022 42.5     MCV 05/19/2022 98.5     MCH 05/19/2022 34.0     MCHC 05/19/2022 34.5     RDW 05/19/2022 13.5     Platelets 81/53/7348 269     MPV 05/19/2022 6.9     Neutrophils % 05/19/2022 54.6     Lymphocytes % 05/19/2022 33.5     Monocytes % 05/19/2022 6.9     Eosinophils % 05/19/2022 4.3     Basophils % 05/19/2022 0.7     Neutrophils Absolute 05/19/2022 5.0     Lymphocytes Absolute 05/19/2022 3.1     Monocytes Absolute 05/19/2022 0.6     Eosinophils Absolute 05/19/2022 0.4     Basophils Absolute 05/19/2022 0.1     Sodium 05/19/2022 137     Potassium reflex Magnesi* 05/19/2022 3.8     Chloride 05/19/2022 101     CO2 05/19/2022 24     Anion Gap 05/19/2022 12     Glucose 05/19/2022 147 (A)    BUN 05/19/2022 14     Creatinine 05/19/2022 0.9     GFR Non- 05/19/2022 >60     GFR  05/19/2022 >60     Calcium 05/19/2022 9.1     Total Protein 05/19/2022 7.1     Albumin 05/19/2022 4.7     Albumin/Globulin Ratio 05/19/2022 2.0     Total Bilirubin 05/19/2022 0.4     Alkaline Phosphatase 05/19/2022 106     ALT 05/19/2022 30     AST 05/19/2022 29     Troponin 05/19/2022 <0.01     Specimen Status 05/19/2022 GURVINDER    Admission on 05/07/2022, Discharged on 05/07/2022   Component Date Value    WBC 05/07/2022 9.7     RBC 05/07/2022 4.21     Hemoglobin 05/07/2022 14.2     Hematocrit 05/07/2022 41.0     MCV 05/07/2022 97.6     MCH 05/07/2022 33.7     MCHC 05/07/2022 34.6     RDW 05/07/2022 13.2     Platelets 72/23/7669 271     MPV 05/07/2022 6.7     Neutrophils % 05/07/2022 60.1     Lymphocytes % 05/07/2022 29.4     Monocytes % 05/07/2022 6.0     Eosinophils % 05/07/2022 3.9     Basophils % 05/07/2022 0.6     Neutrophils Absolute 05/07/2022 5.8     Lymphocytes Absolute 05/07/2022 2.8     Monocytes Absolute 05/07/2022 0.6     Eosinophils Absolute 05/07/2022 0.4     Basophils Absolute 05/07/2022 0.1     Sodium 05/07/2022 135 (A)    Potassium reflex Magnesi* 05/07/2022 3.9     Chloride 05/07/2022 99     CO2 05/07/2022 23     Anion Gap 05/07/2022 13     Glucose 05/07/2022 153 (A)    BUN 05/07/2022 16     Creatinine 05/07/2022 1.0     GFR Non- 05/07/2022 >60     GFR  05/07/2022 >60     Calcium 05/07/2022 10.5     Total Protein 05/07/2022 6.9     Albumin 05/07/2022 4.8     Albumin/Globulin Ratio 05/07/2022 2.3 (A)    Total Bilirubin 05/07/2022 0.3     Alkaline Phosphatase 05/07/2022 111     ALT 05/07/2022 27     AST 05/07/2022 27     Color, UA 05/07/2022 Yellow     Clarity, UA 05/07/2022 Clear     Glucose, Ur 05/07/2022 Negative     Bilirubin Urine 05/07/2022 Negative Ketones, Urine 05/07/2022 Negative     Specific Gravity, UA 05/07/2022 >=1.030     Blood, Urine 05/07/2022 MODERATE (A)    pH, UA 05/07/2022 6.0     Protein, UA 05/07/2022 Negative     Urobilinogen, Urine 05/07/2022 0.2     Nitrite, Urine 05/07/2022 Negative     Leukocyte Esterase, Urine 05/07/2022 Negative     Microscopic Examination 05/07/2022 YES     Urine Type 05/07/2022 NotGiven     Urine Reflex to Culture 05/07/2022 Not Indicated     WBC, UA 05/07/2022 0-2     RBC, UA 05/07/2022 11-20 (A)    Epithelial Cells, UA 05/07/2022 0-1     Bacteria, UA 05/07/2022 Rare (A)    Crystals, UA 05/07/2022 2+ Ca. Oxalate (A)   Hospital Outpatient Visit on 04/05/2022   Component Date Value    Sodium 04/05/2022 139     Potassium 04/05/2022 4.6     Chloride 04/05/2022 99     CO2 04/05/2022 24     Anion Gap 04/05/2022 16     Glucose 04/05/2022 109 (A)    BUN 04/05/2022 16     Creatinine 04/05/2022 0.9     GFR Non- 04/05/2022 >60     GFR  04/05/2022 >60     Calcium 04/05/2022 9.6     Cholesterol, Total 04/05/2022 161     Triglycerides 04/05/2022 92     HDL 04/05/2022 43     LDL Calculated 04/05/2022 100 (A)    VLDL Cholesterol Calcula* 04/05/2022 18     Hemoglobin A1C 04/05/2022 5.7     eAG 04/05/2022 116.9     Hep C Ab Interp 04/05/2022 Non-reactive     HIV Ag/Ab 04/05/2022 Non-Reactive     HIV-1 Antibody 04/05/2022 Non-Reactive     HIV ANTIGEN 04/05/2022 Non-Reactive     HIV-2 Ab 04/05/2022 Non-Reactive           Assessment/Plan:  There are no diagnoses linked to this encounter.   Ira Alford is a 45 y.o. male with Hx GERD, HTN, HLD, depression/ anxiety, back pain, tobacco dependence, possible RUPINDER presenting today for ER visit follow up     ER visit for Chest pain  - Chest pain likely due to GERD and anxiety, cardiac causes excluded in ER     GERD  - Poorly controlled  - Start Omeprazole 20 mg daily  - Return to clinic in 3 months to reassess and decide if he can switch to Pepsid for long term control       Essential (primary) hypertension   - Well controlled  - 112/68 today  - Continue Lisinopril-hydrochlorothiazide 20-25 mg daily     Hyperlipidemia  - Poorly controlled per lipid panel 4/05  - Lipid panel 4/05 , VLDL 18, HDL 43, TAG 92, cholesterol 161  - Continue Atorvastatin 20 mg daily  - Repeat lipid panel was ordered 7/19    Obesity  - Not at goal  - BMI 35.67 today  - Encouraged to continue dietary changes and exercise routine    Anxiety and depression   - Poorly controlled  - Continue Lexapro 5mg  - Encouraged to consider counseling when he has more time in his schedule      Back Pain  - Well controlled  - ContinueTylenol BID and exercise routine    Tobacco Dependence  - Not at goal   - Restart nicoderm 14mg patch.        Possible RUPINDER  - Encouraged to make appointment with sleep doctor via referral       Health Maintenance Due:  - Encouraged to get Tdap, shingrix, pneumovax and moderna booster at pharmacy (as medicaid)  - Encouraged to make appointment for colonoscopy via prior referral     Health Maintenance Due   Topic Date Due    Pneumococcal 0-64 years Vaccine (1 - PCV) Never done    DTaP/Tdap/Td vaccine (1 - Tdap) Never done    Colorectal Cancer Screen  Never done    Shingles vaccine (1 of 2) Never done          Health care decision maker:  <72years old      Health Maintenance: (USPSTF Recommendations)  (F) Breast Cancer Screen: (40-49 (C), 50-74 biennial screening mammogram (B))  (F) Cervical Cancer Screen: (21-29 q3yr cytology alone; 30-65 q3yr cytology alone, q5yr with hrHPV alone, or q5yr cytology+hrHPV (A))  (M) Prostate Cancer Screen: (54-77 yo discuss benefits/harm, does not recommend testing PSA in men >73 yo (D):   (M) AAA Screen: (men 73-67 yo who has ever smoked (B), consider in nonsmokers if high risk):  CRC/Colonoscopy Screening: (adults 39-53 (B), 50-75 (A))  Lung Ca Screening: Annual LDCT (+smoker age 49-80, smoked within 15 years, total of 20 pack yr history (B)): DEXA Screen: (women >65 and older, <65 if at risk/postmenopausal (B))  HIV Screen: (16-65 yr old, and all pregnant patients (A)): Hep C Screen: (18-79 yr old (B)):  HCC Screen: (all pts with cirrhosis and high risk Hep B (US q6 mo)):  Immunizations:    RTC:  No follow-ups on file. Return to clinic in 3 months for chronic condition follow up    EMR Dragon/transcription disclaimer:  Much of this encounter note is electronic transcription/translation of spoken language to printed texts. The electronic translation of spoken language may be erroneous, or at times, nonsensical words or phrases may be inadvertently transcribed.   Although I have reviewed the note for such errors, some may still exist.

## 2022-08-16 NOTE — PATIENT INSTRUCTIONS
Get Tdap, pneumovax,  and Shingrix vaccines at [address], [address] [address]  Ph: [phone]    Read about the DASH diet    Goal:  150min walking weekly.     [facility]  [address], [address] [place] [address]  Phone: [phone]

## 2022-08-23 ENCOUNTER — CARE COORDINATION (OUTPATIENT)
Dept: CASE MANAGEMENT | Age: 52
End: 2022-08-23

## 2022-08-23 NOTE — CARE COORDINATION
[address] Transitions Follow Up Call    2022    Patient: Senait Toussaint  Patient : 1970   MRN: [id number]  Reason for Admission: chest pain  Discharge Date: 22 RARS: No data recorded       Spoke with: na    Attempted to reach patient via phone for transition call. message received  \"memory full\" and no option to leave message    Care Transitions Subsequent and Final Call    Subsequent and Final Calls  Care Transitions Interventions  Other Interventions:              Follow Up  Future Appointments   Date Time Provider Regina Wilcox   10/20/2022  1:00 PM León Lizama MD [facility] AND [facility]       Anil Nance RN

## 2022-08-29 RX ORDER — PANTOPRAZOLE SODIUM 40 MG/1
TABLET, DELAYED RELEASE ORAL
Qty: 90 TABLET | Refills: 0 | OUTPATIENT
Start: 2022-08-29

## 2022-08-29 NOTE — TELEPHONE ENCOUNTER
Refill Request   According to chart it shows list clean up and medication was discontinued    Last Seen: Last Seen Department: 8/16/2022  Last Seen by PCP: Visit date not found    Last Written: 8/5/22 #30 with 1  Next Appointment:   Future Appointments   Date Time Provider Regina Wilcox   10/20/2022  1:00 PM Sarah Zarate MD [facility] AND [facility]             Requested Prescriptions     Pending Prescriptions Disp Refills    pantoprazole (PROTONIX) 40 MG tablet [Pharmacy Med Name: PANTOPRAZOLE SOD DR 40 MG TAB] 30 tablet 1     Sig: TAKE 1 TABLET BY MOUTH EVERY DAY BEFORE BREAKFAST

## 2022-08-30 ENCOUNTER — CARE COORDINATION (OUTPATIENT)
Dept: CASE MANAGEMENT | Age: 52
End: 2022-08-30

## 2022-08-30 NOTE — CARE COORDINATION
[facility] Transitions Follow Up Call    2022    Patient: Jodi Duncan  Patient : 1970   MRN: [id number]  Reason for Admission: CP  Discharge Date: 22 RARS: No data recorded       Spoke with: na    Second and final attempt made to reach patient for transition call. message \"memory full\" and no option to leave message        Care Transitions Subsequent and Final Call    Subsequent and Final Calls  Care Transitions Interventions  Other Interventions:              Follow Up  Future Appointments   Date Time Provider Regina Wilcox   10/20/2022  1:00 PM Analilia Pedraza MD [facility] AND RES JENNIFER Giron RN

## 2022-09-06 RX ORDER — TAMSULOSIN HYDROCHLORIDE 0.4 MG/1
CAPSULE ORAL
Qty: 30 CAPSULE | Refills: 0 | OUTPATIENT
Start: 2022-09-06

## 2022-10-17 ENCOUNTER — TELEPHONE (OUTPATIENT)
Dept: PRIMARY CARE CLINIC | Age: 52
End: 2022-10-17

## 2022-10-17 NOTE — TELEPHONE ENCOUNTER
Spoke to pt and informed him of Dr. Sarah loredo. Dr. Slade Sabillon didn't have anything tomorrow morning.  I put pt with Dr. Sana Patel 10/18/22

## 2022-10-17 NOTE — TELEPHONE ENCOUNTER
This cant be safely evaluated over the phone    If pain is severe, or if has other concerning symptoms (chest pain, Shortness of breath, etc. .  .) he should go to ED now    If not, scheduled with Dr Sidney Obregon tomorrow morning    Please document call and then close encounter.   thanks

## 2022-10-17 NOTE — TELEPHONE ENCOUNTER
For the past few days the Pt has had constant heavy pain on his right side that spreads around to his back. No fever or injury    Pt was wondering if Dr. Quevedo Said would want to send him for a scan. Pt does not want to go to the ER as he feels that they never really do anything. [phone]      Pt scheduled with Dr. Quevedo Said 10/20/2022.

## 2022-10-17 NOTE — PROGRESS NOTES
[address] and [facility]                                             [address] [facility], [address] [place], [place] [id number]        Phone: [phone]      Name:  Talat Moreno  :    1970    Consultants:   Patient Care Team:  Blayne Boothe MD as PCP - General (Family Medicine)  Blayne Boothe MD as PCP - [address] [place] [address] Dr Rosa Provider    Chief Complaint:     Talat Moreno is a 45 y.o. male  who presents today for an established patient care visit with Personalized Prevention Plan Services as noted below. HPI:     Samm Rayo is a 45year old male with a PMH of  GERD, HTN, HLD, depression/ anxiety, back pain, tobacco dependence, possible RUPINDER presenting today for acute visit for right sided flank/back pain    Right sided pain  The pain began a few days ago on his right side. He states that on Saturday night working as a  he had 2 shots of alcohol and later than evening he began to feel some pain. It ranges from his upper abdomen around his right side and into his low back. The pain has been constant for the past few days, but has steadily improved. He reports that today it feels more like a \"heavy\" sensation in his abdomen with some sharp pain in his right low back. He has tried taking some Tylenol, has helped a little bit. Notes that he has a hx of fatty liver disease, hx of kidney stones 8-9 years ago requiring operative management, hx of degenerative disc disease, and hx of hematuria for 5+ years. Patient states the pain does not feel like his previous kidney stones  He is able to eat and drink without issue. Denies any nausea, vomiting, change in bowel habits, fever, blood in stool, urinary frequency, urinary hesitancy, dysuria  Patient also admits a family hx of cancer in his father.  He states he is unsure what the cancer was but does states that his father also experienced some hematuria. Patient without previous abdominal surgeries. LFT on 8/5/22 looked normal  Patient smokes 1ppd    Previous ED visit for chest pain in August. Negative work up including EKG, troponin, NM stress test.    Previous CT abdomen done in 5/10/22 that showed no acute abdominopelvic abnormality, no urolithiasis. It did show mild aortoiliac calcification and multilevel spondylosis of lumbar spine, most at L4-5    GERD  On omeprazole 40 mg daily  No complaints or side effects of medication    HTN  On lisinopril-HCTZ 20-25 daily  Previous CMP done with normal kidney function    HLD  On atorvastatin 20 mg daily      Patient Active Problem List   Diagnosis    HTN (hypertension)    Back pain with radiation    Current tobacco use    Panic attacks    Vasovagal syncope    Gastroesophageal reflux disease without esophagitis    Morbid obesity (HCC)    Tachycardia    Diverticulitis    Lumbosacral spondylosis without myelopathy    Post herpetic neuralgia    Anxiety/depression    MDD (major depressive disorder)    Non morbid obesity due to excess calories    Essential hypertension    Chronic back  and neck pain    Diverticulosis    H/O renal calculi    Tobacco dependence    Fatty liver    Prediabetes    Risk of myocardial infarction or stroke 7.5% or greater in next 10 years    Pure hypercholesterolemia    Chest pain    Hyperlipidemia         Past Medical History:    Past Medical History:   Diagnosis Date    Anxiety     Chronic back  and neck pain     Degenerative disc disease     Diabetes mellitus (HCC)     prediabetic    Diverticulosis     Fatty liver     H/O renal calculi     Hyperlipidemia     Hypertension     MDD (major depressive disorder)     Obesity     Prediabetes     Pure hypercholesterolemia     Scoliosis     Tobacco dependence        Past Surgical History:  Past Surgical History:   Procedure Laterality Date    KIDNEY STONE SURGERY         Home Meds:  Prior to Visit Medications    Medication Sig Taking? Authorizing Provider   escitalopram (LEXAPRO) 5 MG tablet TAKE 1 TABLET BY MOUTH EVERY DAY Yes Mckenna Lassiter MD   atorvastatin (LIPITOR) 20 MG tablet Take 1 tablet by mouth in the morning. Yes Socorro Holman,    lisinopril-hydroCHLOROthiazide (PRINZIDE;ZESTORETIC) 20-25 MG per tablet TAKE 1 TABLET BY MOUTH EVERY DAY Yes Mckenna Lassiter MD   omeprazole (PRILOSEC) 40 MG delayed release capsule Take 1 capsule by mouth every morning (before breakfast)  Patient not taking: Reported on 10/18/2022  Mckenna Lassiter MD   nicotine (NICODERM CQ) 14 MG/24HR PLACE 1 PATCH ONTO THE SKIN EVERY 24 HOURS. Patient not taking: No sig reported  José Luis Patricia.  Carolina Rich., DO   diclofenac (VOLTAREN) 75 MG EC tablet Take 1 tablet by mouth 2 times daily as needed for Pain  Pily Foster MD       Allergies:    Ciprofloxacin    Family History:       Problem Relation Age of Onset    Heart Disease Mother     Arrhythmia Mother     Diabetes Mother     Leukemia Father     Diabetes Maternal Grandmother     No Known Problems Paternal Grandmother     No Known Problems Paternal Grandfather          Health Maintenance Completed:  Health Maintenance   Topic Date Due    Pneumococcal 0-64 years Vaccine (1 - PCV) Never done    DTaP/Tdap/Td vaccine (1 - Tdap) Never done    Colorectal Cancer Screen  Never done    Shingles vaccine (1 of 2) Never done    Flu vaccine (1) Never done    COVID-19 Vaccine (3 - Booster for [place] series) 04/25/2023 (Originally 11/5/2021)    A1C test (Diabetic or Prediabetic)  04/05/2023    Lipids  04/05/2023    Depression Monitoring  08/15/2023    Hepatitis C screen  Completed    HIV screen  Completed    Hepatitis A vaccine  Aged Out    Hib vaccine  Aged Out    Meningococcal (ACWY) vaccine  Aged [employer] History   Administered Date(s) Administered    COVID-19, MODERNA BLUE border, Primary or Immunocompromised, (age 12y+), IM, 100 mcg/0.5mL 05/08/2021, 06/05/2021         Review of Systems:  Review of Systems   Constitutional:  Negative for activity change and appetite change. HENT:  Negative for congestion, sinus pain and sore throat. Eyes:  Negative for pain. Respiratory:  Negative for cough and shortness of breath. Cardiovascular:  Negative for chest pain. Gastrointestinal:  Positive for abdominal pain. Negative for blood in stool, constipation, diarrhea, nausea and vomiting. Genitourinary:  Positive for flank pain and hematuria. Negative for difficulty urinating, dysuria and urgency. Musculoskeletal:  Negative for arthralgias and myalgias. Neurological:  Negative for dizziness, weakness, light-headedness and headaches. Psychiatric/Behavioral:  Negative for agitation and behavioral problems. Physical Exam:   Vitals:    10/18/22 1029   BP: 110/68   Site: Left Upper Arm   Position: Sitting   Cuff Size: Large Adult   Pulse: 96   Temp: 97 °F (36.1 °C)   TempSrc: Temporal   SpO2: 98%   Weight: 264 lb (119.7 kg)     Body mass index is 35.8 kg/m². Wt Readings from Last 3 Encounters:   10/18/22 264 lb (119.7 kg)   08/16/22 263 lb (119.3 kg)   08/15/22 261 lb 6.4 oz (118.6 kg)       BP Readings from Last 3 Encounters:   10/18/22 110/68   08/16/22 112/68   08/15/22 128/78       Physical Exam  Constitutional:       Appearance: Normal appearance. HENT:      Head: Normocephalic and atraumatic. Eyes:      Extraocular Movements: Extraocular movements intact. Conjunctiva/sclera: Conjunctivae normal.   Cardiovascular:      Rate and Rhythm: Normal rate and regular rhythm. Pulses: Normal pulses. Heart sounds: Normal heart sounds. Pulmonary:      Effort: Pulmonary effort is normal.      Breath sounds: Normal breath sounds. Abdominal:      General: Abdomen is flat. Bowel sounds are normal.      Palpations: Abdomen is soft. Tenderness: There is no right CVA tenderness, guarding or rebound. Hernia: No hernia is present.       Comments: Slight tenderness on deep palpation of RUQ. Negative Wolfe's sign   Musculoskeletal:         General: Normal range of motion. Skin:     General: Skin is warm and dry. Capillary Refill: Capillary refill takes less than 2 seconds. Neurological:      General: No focal deficit present. Mental Status: He is alert and oriented to person, place, and time.    Psychiatric:         Mood and Affect: Mood normal.         Behavior: Behavior normal.            Lab Review:   Admission on 08/05/2022, Discharged on 08/05/2022   Component Date Value    Ventricular Rate 08/05/2022 86     Atrial Rate 08/05/2022 86     P-R Interval 08/05/2022 170     QRS Duration 08/05/2022 82     Q-T Interval 08/05/2022 366     QTc Calculation (Bazett) 08/05/2022 437     P Axis 08/05/2022 64     R [place] 08/05/2022 49     T Axis 08/05/2022 40     Diagnosis 08/05/2022 Baseline artifactNormal sinus rhythmNormal ECGWhen compared with ECG of 19-MAY-2022 00:48,No significant change was foundConfirmed by Jad Fernandez ([id number]) on 8/5/2022 7:31:19 PM     Sodium 08/05/2022 134 (A)     Potassium 08/05/2022 3.4 (A)     Chloride 08/05/2022 98 (A)     CO2 08/05/2022 27     Anion Gap 08/05/2022 9     Glucose 08/05/2022 120 (A)     BUN 08/05/2022 12     Creatinine 08/05/2022 0.8 (A)     GFR Non- 08/05/2022 >60     GFR  08/05/2022 >60     Calcium 08/05/2022 9.1     Total Protein 08/05/2022 7.1     Albumin 08/05/2022 4.7     Albumin/Globulin Ratio 08/05/2022 2.0     Total Bilirubin 08/05/2022 0.3     Alkaline Phosphatase 08/05/2022 111     ALT 08/05/2022 25     AST 08/05/2022 20     Troponin 08/05/2022 <0.01     D-Dimer, Quant 08/05/2022 <0.27     Specimen Status 08/05/2022 GURVINDER     Rejected Test 08/05/2022 CBCND     Reason for Rejection 08/05/2022 see below     WBC 08/05/2022 9.2     RBC 08/05/2022 4.07 (A)     Hemoglobin 08/05/2022 13.8     Hematocrit 08/05/2022 40.1 (A)     MCV 08/05/2022 98.3     MCH 08/05/2022 34.0     MCHC 08/05/2022 34.6     RDW 08/05/2022 12.8     Platelets 01/15/0278 290     MPV 08/05/2022 6.6     Left Ventricular Ejectio* 08/05/2022 73     LVEF MODALITY 08/05/2022 Nuclear     Troponin 08/05/2022 <0.01    Admission on 05/19/2022, Discharged on 05/19/2022   Component Date Value    Ventricular Rate 05/19/2022 105     Atrial Rate 05/19/2022 105     P-R Interval 05/19/2022 174     QRS Duration 05/19/2022 88     Q-T Interval 05/19/2022 336     QTc Calculation (Bazett) 05/19/2022 444     P Axis 05/19/2022 61     R [place] 05/19/2022 54     T Axis 05/19/2022 59     Diagnosis 05/19/2022 Sinus tachycardiaNonspecific ST abnormalityWhen compared with ECG of 02-FEB-2022 12:04,No significant change was foundConfirmed by Leona Vick MD, TARI ([id number]) on 5/20/2022 8:05:14 AM     WBC 05/19/2022 9.1     RBC 05/19/2022 4.31     Hemoglobin 05/19/2022 14.7     Hematocrit 05/19/2022 42.5     MCV 05/19/2022 98.5     MCH 05/19/2022 34.0     MCHC 05/19/2022 34.5     RDW 05/19/2022 13.5     Platelets 16/51/3276 269     MPV 05/19/2022 6.9     Neutrophils % 05/19/2022 54.6     Lymphocytes % 05/19/2022 33.5     Monocytes % 05/19/2022 6.9     Eosinophils % 05/19/2022 4.3     Basophils % 05/19/2022 0.7     Neutrophils Absolute 05/19/2022 5.0     Lymphocytes Absolute 05/19/2022 3.1     Monocytes Absolute 05/19/2022 0.6     Eosinophils Absolute 05/19/2022 0.4     Basophils Absolute 05/19/2022 0.1     Sodium 05/19/2022 137     Potassium reflex Magnesi* 05/19/2022 3.8     Chloride 05/19/2022 101     CO2 05/19/2022 24     Anion Gap 05/19/2022 12     Glucose 05/19/2022 147 (A)     BUN 05/19/2022 14     Creatinine 05/19/2022 0.9     GFR Non- 05/19/2022 >60     GFR  05/19/2022 >60     Calcium 05/19/2022 9.1     Total Protein 05/19/2022 7.1     Albumin 05/19/2022 4.7     Albumin/Globulin Ratio 05/19/2022 2.0     Total Bilirubin 05/19/2022 0.4     Alkaline Phosphatase 05/19/2022 106     ALT 05/19/2022 30     AST 05/19/2022 29     Troponin 05/19/2022 <0.01     Specimen Status 05/19/2022 GURVINDER    Admission on 05/07/2022, Discharged on 05/07/2022   Component Date Value    WBC 05/07/2022 9.7     RBC 05/07/2022 4.21     Hemoglobin 05/07/2022 14.2     Hematocrit 05/07/2022 41.0     MCV 05/07/2022 97.6     MCH 05/07/2022 33.7     MCHC 05/07/2022 34.6     RDW 05/07/2022 13.2     Platelets 14/47/2240 271     MPV 05/07/2022 6.7     Neutrophils % 05/07/2022 60.1     Lymphocytes % 05/07/2022 29.4     Monocytes % 05/07/2022 6.0     Eosinophils % 05/07/2022 3.9     Basophils % 05/07/2022 0.6     Neutrophils Absolute 05/07/2022 5.8     Lymphocytes Absolute 05/07/2022 2.8     Monocytes Absolute 05/07/2022 0.6     Eosinophils Absolute 05/07/2022 0.4     Basophils Absolute 05/07/2022 0.1     Sodium 05/07/2022 135 (A)     Potassium reflex Magnesi* 05/07/2022 3.9     Chloride 05/07/2022 99     CO2 05/07/2022 23     Anion Gap 05/07/2022 13     Glucose 05/07/2022 153 (A)     BUN 05/07/2022 16     Creatinine 05/07/2022 1.0     GFR Non- 05/07/2022 >60     GFR  05/07/2022 >60     Calcium 05/07/2022 10.5     Total Protein 05/07/2022 6.9     Albumin 05/07/2022 4.8     Albumin/Globulin Ratio 05/07/2022 2.3 (A)     Total Bilirubin 05/07/2022 0.3     Alkaline Phosphatase 05/07/2022 111     ALT 05/07/2022 27     AST 05/07/2022 27     Color, UA 05/07/2022 Yellow     Clarity, UA 05/07/2022 Clear     Glucose, Ur 05/07/2022 Negative     Bilirubin Urine 05/07/2022 Negative     Ketones, Urine 05/07/2022 Negative     Specific Gravity, UA 05/07/2022 >=1.030     Blood, Urine 05/07/2022 MODERATE (A)     pH, UA 05/07/2022 6.0     Protein, UA 05/07/2022 Negative     Urobilinogen, Urine 05/07/2022 0.2     Nitrite, Urine 05/07/2022 Negative     Leukocyte Esterase, Urine 05/07/2022 Negative     Microscopic Examination 05/07/2022 YES     Urine Type 05/07/2022 NotGiven     Urine Reflex to Culture 05/07/2022 Not Indicated     WBC, UA 05/07/2022 0-2     RBC, UA 05/07/2022 11-20 (A)     Epithelial Cells, UA 05/07/2022 0-1     Bacteria, UA 05/07/2022 Rare (A)     Crystals, UA 05/07/2022 2+ Ca. Oxalate (A)           Assessment/Plan:  Kimberlee Greene is a 45year old male with a PMH of  GERD, HTN, HLD, depression/ anxiety, back pain, tobacco dependence, possible RUPINDER presenting today for acute visit for right sided flank/back pain    Diagnoses and all orders for this visit:      Abdominal pain  2. Right flank pain  - Not well controlled  - Differential includes nephrolithiasis, pancreatitis, hepatitis, fatty liver , gallbladder dysfunction  - Will order CT abdomen pelvis to rule out any acute cause  - Ordered lipase to rule out pancreatitis  - Ordered CMP to check kidney function  - Continue with ibuprofen for pain control. Stop Tylenol incase of any liver cause of pain, can resume if CT scan normal  - Advised patient to stay hydrated    3. Hematuria  - Not well controlled  - UA done in office with moderate blood found on dipstick  - Patient with hematuria for the past 5+ years on every UA he has had done  - Work up needed to rule out serious cause of bleeding, including malignancy of the bladder. He has risk factors including age, smoking history, and unknown malignancy in father  - Referral placed for urology for need for cystoscopy    4. Aortoiliac calcification  5. Bilateral renal cysts  6. Multilevel spondylosis  - Found on CT scan done in May  - Continue on ACE inhibitor and Statin medication  - Will see progress on CT scan ordered today  - Continue with exercises and stretching for back pain. If worsens can consider PT referral or further workup    7. GERD  - Well controlled  - Continue omeprazole 40 mg    8.  Hypertension  - Well controlled  - Continue lisinopril-hydrochlorothiazide 20-25mg daily      Health Maintenance Due:  Health Maintenance Due   Topic Date Due    Pneumococcal 0-64 years Vaccine (1 - PCV) Never done    DTaP/Tdap/Td vaccine (1 - Tdap) Never done    Colorectal Cancer Screen  Never done    Shingles vaccine (1 of 2) Never done    Flu vaccine (1) Never done          Health care decision maker:  <72years old      Health Maintenance: (USPSTF Recommendations)  (M) Prostate Cancer Screen: (54-77 yo discuss benefits/harm, does not recommend testing PSA in men >73 yo (D):   (M) AAA Screen: (men 73-69 yo who has ever smoked (B), consider in nonsmokers if high risk):  CRC/Colonoscopy Screening: (adults 39-53 (B), 54-65 (A))  HIV Screen: (16-65 yr old, and all pregnant patients (A)): Hep C Screen: (18-79 yr old (B)):  HCC Screen: (all pts with cirrhosis and high risk Hep B (US q6 mo)):  Immunizations:    RTC:  Return in about 2 weeks (around 11/1/2022) for Chronic conditions. EMR Dragon/transcription disclaimer:  Much of this encounter note is electronic transcription/translation of spoken language to printed texts. The electronic translation of spoken language may be erroneous, or at times, nonsensical words or phrases may be inadvertently transcribed.   Although I have reviewed the note for such errors, some may still exist.     Yo Larose, DO  PGY1 Family Medicine Resident  [address] [place] [address] Residency

## 2022-10-18 ENCOUNTER — OFFICE VISIT (OUTPATIENT)
Dept: PRIMARY CARE CLINIC | Age: 52
End: 2022-10-18
Payer: COMMERCIAL

## 2022-10-18 ENCOUNTER — HOSPITAL ENCOUNTER (OUTPATIENT)
Age: 52
Discharge: HOME | End: 2022-10-18
Payer: COMMERCIAL

## 2022-10-18 VITALS
HEART RATE: 96 BPM | WEIGHT: 264 LBS | TEMPERATURE: 97 F | BODY MASS INDEX: 35.8 KG/M2 | OXYGEN SATURATION: 98 % | SYSTOLIC BLOOD PRESSURE: 110 MMHG | DIASTOLIC BLOOD PRESSURE: 68 MMHG

## 2022-10-18 DIAGNOSIS — R31.9 HEMATURIA, UNSPECIFIED TYPE: ICD-10-CM

## 2022-10-18 DIAGNOSIS — I10 HYPERTENSION, UNSPECIFIED TYPE: ICD-10-CM

## 2022-10-18 DIAGNOSIS — E87.6 HYPOKALEMIA: ICD-10-CM

## 2022-10-18 DIAGNOSIS — R10.9 FLANK PAIN: ICD-10-CM

## 2022-10-18 DIAGNOSIS — E87.1 HYPONATREMIA: ICD-10-CM

## 2022-10-18 DIAGNOSIS — R10.11 RIGHT UPPER QUADRANT ABDOMINAL PAIN: ICD-10-CM

## 2022-10-18 DIAGNOSIS — K21.9 GASTROESOPHAGEAL REFLUX DISEASE WITHOUT ESOPHAGITIS: ICD-10-CM

## 2022-10-18 DIAGNOSIS — M47.817 LUMBOSACRAL SPONDYLOSIS WITHOUT MYELOPATHY: ICD-10-CM

## 2022-10-18 DIAGNOSIS — R10.11 RIGHT UPPER QUADRANT ABDOMINAL PAIN: Primary | ICD-10-CM

## 2022-10-18 LAB
A/G RATIO: 2.5 (ref 1.1–2.2)
ALBUMIN SERPL-MCNC: 5 G/DL (ref 3.4–5)
ALP BLD-CCNC: 108 U/L (ref 40–129)
ALT SERPL-CCNC: 21 U/L (ref 10–40)
ANION GAP SERPL CALCULATED.3IONS-SCNC: 14 MMOL/L (ref 3–16)
AST SERPL-CCNC: 16 U/L (ref 15–37)
BILIRUB SERPL-MCNC: 0.3 MG/DL (ref 0–1)
BILIRUBIN, POC: ABNORMAL
BLOOD URINE, POC: ABNORMAL
BUN BLDV-MCNC: 12 MG/DL (ref 7–20)
CALCIUM SERPL-MCNC: 9.5 MG/DL (ref 8.3–10.6)
CHLORIDE BLD-SCNC: 100 MMOL/L (ref 99–110)
CLARITY, POC: CLEAR
CO2: 26 MMOL/L (ref 21–32)
COLOR, POC: YELLOW
CREAT SERPL-MCNC: 0.8 MG/DL (ref 0.9–1.3)
GFR SERPL CREATININE-BSD FRML MDRD: >60 ML/MIN/{1.73_M2}
GLUCOSE BLD-MCNC: 109 MG/DL (ref 70–99)
GLUCOSE URINE, POC: ABNORMAL
KETONES, POC: ABNORMAL
LEUKOCYTE EST, POC: ABNORMAL
LIPASE: 39 U/L (ref 13–60)
NITRITE, POC: ABNORMAL
PH, POC: 6
POTASSIUM SERPL-SCNC: 4.1 MMOL/L (ref 3.5–5.1)
PROTEIN, POC: ABNORMAL
SODIUM BLD-SCNC: 140 MMOL/L (ref 136–145)
SPECIFIC GRAVITY, POC: 1.01
TOTAL PROTEIN: 7 G/DL (ref 6.4–8.2)
UROBILINOGEN, POC: 0.2

## 2022-10-18 PROCEDURE — 83690 ASSAY OF LIPASE: CPT

## 2022-10-18 PROCEDURE — G8427 DOCREV CUR MEDS BY ELIG CLIN: HCPCS

## 2022-10-18 PROCEDURE — 36415 COLL VENOUS BLD VENIPUNCTURE: CPT

## 2022-10-18 PROCEDURE — 81002 URINALYSIS NONAUTO W/O SCOPE: CPT

## 2022-10-18 PROCEDURE — 80053 COMPREHEN METABOLIC PANEL: CPT

## 2022-10-18 PROCEDURE — G8417 CALC BMI ABV UP PARAM F/U: HCPCS

## 2022-10-18 PROCEDURE — 99214 OFFICE O/P EST MOD 30 MIN: CPT

## 2022-10-18 PROCEDURE — 3017F COLORECTAL CA SCREEN DOC REV: CPT

## 2022-10-18 PROCEDURE — G8484 FLU IMMUNIZE NO ADMIN: HCPCS

## 2022-10-18 PROCEDURE — 4004F PT TOBACCO SCREEN RCVD TLK: CPT

## 2022-10-18 ASSESSMENT — PATIENT HEALTH QUESTIONNAIRE - PHQ9
SUM OF ALL RESPONSES TO PHQ QUESTIONS 1-9: 0
5. POOR APPETITE OR OVEREATING: 0
3. TROUBLE FALLING OR STAYING ASLEEP: 0
8. MOVING OR SPEAKING SO SLOWLY THAT OTHER PEOPLE COULD HAVE NOTICED. OR THE OPPOSITE, BEING SO FIGETY OR RESTLESS THAT YOU HAVE BEEN MOVING AROUND A LOT MORE THAN USUAL: 0
9. THOUGHTS THAT YOU WOULD BE BETTER OFF DEAD, OR OF HURTING YOURSELF: 0
7. TROUBLE CONCENTRATING ON THINGS, SUCH AS READING THE NEWSPAPER OR WATCHING TELEVISION: 0
6. FEELING BAD ABOUT YOURSELF - OR THAT YOU ARE A FAILURE OR HAVE LET YOURSELF OR YOUR FAMILY DOWN: 0
SUM OF ALL RESPONSES TO PHQ QUESTIONS 1-9: 0
10. IF YOU CHECKED OFF ANY PROBLEMS, HOW DIFFICULT HAVE THESE PROBLEMS MADE IT FOR YOU TO DO YOUR WORK, TAKE CARE OF THINGS AT HOME, OR GET ALONG WITH OTHER PEOPLE: 0
4. FEELING TIRED OR HAVING LITTLE ENERGY: 0
2. FEELING DOWN, DEPRESSED OR HOPELESS: 0
SUM OF ALL RESPONSES TO PHQ QUESTIONS 1-9: 0
1. LITTLE INTEREST OR PLEASURE IN DOING THINGS: 0
SUM OF ALL RESPONSES TO PHQ QUESTIONS 1-9: 0
SUM OF ALL RESPONSES TO PHQ9 QUESTIONS 1 & 2: 0

## 2022-10-18 ASSESSMENT — ENCOUNTER SYMPTOMS
NAUSEA: 0
EYE PAIN: 0
SINUS PAIN: 0
ABDOMINAL PAIN: 1
COUGH: 0
CONSTIPATION: 0
SHORTNESS OF BREATH: 0
SORE THROAT: 0
VOMITING: 0
BLOOD IN STOOL: 0
DIARRHEA: 0

## 2022-10-18 ASSESSMENT — ANXIETY QUESTIONNAIRES
3. WORRYING TOO MUCH ABOUT DIFFERENT THINGS: 0
1. FEELING NERVOUS, ANXIOUS, OR ON EDGE: 0
7. FEELING AFRAID AS IF SOMETHING AWFUL MIGHT HAPPEN: 0
IF YOU CHECKED OFF ANY PROBLEMS ON THIS QUESTIONNAIRE, HOW DIFFICULT HAVE THESE PROBLEMS MADE IT FOR YOU TO DO YOUR WORK, TAKE CARE OF THINGS AT HOME, OR GET ALONG WITH OTHER PEOPLE: NOT DIFFICULT AT ALL
6. BECOMING EASILY ANNOYED OR IRRITABLE: 0
GAD7 TOTAL SCORE: 0
4. TROUBLE RELAXING: 0
2. NOT BEING ABLE TO STOP OR CONTROL WORRYING: 0
5. BEING SO RESTLESS THAT IT IS HARD TO SIT STILL: 0

## 2022-10-27 ENCOUNTER — HOSPITAL ENCOUNTER (OUTPATIENT)
Dept: CT IMAGING | Age: 52
Discharge: HOME | End: 2022-10-27
Payer: COMMERCIAL

## 2022-10-27 DIAGNOSIS — R10.9 FLANK PAIN: ICD-10-CM

## 2022-10-27 DIAGNOSIS — R10.11 RIGHT UPPER QUADRANT ABDOMINAL PAIN: ICD-10-CM

## 2022-10-27 DIAGNOSIS — M47.817 LUMBOSACRAL SPONDYLOSIS WITHOUT MYELOPATHY: ICD-10-CM

## 2022-10-27 PROCEDURE — 74176 CT ABD & PELVIS W/O CONTRAST: CPT

## 2022-11-14 NOTE — TELEPHONE ENCOUNTER
We received a refill request from [facility] for pt's Omeprazole, however, I show in chart pt isn't taking as of 10/18/22. I called pt but VM was full. [phone] ([place])   Need to clarify if he requested this medication.

## 2022-11-16 NOTE — TELEPHONE ENCOUNTER
Tried calling patient unable to leave a message   Patient is scheduled with Dr. Marilee Love on 11/17/2022

## 2022-11-28 RX ORDER — OMEPRAZOLE 40 MG/1
CAPSULE, DELAYED RELEASE ORAL
Qty: 90 CAPSULE | Refills: 0 | OUTPATIENT
Start: 2022-11-28

## 2023-01-09 ENCOUNTER — HOSPITAL ENCOUNTER (OUTPATIENT)
Age: 53
Discharge: HOME | End: 2023-01-09
Payer: COMMERCIAL

## 2023-01-09 ENCOUNTER — OFFICE VISIT (OUTPATIENT)
Dept: PRIMARY CARE CLINIC | Age: 53
End: 2023-01-09
Payer: COMMERCIAL

## 2023-01-09 VITALS
DIASTOLIC BLOOD PRESSURE: 76 MMHG | BODY MASS INDEX: 37.06 KG/M2 | SYSTOLIC BLOOD PRESSURE: 124 MMHG | RESPIRATION RATE: 18 BRPM | HEART RATE: 74 BPM | HEIGHT: 72 IN | WEIGHT: 273.6 LBS | OXYGEN SATURATION: 97 % | TEMPERATURE: 97.9 F

## 2023-01-09 DIAGNOSIS — E78.00 PURE HYPERCHOLESTEROLEMIA: ICD-10-CM

## 2023-01-09 DIAGNOSIS — I10 PRIMARY HYPERTENSION: Primary | ICD-10-CM

## 2023-01-09 DIAGNOSIS — F41.1 GENERALIZED ANXIETY DISORDER: ICD-10-CM

## 2023-01-09 DIAGNOSIS — R73.03 PREDIABETES: ICD-10-CM

## 2023-01-09 DIAGNOSIS — F17.200 TOBACCO DEPENDENCE: ICD-10-CM

## 2023-01-09 DIAGNOSIS — I10 PRIMARY HYPERTENSION: ICD-10-CM

## 2023-01-09 LAB
A/G RATIO: 1.8 (ref 1.1–2.2)
ALBUMIN SERPL-MCNC: 4.1 G/DL (ref 3.4–5)
ALP BLD-CCNC: 99 U/L (ref 40–129)
ALT SERPL-CCNC: 24 U/L (ref 10–40)
ANION GAP SERPL CALCULATED.3IONS-SCNC: 12 MMOL/L (ref 3–16)
AST SERPL-CCNC: 19 U/L (ref 15–37)
BILIRUB SERPL-MCNC: 0.3 MG/DL (ref 0–1)
BUN BLDV-MCNC: 16 MG/DL (ref 7–20)
CALCIUM SERPL-MCNC: 9.5 MG/DL (ref 8.3–10.6)
CHLORIDE BLD-SCNC: 103 MMOL/L (ref 99–110)
CHOLESTEROL, TOTAL: 156 MG/DL (ref 0–199)
CO2: 23 MMOL/L (ref 21–32)
CREAT SERPL-MCNC: 0.8 MG/DL (ref 0.9–1.3)
GFR SERPL CREATININE-BSD FRML MDRD: >60 ML/MIN/{1.73_M2}
GLUCOSE BLD-MCNC: 87 MG/DL (ref 70–99)
HDLC SERPL-MCNC: 41 MG/DL (ref 40–60)
LDL CHOLESTEROL CALCULATED: 90 MG/DL
POTASSIUM SERPL-SCNC: 4.5 MMOL/L (ref 3.5–5.1)
SODIUM BLD-SCNC: 138 MMOL/L (ref 136–145)
TOTAL PROTEIN: 6.4 G/DL (ref 6.4–8.2)
TRIGL SERPL-MCNC: 126 MG/DL (ref 0–150)
VLDLC SERPL CALC-MCNC: 25 MG/DL

## 2023-01-09 PROCEDURE — 4004F PT TOBACCO SCREEN RCVD TLK: CPT | Performed by: FAMILY MEDICINE

## 2023-01-09 PROCEDURE — G8484 FLU IMMUNIZE NO ADMIN: HCPCS | Performed by: FAMILY MEDICINE

## 2023-01-09 PROCEDURE — 3078F DIAST BP <80 MM HG: CPT | Performed by: FAMILY MEDICINE

## 2023-01-09 PROCEDURE — 99214 OFFICE O/P EST MOD 30 MIN: CPT | Performed by: FAMILY MEDICINE

## 2023-01-09 PROCEDURE — 80053 COMPREHEN METABOLIC PANEL: CPT

## 2023-01-09 PROCEDURE — 80061 LIPID PANEL: CPT

## 2023-01-09 PROCEDURE — 83036 HEMOGLOBIN GLYCOSYLATED A1C: CPT

## 2023-01-09 PROCEDURE — 3017F COLORECTAL CA SCREEN DOC REV: CPT | Performed by: FAMILY MEDICINE

## 2023-01-09 PROCEDURE — 90674 CCIIV4 VAC NO PRSV 0.5 ML IM: CPT | Performed by: FAMILY MEDICINE

## 2023-01-09 PROCEDURE — 90471 IMMUNIZATION ADMIN: CPT | Performed by: FAMILY MEDICINE

## 2023-01-09 PROCEDURE — 3074F SYST BP LT 130 MM HG: CPT | Performed by: FAMILY MEDICINE

## 2023-01-09 PROCEDURE — G8427 DOCREV CUR MEDS BY ELIG CLIN: HCPCS | Performed by: FAMILY MEDICINE

## 2023-01-09 PROCEDURE — 36415 COLL VENOUS BLD VENIPUNCTURE: CPT

## 2023-01-09 PROCEDURE — G8417 CALC BMI ABV UP PARAM F/U: HCPCS | Performed by: FAMILY MEDICINE

## 2023-01-09 RX ORDER — NICOTINE 21 MG/24HR
1 PATCH, TRANSDERMAL 24 HOURS TRANSDERMAL EVERY 24 HOURS
Qty: 28 PATCH | Refills: 2 | Status: SHIPPED | OUTPATIENT
Start: 2023-01-09 | End: 2024-01-09

## 2023-01-09 RX ORDER — LISINOPRIL AND HYDROCHLOROTHIAZIDE 25; 20 MG/1; MG/1
TABLET ORAL
Qty: 90 TABLET | Refills: 1 | Status: SHIPPED | OUTPATIENT
Start: 2023-01-09

## 2023-01-09 RX ORDER — ESCITALOPRAM OXALATE 5 MG/1
TABLET ORAL
Qty: 90 TABLET | Refills: 0 | Status: SHIPPED | OUTPATIENT
Start: 2023-01-09

## 2023-01-09 ASSESSMENT — PATIENT HEALTH QUESTIONNAIRE - PHQ9
9. THOUGHTS THAT YOU WOULD BE BETTER OFF DEAD, OR OF HURTING YOURSELF: 0
SUM OF ALL RESPONSES TO PHQ QUESTIONS 1-9: 0
7. TROUBLE CONCENTRATING ON THINGS, SUCH AS READING THE NEWSPAPER OR WATCHING TELEVISION: 0
SUM OF ALL RESPONSES TO PHQ QUESTIONS 1-9: 0
8. MOVING OR SPEAKING SO SLOWLY THAT OTHER PEOPLE COULD HAVE NOTICED. OR THE OPPOSITE, BEING SO FIGETY OR RESTLESS THAT YOU HAVE BEEN MOVING AROUND A LOT MORE THAN USUAL: 0
6. FEELING BAD ABOUT YOURSELF - OR THAT YOU ARE A FAILURE OR HAVE LET YOURSELF OR YOUR FAMILY DOWN: 0
SUM OF ALL RESPONSES TO PHQ QUESTIONS 1-9: 0
SUM OF ALL RESPONSES TO PHQ QUESTIONS 1-9: 0
5. POOR APPETITE OR OVEREATING: 0
1. LITTLE INTEREST OR PLEASURE IN DOING THINGS: 0
4. FEELING TIRED OR HAVING LITTLE ENERGY: 0
10. IF YOU CHECKED OFF ANY PROBLEMS, HOW DIFFICULT HAVE THESE PROBLEMS MADE IT FOR YOU TO DO YOUR WORK, TAKE CARE OF THINGS AT HOME, OR GET ALONG WITH OTHER PEOPLE: 0
3. TROUBLE FALLING OR STAYING ASLEEP: 0
SUM OF ALL RESPONSES TO PHQ9 QUESTIONS 1 & 2: 0
2. FEELING DOWN, DEPRESSED OR HOPELESS: 0

## 2023-01-09 ASSESSMENT — ANXIETY QUESTIONNAIRES
5. BEING SO RESTLESS THAT IT IS HARD TO SIT STILL: 0
IF YOU CHECKED OFF ANY PROBLEMS ON THIS QUESTIONNAIRE, HOW DIFFICULT HAVE THESE PROBLEMS MADE IT FOR YOU TO DO YOUR WORK, TAKE CARE OF THINGS AT HOME, OR GET ALONG WITH OTHER PEOPLE: NOT DIFFICULT AT ALL
3. WORRYING TOO MUCH ABOUT DIFFERENT THINGS: 1
4. TROUBLE RELAXING: 0
GAD7 TOTAL SCORE: 2
1. FEELING NERVOUS, ANXIOUS, OR ON EDGE: 1
6. BECOMING EASILY ANNOYED OR IRRITABLE: 0
2. NOT BEING ABLE TO STOP OR CONTROL WORRYING: 0
7. FEELING AFRAID AS IF SOMETHING AWFUL MIGHT HAPPEN: 0

## 2023-01-09 NOTE — PROGRESS NOTES
PROGRESS NOTE     Zita Youngblood MD  [address] [facility]                                  [address] [facility], [address] [place] [address],[address], Hellen Macedo         Phone: [phone]    Date of Service:  1/9/2023     Patient ID: .Beauty Bumpers is a 47 y.o. male      Assessment / Plan:     Primary HTN  At goal.  Patient to continue lisinopril/hydrochlorothiazide 20/25 mg daily. Checking CMP. Prediabetes  Unfortunately, patient has gained back 23 pounds. He feels the stress of his friend's death caused him to change back to a lot of unhealthy habits. We discussed using exercise and healthy habits as a way of dealing with stress. Patient is motivated to make some changes. Checking A1c for reevaluation. Anxiety  Restarting Lexapro 5 mg. Patient's PHQ-9 and PERLA-7 were both very low scores, however; when discussing with patient, he still worries about many things. He has a lot of financial stresses, and anytime he has any physical symptoms of any kind, no matter how small, he worries it may be something serious. Patient not interested in counseling at this point, and states does not have time in his busy work schedule. Tob dep   Not controlled, back up to half pack per day. Restarting nicotine 14 mg patches, as these helped greatly. HLD  Not completely fasting, but only had toast today. Checking lipid panel and will adjust Lipitor 20 mg if necessary. Possible RUPINDER  Referred to sleep doctor at last couple of appointment. Encouraged patient to schedule once again, and gave number to do so. HM:  Get Tdap, shingrix, pneumovax and moderna booster at pharmacy (as medicaid)    Flu vaccine given today with VIS forms    Referred to GI for colonoscopy at last visit 2 visits. Giving number to schedule again. (Had precancerous polyps in the past and is overdue.   Patient understands risk of morbidity mortality if he fails to get screening completed. He has not candidate for stool test.)     Hep C and HIV screens: negative        F/u in 6 months for all chronic med conditions. Subjective:     CC: Primary hypertension, prediabetes, generalized anxiety disorder, tobacco dependence, hyperlipidemia     HPI    50-year-old white male here for follow-up of the above chronic medical conditions. During , patient was able to lose 20 pounds with lifestyle change. This caused improvement in both his blood pressure and his prediabetes. Patient states he was doing well with these lifestyle changes until 8 weeks ago when his best friend  unexpectedly and suddenly. At this point, he stopped trying to make healthy lifestyle decisions, has gained back 23 pounds and he also started smoking more. He was down to 4 cigarettes daily with the help of NicoDerm patches, but failed to get a refill when they ran out. He is back up to half a pack per day currently. Patient's PHQ-9 is 0 and PERLA-7 is 2. He states he feels anxious several days a week and worries too much about different things several days a week. On further discussion, it sounds that he ruminates a lot more than that. We have tried to treat him with Lexapro 5 mg on many different occasions, but he worries about starting new medicines, and worries about side effects. He is asking for refill today, stating he would like to give this a try. Patient has been compliant with his daily blood pressure meds, but admits that he has not been taking his statin consistently since his friend . He denies side effects from either 1. Patient still has not been compliant with following up with sleep doctor for sleep study, nor has he been compliant with following up for colonoscopy. Patient states he had precancerous polyps on initial colonoscopy, and believes he is overdue for repeat colonoscopy. Patient denies symptoms of depression.   No depressed mood, anhedonia feelings of hopelessness or worthlessness, suicidal homicidal ideation. No panic attacks. External stressors:  Works 2-3 jobs. 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 Friend unexpectedly  8 weeks ago. Lab Results   Component Value Date    LABA1C 5.7 2022    LABA1C 5.3 2021     Lab Results   Component Value Date    LABMICR YES 2022    CREATININE 0.8 (L) 10/18/2022     Lab Results   Component Value Date    ALT 21 10/18/2022    AST 16 10/18/2022     Lab Results   Component Value Date    CHOL 161 2022    TRIG 92 2022    HDL 43 2022    LDLCALC 100 (H) 2022          ROS:    See hpi      Vitals:    23 1306   BP: 124/76   Site: Left Upper Arm   Position: Sitting   Cuff Size: Large Adult   Pulse: 74   Resp: 18   Temp: 97.9 °F (36.6 °C)   TempSrc: Temporal   SpO2: 97%   Weight: 273 lb 9.6 oz (124.1 kg)   Height: 6' (1.829 m)         Outpatient Medications Marked as Taking for the 23 encounter (Office Visit) with Ross Edmond MD   Medication Sig Dispense Refill    lisinopril-hydroCHLOROthiazide (PRINZIDE;ZESTORETIC) 20-25 MG per tablet TAKE 1 TABLET BY MOUTH EVERY DAY 90 tablet 1    atorvastatin (LIPITOR) 20 MG tablet Take 1 tablet by mouth in the morning. 90 tablet 3             Objective:   Constitutional:   Reviewed vitals above  Well Nourished, well developed, no distress       Resp:  Normal effort  Clear to auscultation bilaterally without rhonchi, wheezing or crackles  Cardiovascular:   On auscultation, normal S1 and S2 without murmurs, rubs or gallops  No bruits of bilateral carotids and no JVD  Gastrointestinal:  Nontender, nondistended, and no masses  No hepatosplenomegaly  Musculoskeletal:  Normal Gait  All extremities without clubbing, cyanosis or edema  Skin:  No rashes on inspection  No areas of increased heat or induration on palpation  Psych:  Normal mood and affect  Normal insight and judgement          EMR Dragon/transcription disclaimer:  Much of this encounter note is electronic transcription/translation of spoken language to printed texts. The electronic translation of spoken language may be erroneous, or at times, nonsensical words or phrases may be inadvertently transcribed.   Although I have reviewed the note for such errors, some may still exist.

## 2023-01-09 NOTE — PATIENT INSTRUCTIONS
Get Tdap, pneumovax,  and Shingrix vaccines at [address], [address]  Ph: [phone]    Read about the DASH diet    Goal:  150min walking weekly.     [facility]  [address] [facility], [address] [place] [address]  Phone: [phone]

## 2023-01-10 LAB
ESTIMATED AVERAGE GLUCOSE: 108.3 MG/DL
HBA1C MFR BLD: 5.4 %

## 2023-02-04 ENCOUNTER — APPOINTMENT (OUTPATIENT)
Dept: GENERAL RADIOLOGY | Age: 53
End: 2023-02-04
Payer: COMMERCIAL

## 2023-02-04 ENCOUNTER — HOSPITAL ENCOUNTER (EMERGENCY)
Age: 53
Discharge: HOME | End: 2023-02-04
Attending: STUDENT IN AN ORGANIZED HEALTH CARE EDUCATION/TRAINING PROGRAM
Payer: COMMERCIAL

## 2023-02-04 VITALS
OXYGEN SATURATION: 100 % | SYSTOLIC BLOOD PRESSURE: 150 MMHG | HEART RATE: 94 BPM | RESPIRATION RATE: 18 BRPM | WEIGHT: 279 LBS | BODY MASS INDEX: 37.79 KG/M2 | DIASTOLIC BLOOD PRESSURE: 99 MMHG | TEMPERATURE: 97.6 F | HEIGHT: 72 IN

## 2023-02-04 DIAGNOSIS — G89.29 CHRONIC MIDLINE LOW BACK PAIN WITHOUT SCIATICA: Primary | ICD-10-CM

## 2023-02-04 DIAGNOSIS — M54.50 CHRONIC MIDLINE LOW BACK PAIN WITHOUT SCIATICA: Primary | ICD-10-CM

## 2023-02-04 PROCEDURE — 96372 THER/PROPH/DIAG INJ SC/IM: CPT

## 2023-02-04 PROCEDURE — 6360000002 HC RX W HCPCS: Performed by: STUDENT IN AN ORGANIZED HEALTH CARE EDUCATION/TRAINING PROGRAM

## 2023-02-04 PROCEDURE — 99284 EMERGENCY DEPT VISIT MOD MDM: CPT

## 2023-02-04 PROCEDURE — 74018 RADEX ABDOMEN 1 VIEW: CPT

## 2023-02-04 RX ORDER — KETOROLAC TROMETHAMINE 10 MG/1
10 TABLET, FILM COATED ORAL EVERY 6 HOURS PRN
Qty: 20 TABLET | Refills: 0 | Status: SHIPPED | OUTPATIENT
Start: 2023-02-04 | End: 2024-02-04

## 2023-02-04 RX ORDER — KETOROLAC TROMETHAMINE 30 MG/ML
30 INJECTION, SOLUTION INTRAMUSCULAR; INTRAVENOUS ONCE
Status: DISCONTINUED | OUTPATIENT
Start: 2023-02-04 | End: 2023-02-04

## 2023-02-04 RX ORDER — KETOROLAC TROMETHAMINE 30 MG/ML
15 INJECTION, SOLUTION INTRAMUSCULAR; INTRAVENOUS ONCE
Status: COMPLETED | OUTPATIENT
Start: 2023-02-04 | End: 2023-02-04

## 2023-02-04 RX ADMIN — KETOROLAC TROMETHAMINE 15 MG: 30 INJECTION, SOLUTION INTRAMUSCULAR; INTRAVENOUS at 15:07

## 2023-02-04 ASSESSMENT — PAIN DESCRIPTION - LOCATION
LOCATION: BACK

## 2023-02-04 ASSESSMENT — PAIN SCALES - GENERAL
PAINLEVEL_OUTOF10: 2
PAINLEVEL_OUTOF10: 4
PAINLEVEL_OUTOF10: 4

## 2023-02-04 ASSESSMENT — PAIN DESCRIPTION - ORIENTATION
ORIENTATION: RIGHT;LEFT
ORIENTATION: MID

## 2023-02-04 ASSESSMENT — PAIN - FUNCTIONAL ASSESSMENT
PAIN_FUNCTIONAL_ASSESSMENT: 0-10

## 2023-02-04 ASSESSMENT — PAIN DESCRIPTION - DESCRIPTORS: DESCRIPTORS: ACHING

## 2023-02-04 NOTE — ED PROVIDER NOTES
I independently performed a history and physical on Voldonato Cherie. All diagnostic, treatment, and disposition decisions were made by myself in conjunction with the advanced practice provider/resident. Labs Reviewed - No data to display  XR ABDOMEN (KUB) (SINGLE AP VIEW)   Final Result   Unremarkable appearing abdomen           For further details of [facility] emergency department encounter, please see the AMBER/resident's documentation. I personally saw the patient and performed a substantive portion of the visit including all aspects of the medical decision making. Briefly, this is a 58-year-old male with history of degenerative disc disease, presenting with concerns for acute on chronic low back pain, also with abdominal bloating. Patient states that he lifts at work because he is a . States that it usually takes him about 2 days for his back pain to resolve after a shift. He states that this time, he feels that he is not really getting any better. There is nothing new or different about the pain aside from how long it is lasting. He denies radiation to his legs. He denies fevers, IV drug use, saddle anesthesia, bowel or bladder incontinence or other red flag symptoms for his back pain. Denies urinary symptoms. He was seen in urgent care 2 days ago and started on steroids and muscle relaxers but states they have not really been helping. He has not been able to get into see a spine specialist due to his insurance. Physical exam shows normal neurologic exam, he does have some mild tenderness to palpation in the lumbar spine. No step-offs or obvious deformities. Abdomen is soft, nondistended, nontender. KUB was performed which was reassuring. Patient is not taking any Tylenol or NSAIDs, did advise him on taking these in addition to his current medications and attempting to follow-up with orthopedic spine.   He is comfortable and in agreement with plan of care and was discharged. Given return precautions. I personally saw the patient and independently provided 0 minutes of non-concurrent critical care out of the total shared critical care time provided. I, Dr. Vero Velez, am the primary clinician of record. 1. Chronic midline low back pain without sciatica        Comment: Please note this report has been produced using speech recognition software and may contain errors related to that system including errors in grammar, punctuation, and spelling, as well as words and phrases that may be inappropriate. If there are any questions or concerns please feel free to contact the dictating provider for clarification.        Jaycob Phelps MD  02/04/23 6017

## 2023-02-04 NOTE — ED PROVIDER NOTES
[facility]  ED  [address]        Patient Name: Lisa Mueller  MRN: [id number]  [place] 1970  Date of evaluation: 2/4/2023  Attending Provider: Katie Santos MD  Resident Provider: Patti Delgado MD, PhD   PCP: Allyson Rivas MD  Note Started: 3:10 PM EST 2/4/23    CHIEF COMPLAINT       Back Pain (Started Monday, pt sts hx of DDD, no numbness/tingling in legs, no issues with incontinent of bowel or bladder, lower back pain, pt also sts abd pain when pressing, went to urgent care where they gave him muscle relaxer's and steroids that didn't help )      HISTORY OF PRESENT ILLNESS: 1 or more Elements     History from : Patient    Limitations to history : None    Lisa Mueller is a 47 y.o. male who presents for chronic back pain    - hx of chronic back pain 2/2 DDD  - unable to see spine doctors due to insurance problem  - works as a  at a bar  - worked past weekend and noticed worsening back pain on Monday  - denies numbness/tingling or decreased strength of limbs  - denies bowel or bladder incontinence  - pain starts mid back and continues to sacrum without radiating to legs  - went to urgent care and received muscle relaxer and steroid med  - states that he also noticed distended abdomen but had normal BM today  - reports of diffuse abdominal discomfort    Nursing Notes were all reviewed and agreed with or any disagreements were addressed in the HPI. REVIEW OF SYSTEMS :      Positives and Pertinent negatives as per HPI. SURGICAL HISTORY     Past Surgical History:   Procedure Laterality Date    KIDNEY STONE SURGERY         CURRENTMEDICATIONS       Previous Medications    ATORVASTATIN (LIPITOR) 20 MG TABLET    Take 1 tablet by mouth in the morning.     ESCITALOPRAM (LEXAPRO) 5 MG TABLET    TAKE 1 TABLET BY MOUTH EVERY DAY    LISINOPRIL-HYDROCHLOROTHIAZIDE (PRINZIDE;ZESTORETIC) 20-25 MG PER TABLET    TAKE 1 TABLET BY MOUTH EVERY DAY NICOTINE (Елена Emilio) 14 MG/24HR    Place 1 patch onto the skin every 24 hours       ALLERGIES     Ciprofloxacin    FAMILYHISTORY       Family History   Problem Relation Age of Onset    Heart Disease Mother     Arrhythmia Mother     Diabetes Mother     Leukemia Father     Diabetes Maternal Grandmother     No Known Problems Paternal Grandmother     No Known Problems Paternal Grandfather         SOCIAL HISTORY       Social History     Tobacco Use    Smoking status: Every Day     Packs/day: 0.50     Years: 25.00     Pack years: 12.50     Types: Cigarettes    Smokeless tobacco: Never    Tobacco comments:         Vaping Use    Vaping Use: Never used   Substance Use Topics    Alcohol use: Yes     Comment: 3 drinks every other weekend    Drug use: No       SCREENINGS                         CIWA Assessment  BP: (!) 150/99  Heart Rate: 94           PHYSICAL EXAM  1 or more Elements     ED Triage Vitals [02/04/23 1351]   BP Temp Temp Source Heart Rate Resp SpO2 Height Weight   (!) 150/99 97.6 °F (36.4 °C) Oral 94 18 100 % 6' (1.829 m) 279 lb (126.6 kg)       General: No acute distress. Alert and Oriented. Appears stated age. HEENT: No midline cervical spine tenderness. Full ROM of the neck. There is no significant cervical lympadenopathy. No difficulty tolerating oral secretions. Cardiac: Regular rate and rhythm. Radial pulses are intact bilaterally. Chest: No respiratory distress. Clear breath sounds bilaterally. No increased work of breathing. No use of accessory muscles for respiration. Abdomen: Soft, nontender, nondistended, non-peritonitic. Extremities:No significant lower extremity edema. Lower extremities are symmetric. Neuro: Moving all extremities. No focal deficits. Speech is clear. Skin:No rash, no erythema  Psych: Calm and cooperative. DIAGNOSTIC RESULTS   LABS:    Labs Reviewed - No data to display    When ordered only abnormal lab results are displayed.  All other labs were within normal range or not returned as of this dictation. EKG    - not indicated      RADIOLOGY:   Non-plain film images such as CT, Ultrasound and MRI are read by the radiologist. Plain radiographic images are visualized and preliminarily interpreted by the ED Provider with the below findings:    KUB: normal appearing abdomen, notable degenerative disc on lumbar spine    Interpretation per the Radiologist below, if available at the time of this note:    XR ABDOMEN (KUB) (SINGLE AP VIEW)   Final Result   Unremarkable appearing abdomen           No results found. Bedside Ultrasound, as interpreted by me, if performed:    No results found. PROCEDURES     Unless otherwise noted below, none     Procedures    CRITICAL CARE TIME     I personally spent a total of 60 minutes of critical care time in obtaining history, performing a physical exam, bedside monitoring of interventions, collecting and interpreting tests and discussion with consultants but excluding time spent performing procedures, treating other patients and teaching time. PAST MEDICAL HISTORY      has a past medical history of Anxiety, Chronic back  and neck pain, Degenerative disc disease, Diabetes mellitus ([address] Diverticulosis, Fatty liver, H/O renal calculi, Hyperlipidemia, Hypertension, MDD (major depressive disorder), Obesity, Prediabetes, Pure hypercholesterolemia, Scoliosis, and Tobacco dependence.      EMERGENCY DEPARTMENT COURSE and DIFFERENTIAL DIAGNOSIS/MDM:     Vitals:    Vitals:    02/04/23 1351   BP: (!) 150/99   Pulse: 94   Resp: 18   Temp: 97.6 °F (36.4 °C)   TempSrc: Oral   SpO2: 100%   Weight: 279 lb (126.6 kg)   Height: 6' (1.829 m)       Patient was treated with and given the following medications:  Medications   ketorolac (TORADOL) injection 15 mg (15 mg IntraMUSCular Given 2/4/23 1507)       ED Course as of 02/04/23 1600   Sat Feb 04, 2023 1523 XR ABDOMEN (KUB) (SINGLE AP VIEW) [SO]      ED Course User Index  [SO] Debby Simon MD PhD        Is this patient to be included in the SEP-1 Core Measure due to severe sepsis or septic shock? No Exclusion criteria - the patient is NOT to be included for SEP-1 Core Measure due to: 2+ SIRS criteria are not met    CC/HPI Summary, DDx, ED Course, and Reassessment:     Shanda oMrillo is a 50-year-old male with history of chronic back pain 2/2 DDD for many years presenting for worsening back pain. The patient works as a  and worked past weekend when he noticed worsening back pain afterward on Monday. He went to urgent care and received muscle relaxer and steroid which he reports has been taking recently and has not yet seen any benefits at this time. The patient states that he experiences no numbness or tingling of extremities. Denies changes in bowel or bladder control. Pain is described as sharp and ache starting in the mid back along the spine to sacrum without radiating toward legs. Able to ambulate and have full strength/sensation on exam. Patient also reports of distended abdomen and feeling \"hard\" but reports of having a normal BM today. On exam, active bowel sounds and non-tender to palp in all 4 quadrants. No focal tenderness along the spine or step-off. No focal neurologic deficits. Of note, the patient ws recently seen several months ago at [address] at [facility] for similar complaints and CTAP at that time was unremarkable. Given the patient's worsening pain, he was given toradol once in ED. Given his history and benign abd exam in the context of normal recent CTAP, low suspicion for acute intra-abdominal pathology, however given patient's concern for acute distension and discomfort KUB was ordered which showed normal appearing abdomen with notable degenerative disc of lumbar spine.  The patient was updated on the results and advised to follow-up OP ortho who would accept his insurance to better manage his chronic back pain. CONSULTS: (Who and What was discussed)  None    Discussion with Other Professionals : None    Social Determinants : None    Patient's care impacted by chronic condition(s): none    Records Reviewed : None    I considered CT spine but did not after shared decision making with patient due to no s/sx of spine fracture or dislocation on exam and plans for OP f/u    Appropriate for outpatient management PCP and ortho f/u    I am the Primary Clinician of Record. FINAL IMPRESSION      1. Chronic midline low back pain without sciatica          DISPOSITION/PLAN     DISPOSITION Decision To Discharge 02/04/2023 03:44:29 PM      PATIENT REFERRED TO:  [facility]  ED  [address] [facility] [address] [phone]    If symptoms worsen    Leigh Ann Mason MD  [address] [place] [address] [address]  [phone]    Schedule an appointment as soon as possible for a visit   As needed    Orthopedic Spine  Please call your insurance to find a provider who will accept your insurance  Schedule an appointment as soon as possible for a visit       DISCHARGE MEDICATIONS:  Patient was given scripts for the following medications. I counseled patient how to take these medications:  New Prescriptions    KETOROLAC (TORADOL) 10 MG TABLET    Take 1 tablet by mouth every 6 hours as needed for Pain       DISCONTINUED MEDICATIONS:  Discontinued Medications    No medications on file              (This chart was generated in part by using Dragon Dictation system and may contain errors related to that system including errors in grammar, punctuation, and spelling, as well as words and phrases that may be inappropriate.  If there are any questions or concerns please feel free to contact the dictating provider for clarification.)    Shalini Evans MD, PhD  Family Medicine, PGY-1       Shalini Evans MD PhD  Resident  02/04/23 1600

## 2023-02-27 RX ORDER — PANTOPRAZOLE SODIUM 40 MG/1
TABLET, DELAYED RELEASE ORAL
Qty: 30 TABLET | Refills: 1 | OUTPATIENT
Start: 2023-02-27

## 2023-03-05 ENCOUNTER — APPOINTMENT (OUTPATIENT)
Dept: CT IMAGING | Age: 53
End: 2023-03-05
Payer: COMMERCIAL

## 2023-03-05 ENCOUNTER — HOSPITAL ENCOUNTER (EMERGENCY)
Age: 53
Discharge: HOME | End: 2023-03-05
Payer: COMMERCIAL

## 2023-03-05 VITALS
HEART RATE: 86 BPM | TEMPERATURE: 98 F | HEIGHT: 72 IN | WEIGHT: 270 LBS | BODY MASS INDEX: 36.57 KG/M2 | SYSTOLIC BLOOD PRESSURE: 111 MMHG | OXYGEN SATURATION: 97 % | RESPIRATION RATE: 16 BRPM | DIASTOLIC BLOOD PRESSURE: 54 MMHG

## 2023-03-05 DIAGNOSIS — R10.31 RIGHT LOWER QUADRANT ABDOMINAL PAIN: Primary | ICD-10-CM

## 2023-03-05 DIAGNOSIS — R11.0 NAUSEA: ICD-10-CM

## 2023-03-05 LAB
A/G RATIO: 1.9 (ref 1.1–2.2)
ALBUMIN SERPL-MCNC: 4.5 G/DL (ref 3.4–5)
ALP BLD-CCNC: 110 U/L (ref 40–129)
ALT SERPL-CCNC: 26 U/L (ref 10–40)
ANION GAP SERPL CALCULATED.3IONS-SCNC: 10 MMOL/L (ref 3–16)
AST SERPL-CCNC: 24 U/L (ref 15–37)
BASOPHILS ABSOLUTE: 0.1 K/UL (ref 0–0.2)
BASOPHILS RELATIVE PERCENT: 0.8 %
BILIRUB SERPL-MCNC: 0.4 MG/DL (ref 0–1)
BILIRUBIN URINE: NEGATIVE
BLOOD, URINE: ABNORMAL
BUN BLDV-MCNC: 17 MG/DL (ref 7–20)
CALCIUM SERPL-MCNC: 9.4 MG/DL (ref 8.3–10.6)
CHLORIDE BLD-SCNC: 97 MMOL/L (ref 99–110)
CLARITY: CLEAR
CO2: 28 MMOL/L (ref 21–32)
COLOR: YELLOW
CREAT SERPL-MCNC: 0.8 MG/DL (ref 0.9–1.3)
EOSINOPHILS ABSOLUTE: 0.2 K/UL (ref 0–0.6)
EOSINOPHILS RELATIVE PERCENT: 2.1 %
GFR SERPL CREATININE-BSD FRML MDRD: >60 ML/MIN/{1.73_M2}
GLUCOSE BLD-MCNC: 101 MG/DL (ref 70–99)
GLUCOSE URINE: NEGATIVE MG/DL
HCT VFR BLD CALC: 46.9 % (ref 40.5–52.5)
HEMOGLOBIN: 16.1 G/DL (ref 13.5–17.5)
KETONES, URINE: NEGATIVE MG/DL
LEUKOCYTE ESTERASE, URINE: NEGATIVE
LIPASE: 37 U/L (ref 13–60)
LYMPHOCYTES ABSOLUTE: 2.1 K/UL (ref 1–5.1)
LYMPHOCYTES RELATIVE PERCENT: 25.8 %
MCH RBC QN AUTO: 34.2 PG (ref 26–34)
MCHC RBC AUTO-ENTMCNC: 34.4 G/DL (ref 31–36)
MCV RBC AUTO: 99.5 FL (ref 80–100)
MICROSCOPIC EXAMINATION: YES
MONOCYTES ABSOLUTE: 0.6 K/UL (ref 0–1.3)
MONOCYTES RELATIVE PERCENT: 7.1 %
NEUTROPHILS ABSOLUTE: 5.1 K/UL (ref 1.7–7.7)
NEUTROPHILS RELATIVE PERCENT: 64.2 %
NITRITE, URINE: NEGATIVE
PDW BLD-RTO: 13 % (ref 12.4–15.4)
PH UA: 6 (ref 5–8)
PLATELET # BLD: 284 K/UL (ref 135–450)
PMV BLD AUTO: 6.4 FL (ref 5–10.5)
POTASSIUM REFLEX MAGNESIUM: 4 MMOL/L (ref 3.5–5.1)
PRO-BNP: 16 PG/ML (ref 0–124)
PROTEIN UA: NEGATIVE MG/DL
RBC # BLD: 4.71 M/UL (ref 4.2–5.9)
RBC UA: ABNORMAL /HPF (ref 0–4)
SODIUM BLD-SCNC: 135 MMOL/L (ref 136–145)
SPECIFIC GRAVITY UA: 1.02 (ref 1–1.03)
SPECIMEN STATUS: NORMAL
TOTAL PROTEIN: 6.9 G/DL (ref 6.4–8.2)
TROPONIN: <0.01 NG/ML
URINE REFLEX TO CULTURE: ABNORMAL
URINE TYPE: ABNORMAL
UROBILINOGEN, URINE: 0.2 E.U./DL
WBC # BLD: 7.9 K/UL (ref 4–11)
WBC UA: ABNORMAL /HPF (ref 0–5)

## 2023-03-05 PROCEDURE — 2580000003 HC RX 258: Performed by: PHYSICIAN ASSISTANT

## 2023-03-05 PROCEDURE — 74176 CT ABD & PELVIS W/O CONTRAST: CPT

## 2023-03-05 PROCEDURE — 96375 TX/PRO/DX INJ NEW DRUG ADDON: CPT

## 2023-03-05 PROCEDURE — 81001 URINALYSIS AUTO W/SCOPE: CPT

## 2023-03-05 PROCEDURE — 83880 ASSAY OF NATRIURETIC PEPTIDE: CPT

## 2023-03-05 PROCEDURE — 6370000000 HC RX 637 (ALT 250 FOR IP): Performed by: PHYSICIAN ASSISTANT

## 2023-03-05 PROCEDURE — 84484 ASSAY OF TROPONIN QUANT: CPT

## 2023-03-05 PROCEDURE — 2500000003 HC RX 250 WO HCPCS: Performed by: PHYSICIAN ASSISTANT

## 2023-03-05 PROCEDURE — 80053 COMPREHEN METABOLIC PANEL: CPT

## 2023-03-05 PROCEDURE — 6360000002 HC RX W HCPCS: Performed by: PHYSICIAN ASSISTANT

## 2023-03-05 PROCEDURE — 83690 ASSAY OF LIPASE: CPT

## 2023-03-05 PROCEDURE — 93005 ELECTROCARDIOGRAM TRACING: CPT | Performed by: PHYSICIAN ASSISTANT

## 2023-03-05 PROCEDURE — 85025 COMPLETE CBC W/AUTO DIFF WBC: CPT

## 2023-03-05 PROCEDURE — 99284 EMERGENCY DEPT VISIT MOD MDM: CPT

## 2023-03-05 PROCEDURE — 96374 THER/PROPH/DIAG INJ IV PUSH: CPT

## 2023-03-05 RX ORDER — DICYCLOMINE HYDROCHLORIDE 10 MG/1
10 CAPSULE ORAL ONCE
Status: COMPLETED | OUTPATIENT
Start: 2023-03-05 | End: 2023-03-05

## 2023-03-05 RX ORDER — ONDANSETRON 4 MG/1
4 TABLET, FILM COATED ORAL EVERY 8 HOURS PRN
Qty: 20 TABLET | Refills: 0 | Status: SHIPPED | OUTPATIENT
Start: 2023-03-05

## 2023-03-05 RX ORDER — 0.9 % SODIUM CHLORIDE 0.9 %
1000 INTRAVENOUS SOLUTION INTRAVENOUS ONCE
Status: COMPLETED | OUTPATIENT
Start: 2023-03-05 | End: 2023-03-05

## 2023-03-05 RX ORDER — DICYCLOMINE HYDROCHLORIDE 10 MG/1
10 CAPSULE ORAL 2 TIMES DAILY PRN
Qty: 14 CAPSULE | Refills: 0 | Status: SHIPPED | OUTPATIENT
Start: 2023-03-05

## 2023-03-05 RX ORDER — ONDANSETRON 2 MG/ML
4 INJECTION INTRAMUSCULAR; INTRAVENOUS ONCE
Status: COMPLETED | OUTPATIENT
Start: 2023-03-05 | End: 2023-03-05

## 2023-03-05 RX ORDER — KETOROLAC TROMETHAMINE 30 MG/ML
15 INJECTION, SOLUTION INTRAMUSCULAR; INTRAVENOUS ONCE
Status: COMPLETED | OUTPATIENT
Start: 2023-03-05 | End: 2023-03-05

## 2023-03-05 RX ORDER — FAMOTIDINE 40 MG/1
40 TABLET, FILM COATED ORAL DAILY
Qty: 10 TABLET | Refills: 0 | Status: SHIPPED | OUTPATIENT
Start: 2023-03-05 | End: 2023-03-15

## 2023-03-05 RX ORDER — FAMOTIDINE 10 MG/ML
20 INJECTION, SOLUTION INTRAVENOUS ONCE
Status: COMPLETED | OUTPATIENT
Start: 2023-03-05 | End: 2023-03-05

## 2023-03-05 RX ADMIN — FAMOTIDINE 20 MG: 10 INJECTION, SOLUTION INTRAVENOUS at 18:47

## 2023-03-05 RX ADMIN — DICYCLOMINE HYDROCHLORIDE 10 MG: 10 CAPSULE ORAL at 20:04

## 2023-03-05 RX ADMIN — ONDANSETRON 4 MG: 2 INJECTION INTRAMUSCULAR; INTRAVENOUS at 18:47

## 2023-03-05 RX ADMIN — SODIUM CHLORIDE 1000 ML: 9 INJECTION, SOLUTION INTRAVENOUS at 18:46

## 2023-03-05 RX ADMIN — KETOROLAC TROMETHAMINE 15 MG: 30 INJECTION, SOLUTION INTRAMUSCULAR; INTRAVENOUS at 18:47

## 2023-03-05 ASSESSMENT — ENCOUNTER SYMPTOMS
COUGH: 0
DIARRHEA: 0
SORE THROAT: 0
SHORTNESS OF BREATH: 0
VOMITING: 0
RHINORRHEA: 0
EYE REDNESS: 0
CONSTIPATION: 0
EYE DISCHARGE: 0
SINUS PAIN: 0
ABDOMINAL PAIN: 0
SINUS PRESSURE: 0
NAUSEA: 0
CHEST TIGHTNESS: 0

## 2023-03-05 ASSESSMENT — PAIN - FUNCTIONAL ASSESSMENT: PAIN_FUNCTIONAL_ASSESSMENT: 0-10

## 2023-03-05 ASSESSMENT — PAIN SCALES - GENERAL: PAINLEVEL_OUTOF10: 3

## 2023-03-05 ASSESSMENT — PAIN DESCRIPTION - ORIENTATION: ORIENTATION: RIGHT

## 2023-03-05 ASSESSMENT — PAIN DESCRIPTION - LOCATION: LOCATION: ABDOMEN

## 2023-03-05 NOTE — ED PROVIDER NOTES
The Ekg interpreted by me shows  normal sinus rhythm with a rate of 88  Axis is   Normal  QTc is  normal  Intervals and Durations are unremarkable.       ST Segments: normal  No significant change from prior EKG dated August 5, 2022          Skyler Jimenez MD  03/07/23 4478

## 2023-03-05 NOTE — ED PROVIDER NOTES
[facility]  ED  EMERGENCY DEPARTMENT ENCOUNTER        Pt Name: Christopher Harmon  MRN: [id number]  [place] 1970  Date of evaluation: 3/5/2023  Provider: Jose Busch PA-C  PCP: Canelo Ibrahim MD  Note Started: 6:43 PM EST 3/5/23      AMBER. I have evaluated this patient. My supervising physician was available for consultation. CHIEF COMPLAINT       Chief Complaint   Patient presents with    Abdominal Pain     Patient has been having lower right quadrant abdominal pain with radiation to back. Also complaining of left sided lower quadrant being numb. Nausea began three nights ago. Semi-loose stool this morning, no difficulty urinating. HISTORY OF PRESENT ILLNESS: 1 or more Elements     History from : Patient    Limitations to history : None    Christopher Harmon is a 47 y.o. male with a past medical history of hypertension hyperlipidemia tobacco abuse morbid obesity presents to the ER with a complaint of a 1 week history of right lower quadrant abdominal pain. Patient indicates it is worse with movement. Associated with nausea. And he does not feel like is improving no history of similar episodes he has had a kidney stone in the past however this feels different patient denies any dysuria or hematuria denies any vomiting or diarrhea last bowel movement was today. Nursing Notes were all reviewed and agreed with or any disagreements were addressed in the HPI. REVIEW OF SYSTEMS :      Review of Systems   Constitutional:  Negative for chills and fever. HENT: Negative. Negative for congestion, rhinorrhea, sinus pressure, sinus pain and sore throat. Eyes:  Negative for discharge, redness and visual disturbance. Respiratory:  Negative for cough, chest tightness and shortness of breath. Cardiovascular:  Negative for chest pain and palpitations. Gastrointestinal:  Negative for abdominal pain, constipation, diarrhea, nausea and vomiting.    Genitourinary:  Negative for difficulty urinating, dysuria and frequency. Musculoskeletal: Negative. Skin: Negative. Neurological: Negative. Negative for dizziness, weakness, numbness and headaches. Psychiatric/Behavioral: Negative. All other systems reviewed and are negative. Positives and Pertinent negatives as per HPI.      SURGICAL HISTORY     Past Surgical History:   Procedure Laterality Date    KIDNEY STONE SURGERY         CURRENTMEDICATIONS       Discharge Medication List as of 3/5/2023  9:14 PM        CONTINUE these medications which have NOT CHANGED    Details   ketorolac (TORADOL) 10 MG tablet Take 1 tablet by mouth every 6 hours as needed for Pain, Disp-20 tablet, R-0Normal      escitalopram (LEXAPRO) 5 MG tablet TAKE 1 TABLET BY MOUTH EVERY DAY, Disp-90 tablet, R-0Normal      nicotine (NICODERM CQ) 14 MG/24HR Place 1 patch onto the skin every 24 hours, Disp-28 patch, R-2Normal      lisinopril-hydroCHLOROthiazide (PRINZIDE;ZESTORETIC) 20-25 MG per tablet TAKE 1 TABLET BY MOUTH EVERY DAY, Disp-90 tablet, R-1Normal      atorvastatin (LIPITOR) 20 MG tablet Take 1 tablet by mouth in the morning., Disp-90 tablet, R-3Normal             ALLERGIES     Ciprofloxacin    FAMILYHISTORY       Family History   Problem Relation Age of Onset    Heart Disease Mother     Arrhythmia Mother     Diabetes Mother     Leukemia Father     Diabetes Maternal Grandmother     No Known Problems Paternal Grandmother     No Known Problems Paternal Grandfather         SOCIAL HISTORY       Social History     Tobacco Use    Smoking status: Every Day     Packs/day: 0.50     Years: 25.00     Pack years: 12.50     Types: Cigarettes    Smokeless tobacco: Never    Tobacco comments:         Vaping Use    Vaping Use: Never used   Substance Use Topics    Alcohol use: Yes     Comment: 3 drinks every other weekend    Drug use: No       SCREENINGS        [place] Coma Scale  Eye Opening: Spontaneous  Best Verbal Response: Oriented  Best Motor Response: Obeys commands  [place] Coma Scale Score: 15                WA Assessment  BP: (!) 111/54  Heart Rate: 86           PHYSICAL EXAM  1 or more Elements     ED Triage Vitals [03/05/23 1808]   BP Temp Temp Source Heart Rate Resp SpO2 Height Weight   (!) 125/90 98 °F (36.7 °C) Oral 93 14 99 % 6' (1.829 m) 270 lb (122.5 kg)       Physical Exam  Vitals and nursing note reviewed. Constitutional:       Appearance: Normal appearance. He is well-developed. He is obese. He is not diaphoretic. HENT:      Head: Normocephalic and atraumatic. Nose: Nose normal.      Mouth/Throat:      Mouth: Mucous membranes are moist.      Pharynx: No oropharyngeal exudate. Eyes:      General:         Right eye: No discharge. Left eye: No discharge. Extraocular Movements: Extraocular movements intact. Conjunctiva/sclera: Conjunctivae normal.      Pupils: Pupils are equal, round, and reactive to light. Cardiovascular:      Rate and Rhythm: Normal rate and regular rhythm. Heart sounds: Normal heart sounds. No murmur heard. No friction rub. No gallop. Pulmonary:      Effort: Pulmonary effort is normal. No respiratory distress. Breath sounds: Normal breath sounds. No wheezing. Abdominal:      General: Bowel sounds are normal. There is no distension. Palpations: Abdomen is soft. Tenderness: There is abdominal tenderness in the right lower quadrant. Musculoskeletal:         General: Normal range of motion. Cervical back: Normal range of motion. Skin:     General: Skin is warm and dry. Capillary Refill: Capillary refill takes less than 2 seconds. Neurological:      General: No focal deficit present. Mental Status: He is alert.    Psychiatric:         Mood and Affect: Mood normal.         Behavior: Behavior normal.           DIAGNOSTIC RESULTS   LABS:    Labs Reviewed   CBC WITH AUTO DIFFERENTIAL - Abnormal; Notable for the following components:       Result Value    MCH 34.2 (*) All other components within normal limits   COMPREHENSIVE METABOLIC PANEL W/ REFLEX TO MG FOR LOW K - Abnormal; Notable for the following components:    Sodium 135 (*)     Chloride 97 (*)     Glucose 101 (*)     Creatinine 0.8 (*)     All other components within normal limits   URINALYSIS WITH REFLEX TO CULTURE - Abnormal; Notable for the following components:    Blood, Urine LARGE (*)     All other components within normal limits   MICROSCOPIC URINALYSIS - Abnormal; Notable for the following components:    RBC, UA 5-10 (*)     All other components within normal limits   LIPASE   TROPONIN   BRAIN NATRIURETIC PEPTIDE   SAMPLE POSSIBLE BLOOD BANK TESTING       When ordered only abnormal lab results are displayed. All other labs were within normal range or not returned as of this dictation. EKG: When ordered, EKG's are interpreted by the Emergency Department Physician in the absence of a cardiologist.  Please see their note for interpretation of EKG. RADIOLOGY:   Non-plain film images such as CT, Ultrasound and MRI are read by the radiologist. Plain radiographic images are visualized and preliminarily interpreted by the ED Provider with the below findings:        Interpretation per the Radiologist below, if available at the time of this note:    CT ABDOMEN PELVIS WO CONTRAST   Final Result   No acute intra-abdominal or pelvic abnormality. No evidence of urinary tract   obstruction or appendicitis. PAST MEDICAL HISTORY      has a past medical history of Anxiety, Chronic back  and neck pain, Degenerative disc disease, Diabetes mellitus ([address] Diverticulosis, Fatty liver, H/O renal calculi, Hyperlipidemia, Hypertension, MDD (major depressive disorder), Obesity, Prediabetes, Pure hypercholesterolemia, Scoliosis, and Tobacco dependence.      Chronic Conditions affecting Care: above    EMERGENCY DEPARTMENT COURSE and DIFFERENTIAL DIAGNOSIS/MDM:   Vitals:    Vitals:    03/05/23 1923 03/05/23 1951 03/05/23 2037 03/05/23 2052   BP: 130/85 131/81 116/68 (!) 111/54   Pulse: 79 76 86 86   Resp: 16 17 15 16   Temp:       TempSrc:       SpO2: 100% 97% 98% 97%   Weight:       Height:           Patient was given the following medications:  Medications   ketorolac (TORADOL) injection 15 mg (15 mg IntraVENous Given 3/5/23 1847)   ondansetron (ZOFRAN) injection 4 mg (4 mg IntraVENous Given 3/5/23 1847)   famotidine (PEPCID) injection 20 mg (20 mg IntraVENous Given 3/5/23 1847)   0.9 % sodium chloride bolus (0 mLs IntraVENous Stopped 3/5/23 1946)   dicyclomine (BENTYL) capsule 10 mg (10 mg Oral Given 3/5/23 2004)       ED Course as of 03/05/23 2145   Sun Mar 05, 2023   1904 Labs are remarkable for hematuria. Otherwise unremarkable [AR]   1945 Ct imaging is negative for any acute findings. [AR]      ED Course User Index  [AR] Maxine Pal PA-C        Is this patient to be included in the SEP-1 Core Measure due to severe sepsis or septic shock? No   Exclusion criteria - the patient is NOT to be included for SEP-1 Core Measure due to:  2+ SIRS criteria are not met  CC/HPI Summary, DDx, ED Course, and Reassessment: Nontoxic patient in no acute distress SPO2 on room air of 97% the patient's not hypoxic. Patient with tenderness and guarding to the right side of his abdomen. Concerned it may be his appendix. Patient also has a history of kidney stones. CT imaging is obtained in addition to labs. Labs are grossly unremarkable with exception of hematuria which is not new for the patient. Discussed this with the patient and advised urology follow-up especially with his history of tobacco abuse CT imaging shows no acute findings. Patient was given Toradol Zofran and Pepcid while in our department which helps with his pain on reevaluation at this time he feels comfortable with being discharged in stable condition follow-up with PCP.    Results for orders placed or performed during the hospital encounter of 03/05/23   CBC with Diff   Result Value Ref Range    WBC 7.9 4.0 - 11.0 K/uL    RBC 4.71 4.20 - 5.90 M/uL    Hemoglobin 16.1 13.5 - 17.5 g/dL    Hematocrit 46.9 40.5 - 52.5 %    MCV 99.5 80.0 - 100.0 fL    MCH 34.2 (H) 26.0 - 34.0 pg    MCHC 34.4 31.0 - 36.0 g/dL    RDW 13.0 12.4 - 15.4 %    Platelets 284 135 - 450 K/uL    MPV 6.4 5.0 - 10.5 fL    Neutrophils % 64.2 %    Lymphocytes % 25.8 %    Monocytes % 7.1 %    Eosinophils % 2.1 %    Basophils % 0.8 %    Neutrophils Absolute 5.1 1.7 - 7.7 K/uL    Lymphocytes Absolute 2.1 1.0 - 5.1 K/uL    Monocytes Absolute 0.6 0.0 - 1.3 K/uL    Eosinophils Absolute 0.2 0.0 - 0.6 K/uL    Basophils Absolute 0.1 0.0 - 0.2 K/uL   CMP w/ Reflex to MG   Result Value Ref Range    Sodium 135 (L) 136 - 145 mmol/L    Potassium reflex Magnesium 4.0 3.5 - 5.1 mmol/L    Chloride 97 (L) 99 - 110 mmol/L    CO2 28 21 - 32 mmol/L    Anion Gap 10 3 - 16    Glucose 101 (H) 70 - 99 mg/dL    BUN 17 7 - 20 mg/dL    Creatinine 0.8 (L) 0.9 - 1.3 mg/dL    Est, Glom Filt Rate >60 >60    Calcium 9.4 8.3 - 10.6 mg/dL    Total Protein 6.9 6.4 - 8.2 g/dL    Albumin 4.5 3.4 - 5.0 g/dL    Albumin/Globulin Ratio 1.9 1.1 - 2.2    Total Bilirubin 0.4 0.0 - 1.0 mg/dL    Alkaline Phosphatase 110 40 - 129 U/L    ALT 26 10 - 40 U/L    AST 24 15 - 37 U/L   Lipase   Result Value Ref Range    Lipase 37.0 13.0 - 60.0 U/L   Urinalysis with Reflex to Culture    Specimen: Urine   Result Value Ref Range    Color, UA Yellow Straw/Yellow    Clarity, UA Clear Clear    Glucose, Ur Negative Negative mg/dL    Bilirubin Urine Negative Negative    Ketones, Urine Negative Negative mg/dL    Specific Gravity, UA 1.020 1.005 - 1.030    Blood, Urine LARGE (A) Negative    pH, UA 6.0 5.0 - 8.0    Protein, UA Negative Negative mg/dL    Urobilinogen, Urine 0.2 <2.0 E.U./dL    Nitrite, Urine Negative Negative    Leukocyte Esterase, Urine Negative Negative    Microscopic Examination YES     Urine Type NotGiven     Urine Reflex to Culture Not Indicated   Troponin   Result Value Ref Range    Troponin <0.01 <0.01 ng/mL   Brain Natriuretic Peptide   Result Value Ref Range    Pro-BNP 16 0 - 124 pg/mL   Sample possible blood bank testing   Result Value Ref Range    Specimen Status GURVINDER    Microscopic Urinalysis   Result Value Ref Range    WBC, UA 0-2 0 - 5 /HPF    RBC, UA 5-10 (A) 0 - 4 /HPF   EKG 12 Lead (Select if Upper Abd Pain, or SOB, Diaphoresis or Tachy)   Result Value Ref Range    Ventricular Rate 88 BPM    Atrial Rate 88 BPM    P-R Interval 164 ms    QRS Duration 90 ms    Q-T Interval 354 ms    QTc Calculation (Bazett) 428 ms    P Axis 59 degrees    R Axis 49 degrees    T Axis 46 degrees    Diagnosis       Normal sinus rhythmNormal ECGWhen compared with ECG of 05-AUG-2022 03:17,No significant change was found         I estimate there is LOW risk for ACUTE APPENDICITIS, BOWEL OBSTRUCTION, CHOLECYSTITIS, DIVERTICULITIS, INCARCERATED HERNIA, PANCREATITIS, or PERFORATED BOWEL or ULCER, thus I consider the discharge disposition reasonable. Also, there is no evidence or peritonitis, sepsis, or toxicity. Oscar Tabor and I have discussed the diagnosis and risks, and we agree with discharging home to follow-up with their primary doctor. We also discussed returning to the Emergency Department immediately if new or worsening symptoms occur. We have discussed the symptoms which are most concerning (e.g., bloody stool, fever, changing or worsening pain, vomiting) that necessitate immediate return. FINAL Impression    1. Right lower quadrant abdominal pain    2. Nausea        Blood pressure (!) 111/54, pulse 86, temperature 98 °F (36.7 °C), temperature source Oral, resp. rate 16, height 6' (1.829 m), weight 270 lb (122.5 kg), SpO2 97 %. I am the Primary Clinician of Record.         PATIENT REFERRED TO:  Franci Mendosa MD  [facility] [address] [address]  [phone]      for a recheck in 2-3  days    DISCHARGE MEDICATIONS:  Discharge Medication List as of 3/5/2023  9:14 PM        START taking these medications    Details   ondansetron (ZOFRAN) 4 MG tablet Take 1 tablet by mouth every 8 hours as needed for Nausea, Disp-20 tablet, R-0Normal      famotidine (PEPCID) 40 MG tablet Take 1 tablet by mouth daily for 10 days, Disp-10 tablet, R-0Normal      dicyclomine (BENTYL) 10 MG capsule Take 1 capsule by mouth 2 times daily as needed (abdominal pain), Disp-14 capsule, R-0Normal             DISCONTINUED MEDICATIONS:  Discharge Medication List as of 3/5/2023  9:14 PM        STOP taking these medications       diclofenac (VOLTAREN) 75 MG EC tablet Comments:   Reason for Stopping:                      (Please note that portions of this note were completed with a voice recognition program.  Efforts were made to edit the dictations but occasionally words are mis-transcribed.)    Gwen Leon PA-C (electronically signed)            Gwen Leon PA-C  03/05/23 8626

## 2023-03-06 LAB
EKG ATRIAL RATE: 88 BPM
EKG DIAGNOSIS: NORMAL
EKG P AXIS: 59 DEGREES
EKG P-R INTERVAL: 164 MS
EKG Q-T INTERVAL: 354 MS
EKG QRS DURATION: 90 MS
EKG QTC CALCULATION (BAZETT): 428 MS
EKG R AXIS: 49 DEGREES
EKG T AXIS: 46 DEGREES
EKG VENTRICULAR RATE: 88 BPM

## 2023-03-06 PROCEDURE — 93010 ELECTROCARDIOGRAM REPORT: CPT | Performed by: INTERNAL MEDICINE

## 2023-03-20 RX ORDER — PANTOPRAZOLE SODIUM 40 MG/1
TABLET, DELAYED RELEASE ORAL
Qty: 30 TABLET | Refills: 1 | OUTPATIENT
Start: 2023-03-20

## 2023-04-06 DIAGNOSIS — F41.1 GENERALIZED ANXIETY DISORDER: ICD-10-CM

## 2023-04-06 RX ORDER — ESCITALOPRAM OXALATE 5 MG/1
TABLET ORAL
Qty: 90 TABLET | Refills: 0 | Status: SHIPPED | OUTPATIENT
Start: 2023-04-06

## 2023-04-06 NOTE — TELEPHONE ENCOUNTER
Refill Request       Last Seen: Last Seen Department: 1/9/2023  Last Seen by PCP: 1/9/2023    Last Written: 1/9/23    Next Appointment:   Future Appointments   Date Time Provider Regina Wilcox   7/18/2023 11:00 AM Sarah Velasquez MD [facility] [address] [facility]       Future appointment scheduled      Requested Prescriptions     Pending Prescriptions Disp Refills    escitalopram (LEXAPRO) 5 MG tablet [Pharmacy Med Name: ESCITALOPRAM 5 MG TABLET] 90 tablet 0     Sig: TAKE 1 TABLET BY MOUTH EVERY DAY

## 2023-05-29 ENCOUNTER — APPOINTMENT (OUTPATIENT)
Dept: GENERAL RADIOLOGY | Age: 53
End: 2023-05-29
Payer: COMMERCIAL

## 2023-05-29 ENCOUNTER — HOSPITAL ENCOUNTER (EMERGENCY)
Age: 53
Discharge: HOME | End: 2023-05-30
Attending: EMERGENCY MEDICINE
Payer: COMMERCIAL

## 2023-05-29 DIAGNOSIS — R10.13 ABDOMINAL PAIN, EPIGASTRIC: Primary | ICD-10-CM

## 2023-05-29 DIAGNOSIS — R07.9 CHEST PAIN, UNSPECIFIED TYPE: ICD-10-CM

## 2023-05-29 LAB
ALBUMIN SERPL-MCNC: 4.6 G/DL (ref 3.4–5)
ALBUMIN/GLOB SERPL: 1.6 {RATIO} (ref 1.1–2.2)
ALP SERPL-CCNC: 96 U/L (ref 40–129)
ALT SERPL-CCNC: 28 U/L (ref 10–40)
ANION GAP SERPL CALCULATED.3IONS-SCNC: 14 MMOL/L (ref 3–16)
AST SERPL-CCNC: 18 U/L (ref 15–37)
BASOPHILS # BLD: 0.1 K/UL (ref 0–0.2)
BASOPHILS NFR BLD: 0.7 %
BILIRUB SERPL-MCNC: 0.6 MG/DL (ref 0–1)
BUN SERPL-MCNC: 16 MG/DL (ref 7–20)
CALCIUM SERPL-MCNC: 10.2 MG/DL (ref 8.3–10.6)
CHLORIDE SERPL-SCNC: 96 MMOL/L (ref 99–110)
CO2 SERPL-SCNC: 25 MMOL/L (ref 21–32)
CREAT SERPL-MCNC: 0.9 MG/DL (ref 0.9–1.3)
D DIMER: 0.37 UG/ML FEU (ref 0–0.6)
DEPRECATED RDW RBC AUTO: 13.4 % (ref 12.4–15.4)
EOSINOPHIL # BLD: 0.2 K/UL (ref 0–0.6)
EOSINOPHIL NFR BLD: 1.2 %
GFR SERPLBLD CREATININE-BSD FMLA CKD-EPI: >60 ML/MIN/{1.73_M2}
GLUCOSE SERPL-MCNC: 154 MG/DL (ref 70–99)
HCT VFR BLD AUTO: 47.2 % (ref 40.5–52.5)
HGB BLD-MCNC: 16.2 G/DL (ref 13.5–17.5)
LYMPHOCYTES # BLD: 2.8 K/UL (ref 1–5.1)
LYMPHOCYTES NFR BLD: 22.9 %
MCH RBC QN AUTO: 34.2 PG (ref 26–34)
MCHC RBC AUTO-ENTMCNC: 34.2 G/DL (ref 31–36)
MCV RBC AUTO: 99.9 FL (ref 80–100)
MONOCYTES # BLD: 0.5 K/UL (ref 0–1.3)
MONOCYTES NFR BLD: 3.9 %
NEUTROPHILS # BLD: 8.8 K/UL (ref 1.7–7.7)
NEUTROPHILS NFR BLD: 71.3 %
PLATELET # BLD AUTO: 318 K/UL (ref 135–450)
PMV BLD AUTO: 6.8 FL (ref 5–10.5)
POTASSIUM SERPL-SCNC: 3.9 MMOL/L (ref 3.5–5.1)
PROT SERPL-MCNC: 7.4 G/DL (ref 6.4–8.2)
RBC # BLD AUTO: 4.72 M/UL (ref 4.2–5.9)
SODIUM SERPL-SCNC: 135 MMOL/L (ref 136–145)
TROPONIN, HIGH SENSITIVITY: 10 NG/L (ref 0–22)
TROPONIN, HIGH SENSITIVITY: 12 NG/L (ref 0–22)
WBC # BLD AUTO: 12.3 K/UL (ref 4–11)

## 2023-05-29 PROCEDURE — 85025 COMPLETE CBC W/AUTO DIFF WBC: CPT

## 2023-05-29 PROCEDURE — 2580000003 HC RX 258: Performed by: PHYSICIAN ASSISTANT

## 2023-05-29 PROCEDURE — 85379 FIBRIN DEGRADATION QUANT: CPT

## 2023-05-29 PROCEDURE — 99285 EMERGENCY DEPT VISIT HI MDM: CPT

## 2023-05-29 PROCEDURE — 80053 COMPREHEN METABOLIC PANEL: CPT

## 2023-05-29 PROCEDURE — 84484 ASSAY OF TROPONIN QUANT: CPT

## 2023-05-29 PROCEDURE — 71046 X-RAY EXAM CHEST 2 VIEWS: CPT

## 2023-05-29 RX ORDER — 0.9 % SODIUM CHLORIDE 0.9 %
1000 INTRAVENOUS SOLUTION INTRAVENOUS ONCE
Status: COMPLETED | OUTPATIENT
Start: 2023-05-29 | End: 2023-05-30

## 2023-05-29 RX ADMIN — SODIUM CHLORIDE 1000 ML: 9 INJECTION, SOLUTION INTRAVENOUS at 22:06

## 2023-05-29 ASSESSMENT — PAIN - FUNCTIONAL ASSESSMENT: PAIN_FUNCTIONAL_ASSESSMENT: NONE - DENIES PAIN

## 2023-05-30 ENCOUNTER — APPOINTMENT (OUTPATIENT)
Dept: CT IMAGING | Age: 53
End: 2023-05-30
Payer: COMMERCIAL

## 2023-05-30 VITALS
HEART RATE: 86 BPM | TEMPERATURE: 98.2 F | SYSTOLIC BLOOD PRESSURE: 166 MMHG | DIASTOLIC BLOOD PRESSURE: 112 MMHG | OXYGEN SATURATION: 93 % | RESPIRATION RATE: 19 BRPM

## 2023-05-30 PROCEDURE — 6370000000 HC RX 637 (ALT 250 FOR IP): Performed by: EMERGENCY MEDICINE

## 2023-05-30 PROCEDURE — 6360000004 HC RX CONTRAST MEDICATION: Performed by: PHYSICIAN ASSISTANT

## 2023-05-30 PROCEDURE — 74177 CT ABD & PELVIS W/CONTRAST: CPT

## 2023-05-30 RX ADMIN — ALUMINUM HYDROXIDE, MAGNESIUM HYDROXIDE, AND SIMETHICONE: 200; 200; 20 SUSPENSION ORAL at 00:31

## 2023-05-30 RX ADMIN — IOPAMIDOL 75 ML: 755 INJECTION, SOLUTION INTRAVENOUS at 00:12

## 2023-05-30 NOTE — ED PROVIDER NOTES
[address] [facility]  ED  EMERGENCY DEPARTMENT ENCOUNTER        Pt Name: Sofia David  MRN: [id number]  [place] 1970  Date of evaluation: 5/29/2023  Provider: ROSA Landers  PCP: Sushma Orellana MD  Note Started: 1:56 AM EDT 5/30/23       I have seen and evaluated this patient with my supervising physician No att. providers found. CHIEF COMPLAINT       Chief Complaint   Patient presents with    Chest Pain     States began this morning while at rest. States sharp pains on and off on left side of chest, \"feels like an electric shock. \" States this evening after bowel movement began having RLQ abdominal pain     Abdominal Pain       HISTORY OF PRESENT ILLNESS: 1 or more Elements     History from : Patient    Limitations to history : None    Sofia David is a 47 y.o. male who presents to the emergency department today for chest pain and abdominal pain. Patient states pain began this morning when he woke up. He describes it as sharp stabbing sensation. He states it feels like \"an electric shock. \".  Pain is on the left side of his chest.  It is intermittent. He also is complaining of abdominal pain after having a bowel movement this evening. He states he has had this chest pain previously however today he became diaphoretic with the chest pain therefore he came to the emergency department for evaluation. Denies any fevers, chills, dysuria, hematuria. Nursing Notes were all reviewed and agreed with or any disagreements were addressed in the HPI. REVIEW OF SYSTEMS :      Review of Systems    Positives and Pertinent negatives as per HPI.      SURGICAL HISTORY     Past Surgical History:   Procedure Laterality Date    KIDNEY STONE SURGERY         CURRENTMEDICATIONS       Discharge Medication List as of 5/30/2023  1:27 AM        CONTINUE these medications which have NOT CHANGED    Details   escitalopram (LEXAPRO) 5 MG tablet TAKE 1 TABLET BY MOUTH EVERY DAY, Disp-90 tablet,

## 2023-05-30 NOTE — ED PROVIDER NOTES
Emergency Department Provider Note     Location: [facility]  ED  5/29/2023    I independently performed a history and physical on Liberty Maravilla. All diagnostic, treatment, and disposition decisions were made by myself in conjunction with the mid-level provider. Liberty Maravilla is a 47 y.o. male here for abdominal pain. Patient reports earlier today he has sharp pain in the left side of the chest and epigastric area. Then this evening, after having a bowel movement, pain migrated to right lower quadrant. Denies fever. Denies urinary symptoms. No other complaints, modifying factors or associated symptoms. ED Triage Vitals [05/29/23 2130]   BP Temp Temp Source Pulse Respirations SpO2 Height Weight   133/87 98.2 °F (36.8 °C) Oral (!) 111 18 99 % -- --        Exam showed WDWN male awake, alert, no facial droop, no slurred speech, heart RRR, lungs clear, abdomen soft, nontender. No distention. ED course/MDM  Patient seen and examined in room 9    EKG  The Ekg interpreted by me in the absence of a cardiologist shows. sinus tachycardia, mlry=388  Axis is   Normal  QTc is  normal  Intervals and Durations are unremarkable. No specific ST-T wave changes appreciated. No evidence of acute ischemia. Compared to prior EKG dated March 5, 2023, patient is tachycardic today but otherwise no significant changes. Radiology  XR CHEST (2 VW)    Result Date: 5/29/2023  EXAMINATION: TWO XRAY VIEWS OF THE CHEST 5/29/2023 9:59 pm COMPARISON: 08/05/2022 HISTORY: ORDERING SYSTEM PROVIDED HISTORY: cp TECHNOLOGIST PROVIDED HISTORY: Reason for exam:->cp Reason for Exam: abd and chest pains since this morning. said he has acid reflux, but this feels different to him FINDINGS: The cardiomediastinal silhouette is within normal limits. There is no consolidation, pneumothorax or evidence for edema. No evidence for effusion. No acute osseous abnormality is identified.      No acute airspace disease

## 2023-07-09 DIAGNOSIS — F41.1 GENERALIZED ANXIETY DISORDER: ICD-10-CM

## 2023-07-10 RX ORDER — ESCITALOPRAM OXALATE 5 MG/1
TABLET ORAL
Qty: 90 TABLET | Refills: 0 | Status: SHIPPED | OUTPATIENT
Start: 2023-07-10

## 2023-07-10 NOTE — TELEPHONE ENCOUNTER
Refill Request       Last Seen: Last Seen Department: 1/9/2023  Last Seen by PCP: 1/9/2023    Last Written: 4/6/2023   #90 with 0    Next Appointment:   Future Appointments   Date Time Provider [address]   7/18/2023 11:00 AM Tiffany Elon Cowden, [facility]             Requested Prescriptions     Pending Prescriptions Disp Refills    escitalopram (LEXAPRO) 5 MG tablet [Pharmacy Med Name: ESCITALOPRAM 5 MG TABLET] 90 tablet 0     Sig: TAKE 1 TABLET BY MOUTH EVERY DAY

## 2023-08-29 ENCOUNTER — OFFICE VISIT (OUTPATIENT)
Dept: PRIMARY CARE CLINIC | Age: 53
End: 2023-08-29
Payer: COMMERCIAL

## 2023-08-29 VITALS
HEART RATE: 90 BPM | BODY MASS INDEX: 39.22 KG/M2 | TEMPERATURE: 97.3 F | OXYGEN SATURATION: 94 % | SYSTOLIC BLOOD PRESSURE: 130 MMHG | DIASTOLIC BLOOD PRESSURE: 80 MMHG | WEIGHT: 289.2 LBS

## 2023-08-29 DIAGNOSIS — R31.9 HEMATURIA, UNSPECIFIED TYPE: Primary | ICD-10-CM

## 2023-08-29 DIAGNOSIS — F17.200 TOBACCO DEPENDENCE: ICD-10-CM

## 2023-08-29 DIAGNOSIS — R73.03 PREDIABETES: ICD-10-CM

## 2023-08-29 DIAGNOSIS — I10 HYPERTENSION, UNSPECIFIED TYPE: ICD-10-CM

## 2023-08-29 DIAGNOSIS — R29.818 SUSPECTED SLEEP APNEA: ICD-10-CM

## 2023-08-29 DIAGNOSIS — E78.00 PURE HYPERCHOLESTEROLEMIA: ICD-10-CM

## 2023-08-29 DIAGNOSIS — F41.9 ANXIETY: ICD-10-CM

## 2023-08-29 PROCEDURE — 3079F DIAST BP 80-89 MM HG: CPT

## 2023-08-29 PROCEDURE — G8417 CALC BMI ABV UP PARAM F/U: HCPCS

## 2023-08-29 PROCEDURE — 3075F SYST BP GE 130 - 139MM HG: CPT

## 2023-08-29 PROCEDURE — 99214 OFFICE O/P EST MOD 30 MIN: CPT

## 2023-08-29 PROCEDURE — 3017F COLORECTAL CA SCREEN DOC REV: CPT

## 2023-08-29 PROCEDURE — G8427 DOCREV CUR MEDS BY ELIG CLIN: HCPCS

## 2023-08-29 PROCEDURE — 4004F PT TOBACCO SCREEN RCVD TLK: CPT

## 2023-08-29 RX ORDER — ATORVASTATIN CALCIUM 20 MG/1
20 TABLET, FILM COATED ORAL DAILY
Qty: 90 TABLET | Refills: 3 | Status: SHIPPED | OUTPATIENT
Start: 2023-08-29

## 2023-08-29 RX ORDER — NICOTINE 21 MG/24HR
1 PATCH, TRANSDERMAL 24 HOURS TRANSDERMAL EVERY 24 HOURS
Qty: 30 PATCH | Refills: 2 | Status: SHIPPED | OUTPATIENT
Start: 2023-08-29 | End: 2023-11-27

## 2023-08-29 RX ORDER — ALBUTEROL SULFATE 90 UG/1
AEROSOL, METERED RESPIRATORY (INHALATION)
COMMUNITY
Start: 2023-05-22

## 2023-08-29 SDOH — ECONOMIC STABILITY: INCOME INSECURITY: HOW HARD IS IT FOR YOU TO PAY FOR THE VERY BASICS LIKE FOOD, HOUSING, MEDICAL CARE, AND HEATING?: SOMEWHAT HARD

## 2023-08-29 SDOH — ECONOMIC STABILITY: FOOD INSECURITY: WITHIN THE PAST 12 MONTHS, YOU WORRIED THAT YOUR FOOD WOULD RUN OUT BEFORE YOU GOT MONEY TO BUY MORE.: NEVER TRUE

## 2023-08-29 SDOH — ECONOMIC STABILITY: HOUSING INSECURITY
IN THE LAST 12 MONTHS, WAS THERE A TIME WHEN YOU DID NOT HAVE A STEADY PLACE TO SLEEP OR SLEPT IN A SHELTER (INCLUDING NOW)?: NO

## 2023-08-29 SDOH — ECONOMIC STABILITY: FOOD INSECURITY: WITHIN THE PAST 12 MONTHS, THE FOOD YOU BOUGHT JUST DIDN'T LAST AND YOU DIDN'T HAVE MONEY TO GET MORE.: NEVER TRUE

## 2023-08-29 ASSESSMENT — ENCOUNTER SYMPTOMS
WHEEZING: 0
SHORTNESS OF BREATH: 0
SINUS PRESSURE: 0
RHINORRHEA: 0
ABDOMINAL PAIN: 0
SORE THROAT: 0
DIARRHEA: 0
BACK PAIN: 0
VOMITING: 0
NAUSEA: 0
COUGH: 0

## 2023-08-29 ASSESSMENT — PATIENT HEALTH QUESTIONNAIRE - PHQ9
10. IF YOU CHECKED OFF ANY PROBLEMS, HOW DIFFICULT HAVE THESE PROBLEMS MADE IT FOR YOU TO DO YOUR WORK, TAKE CARE OF THINGS AT HOME, OR GET ALONG WITH OTHER PEOPLE: 0
6. FEELING BAD ABOUT YOURSELF - OR THAT YOU ARE A FAILURE OR HAVE LET YOURSELF OR YOUR FAMILY DOWN: 0
8. MOVING OR SPEAKING SO SLOWLY THAT OTHER PEOPLE COULD HAVE NOTICED. OR THE OPPOSITE, BEING SO FIGETY OR RESTLESS THAT YOU HAVE BEEN MOVING AROUND A LOT MORE THAN USUAL: 0
3. TROUBLE FALLING OR STAYING ASLEEP: 0
9. THOUGHTS THAT YOU WOULD BE BETTER OFF DEAD, OR OF HURTING YOURSELF: 0
SUM OF ALL RESPONSES TO PHQ QUESTIONS 1-9: 3
5. POOR APPETITE OR OVEREATING: 2
SUM OF ALL RESPONSES TO PHQ QUESTIONS 1-9: 3
7. TROUBLE CONCENTRATING ON THINGS, SUCH AS READING THE NEWSPAPER OR WATCHING TELEVISION: 0
1. LITTLE INTEREST OR PLEASURE IN DOING THINGS: 0
4. FEELING TIRED OR HAVING LITTLE ENERGY: 1
SUM OF ALL RESPONSES TO PHQ9 QUESTIONS 1 & 2: 0
SUM OF ALL RESPONSES TO PHQ QUESTIONS 1-9: 3
2. FEELING DOWN, DEPRESSED OR HOPELESS: 0
SUM OF ALL RESPONSES TO PHQ QUESTIONS 1-9: 3

## 2023-08-29 ASSESSMENT — ANXIETY QUESTIONNAIRES
4. TROUBLE RELAXING: 1
3. WORRYING TOO MUCH ABOUT DIFFERENT THINGS: 2
GAD7 TOTAL SCORE: 6
5. BEING SO RESTLESS THAT IT IS HARD TO SIT STILL: 0
2. NOT BEING ABLE TO STOP OR CONTROL WORRYING: 2
1. FEELING NERVOUS, ANXIOUS, OR ON EDGE: 1
6. BECOMING EASILY ANNOYED OR IRRITABLE: 0
IF YOU CHECKED OFF ANY PROBLEMS ON THIS QUESTIONNAIRE, HOW DIFFICULT HAVE THESE PROBLEMS MADE IT FOR YOU TO DO YOUR WORK, TAKE CARE OF THINGS AT HOME, OR GET ALONG WITH OTHER PEOPLE: NOT DIFFICULT AT ALL
7. FEELING AFRAID AS IF SOMETHING AWFUL MIGHT HAPPEN: 0

## 2023-08-29 NOTE — PROGRESS NOTES
There is no abdominal tenderness. There is no guarding or rebound. Musculoskeletal:      Right lower leg: No edema. Left lower leg: No edema. Skin:     General: Skin is warm and dry. Neurological:      General: No focal deficit present. Mental Status: He is alert and oriented to person, place, and time. Psychiatric:         Attention and Perception: Attention and perception normal.         Mood and Affect: Mood is anxious and depressed. Speech: Speech normal.         Behavior: Behavior normal. Behavior is cooperative. Thought Content: Thought content normal. Thought content does not include suicidal ideation. Thought content does not include homicidal or suicidal plan. Judgment: Judgment normal.         Assessment / Plan:     Denise Simpson is a 47 y.o. male w/ a Hx of HTN, HLD, prediabetes, possible RUPINDER, anxiety, tobacco dependence, DDD, hematuria, BPH who presents for chronic care. 1. Hematuria, unspecified type  - Etiology unknown  - Bladder cancer cannot be ruled out. High risk with age, smoking hx  - Referral placed for Urology  -     Chely Kee MD, Urology, [facility]    2. Hypertension, unspecified type  - Controlled  - Continue Lisinopril-HCTZ 20-25 mg daily  - Encouraged complete smoking cessation, increasing exercise regimen, and decreasing weight. 3. Pure hypercholesterolemia  - Controlled  - Continue lipitor 20 mg daily  - Repeat lipids  -     Lipid Panel; Future    4. Prediabetes  - Last a1c 5.4 on 1/9/2023  - Repeat a1c  -     Hemoglobin A1C; Future    5. Suspected sleep apnea  - Symptomatic with apneic spells and daytime somnolence  - Referral placed for sleep medicine  -     Ambulatory referral to Sleep Medicine    6. Anxiety  - Not at goal  - Today 8/29/2023 PHQ-9 score 3, PERLA-7 score 6  - Discussed restarting lexapro vs switching Rx d/t possible side effect of fatigue.  Discussed importance of daily adherence to medication as some mood

## 2023-10-11 DIAGNOSIS — F41.1 GENERALIZED ANXIETY DISORDER: ICD-10-CM

## 2023-10-11 NOTE — TELEPHONE ENCOUNTER
Refill Request     CONFIRM preferred pharmacy with the patient. If Mail Order Rx - Pend for 90 day refill. Last Seen: Last Seen Department: 8/29/2023  Last Seen by PCP: 1/9/2023    Last Written: 7/10/23    If no future appointment scheduled:  Review the last OV with PCP and review information for follow-up visit,  Route STAFF MESSAGE with patient name to the [facility] for scheduling with the following information:            -  Timing of next visit           -  Visit type ie Physical, OV, etc           -  Diagnoses/Reason ie. COPD, HTN - Do not use MEDICATION, Follow-up or CHECK UP - Give reason for visit      Next Appointment:   No future appointments. Message sent to [facility] to schedule appt with patient?   YES      Requested Prescriptions     Pending Prescriptions Disp Refills    escitalopram (LEXAPRO) 5 MG tablet [Pharmacy Med Name: ESCITALOPRAM 5 MG TABLET] 90 tablet 0     Sig: TAKE 1 TABLET BY MOUTH EVERY DAY

## 2023-10-12 RX ORDER — ESCITALOPRAM OXALATE 5 MG/1
TABLET ORAL
Qty: 90 TABLET | Refills: 0 | Status: SHIPPED | OUTPATIENT
Start: 2023-10-12

## 2024-01-01 DIAGNOSIS — F41.1 GENERALIZED ANXIETY DISORDER: ICD-10-CM

## 2024-01-02 RX ORDER — ESCITALOPRAM OXALATE 5 MG/1
TABLET ORAL
Qty: 90 TABLET | Refills: 0 | Status: SHIPPED | OUTPATIENT
Start: 2024-01-02

## 2024-01-02 NOTE — TELEPHONE ENCOUNTER
Call placed to all three numbers on file, unable to leave a message on the first two numbers listed as one is disconnected and the other has a full mailbox. LVM on mobile number and  requested a return call to be scheduled.

## 2024-01-02 NOTE — TELEPHONE ENCOUNTER
Schedule follow up with me in February    Please document call and then close encounter.  thanks

## 2024-01-02 NOTE — TELEPHONE ENCOUNTER
Refill Request     Return in about 3 months (around 11/29/2023) for Chronic Care.     Last Seen: Last Seen Department: 8/29/2023  Last Seen by PCP: 1/9/2023    Last Written: 10/12/23 qty 90 w/ 0     Next Appointment:   No future appointments.    Message to  to schedule appointment.         Requested Prescriptions     Pending Prescriptions Disp Refills    escitalopram (LEXAPRO) 5 MG tablet [Pharmacy Med Name: ESCITALOPRAM 5 MG TABLET] 90 tablet 0     Sig: TAKE 1 TABLET BY MOUTH EVERY DAY

## 2024-01-05 ENCOUNTER — COMMUNITY OUTREACH (OUTPATIENT)
Dept: PRIMARY CARE CLINIC | Age: 54
End: 2024-01-05

## 2024-01-09 ENCOUNTER — APPOINTMENT (OUTPATIENT)
Dept: GENERAL RADIOLOGY | Age: 54
End: 2024-01-09
Payer: COMMERCIAL

## 2024-01-09 ENCOUNTER — APPOINTMENT (OUTPATIENT)
Dept: CT IMAGING | Age: 54
End: 2024-01-09
Payer: COMMERCIAL

## 2024-01-09 ENCOUNTER — HOSPITAL ENCOUNTER (EMERGENCY)
Age: 54
Discharge: HOME | End: 2024-01-09
Attending: EMERGENCY MEDICINE
Payer: COMMERCIAL

## 2024-01-09 VITALS
TEMPERATURE: 98 F | SYSTOLIC BLOOD PRESSURE: 147 MMHG | DIASTOLIC BLOOD PRESSURE: 82 MMHG | HEART RATE: 85 BPM | HEIGHT: 72 IN | RESPIRATION RATE: 15 BRPM | OXYGEN SATURATION: 97 % | WEIGHT: 289 LBS | BODY MASS INDEX: 39.14 KG/M2

## 2024-01-09 DIAGNOSIS — R07.89 ATYPICAL CHEST PAIN: Primary | ICD-10-CM

## 2024-01-09 DIAGNOSIS — R03.0 ELEVATED BLOOD PRESSURE READING: ICD-10-CM

## 2024-01-09 DIAGNOSIS — R10.9 ABDOMINAL PAIN, UNSPECIFIED ABDOMINAL LOCATION: ICD-10-CM

## 2024-01-09 LAB
ALBUMIN SERPL-MCNC: 4.5 G/DL (ref 3.4–5)
ALBUMIN/GLOB SERPL: 1.7 {RATIO} (ref 1.1–2.2)
ALP SERPL-CCNC: 111 U/L (ref 40–129)
ALT SERPL-CCNC: 28 U/L (ref 10–40)
ANION GAP SERPL CALCULATED.3IONS-SCNC: 11 MMOL/L (ref 3–16)
AST SERPL-CCNC: 41 U/L (ref 15–37)
BASOPHILS # BLD: 0 K/UL (ref 0–0.2)
BASOPHILS NFR BLD: 0.5 %
BILIRUB SERPL-MCNC: 0.4 MG/DL (ref 0–1)
BILIRUB UR QL STRIP.AUTO: NEGATIVE
BUN SERPL-MCNC: 17 MG/DL (ref 7–20)
CALCIUM SERPL-MCNC: 9.2 MG/DL (ref 8.3–10.6)
CHLORIDE SERPL-SCNC: 99 MMOL/L (ref 99–110)
CLARITY UR: CLEAR
CO2 SERPL-SCNC: 25 MMOL/L (ref 21–32)
COLOR UR: YELLOW
CREAT SERPL-MCNC: 0.8 MG/DL (ref 0.9–1.3)
D DIMER: <0.27 UG/ML FEU (ref 0–0.6)
DEPRECATED RDW RBC AUTO: 13.4 % (ref 12.4–15.4)
EOSINOPHIL # BLD: 0.1 K/UL (ref 0–0.6)
EOSINOPHIL NFR BLD: 1.4 %
GFR SERPLBLD CREATININE-BSD FMLA CKD-EPI: >60 ML/MIN/{1.73_M2}
GLUCOSE SERPL-MCNC: 134 MG/DL (ref 70–99)
GLUCOSE UR STRIP.AUTO-MCNC: NEGATIVE MG/DL
HCT VFR BLD AUTO: 48.1 % (ref 40.5–52.5)
HGB BLD-MCNC: 16.4 G/DL (ref 13.5–17.5)
HGB UR QL STRIP.AUTO: ABNORMAL
KETONES UR STRIP.AUTO-MCNC: NEGATIVE MG/DL
LEUKOCYTE ESTERASE UR QL STRIP.AUTO: NEGATIVE
LIPASE SERPL-CCNC: 31 U/L (ref 13–60)
LYMPHOCYTES # BLD: 1.9 K/UL (ref 1–5.1)
LYMPHOCYTES NFR BLD: 22.9 %
MCH RBC QN AUTO: 34 PG (ref 26–34)
MCHC RBC AUTO-ENTMCNC: 34.2 G/DL (ref 31–36)
MCV RBC AUTO: 99.5 FL (ref 80–100)
MONOCYTES # BLD: 0.3 K/UL (ref 0–1.3)
MONOCYTES NFR BLD: 4.1 %
MUCOUS THREADS #/AREA URNS LPF: ABNORMAL /LPF
NEUTROPHILS # BLD: 5.8 K/UL (ref 1.7–7.7)
NEUTROPHILS NFR BLD: 71.1 %
NITRITE UR QL STRIP.AUTO: NEGATIVE
PH UR STRIP.AUTO: 6.5 [PH] (ref 5–8)
PLATELET # BLD AUTO: 286 K/UL (ref 135–450)
PMV BLD AUTO: 6.9 FL (ref 5–10.5)
POTASSIUM SERPL-SCNC: 5 MMOL/L (ref 3.5–5.1)
PROT SERPL-MCNC: 7.2 G/DL (ref 6.4–8.2)
PROT UR STRIP.AUTO-MCNC: NEGATIVE MG/DL
RBC # BLD AUTO: 4.84 M/UL (ref 4.2–5.9)
RBC #/AREA URNS HPF: ABNORMAL /HPF (ref 0–4)
SODIUM SERPL-SCNC: 135 MMOL/L (ref 136–145)
SP GR UR STRIP.AUTO: 1.02 (ref 1–1.03)
TROPONIN, HIGH SENSITIVITY: 10 NG/L (ref 0–22)
UA COMPLETE W REFLEX CULTURE PNL UR: ABNORMAL
UA DIPSTICK W REFLEX MICRO PNL UR: YES
URN SPEC COLLECT METH UR: ABNORMAL
UROBILINOGEN UR STRIP-ACNC: 0.2 E.U./DL
WBC # BLD AUTO: 8.2 K/UL (ref 4–11)
WBC #/AREA URNS HPF: ABNORMAL /HPF (ref 0–5)
YEAST URNS QL MICRO: PRESENT /HPF

## 2024-01-09 PROCEDURE — 2580000003 HC RX 258: Performed by: EMERGENCY MEDICINE

## 2024-01-09 PROCEDURE — 99285 EMERGENCY DEPT VISIT HI MDM: CPT

## 2024-01-09 PROCEDURE — 80053 COMPREHEN METABOLIC PANEL: CPT

## 2024-01-09 PROCEDURE — 83690 ASSAY OF LIPASE: CPT

## 2024-01-09 PROCEDURE — 85379 FIBRIN DEGRADATION QUANT: CPT

## 2024-01-09 PROCEDURE — 85025 COMPLETE CBC W/AUTO DIFF WBC: CPT

## 2024-01-09 PROCEDURE — 74177 CT ABD & PELVIS W/CONTRAST: CPT

## 2024-01-09 PROCEDURE — 81001 URINALYSIS AUTO W/SCOPE: CPT

## 2024-01-09 PROCEDURE — 6360000004 HC RX CONTRAST MEDICATION: Performed by: EMERGENCY MEDICINE

## 2024-01-09 PROCEDURE — 96361 HYDRATE IV INFUSION ADD-ON: CPT

## 2024-01-09 PROCEDURE — 96360 HYDRATION IV INFUSION INIT: CPT

## 2024-01-09 PROCEDURE — 71046 X-RAY EXAM CHEST 2 VIEWS: CPT

## 2024-01-09 PROCEDURE — 84484 ASSAY OF TROPONIN QUANT: CPT

## 2024-01-09 RX ORDER — ACETAMINOPHEN 500 MG
500 TABLET ORAL EVERY 6 HOURS PRN
Qty: 30 TABLET | Refills: 0 | Status: SHIPPED | OUTPATIENT
Start: 2024-01-09

## 2024-01-09 RX ORDER — 0.9 % SODIUM CHLORIDE 0.9 %
1000 INTRAVENOUS SOLUTION INTRAVENOUS ONCE
Status: COMPLETED | OUTPATIENT
Start: 2024-01-09 | End: 2024-01-09

## 2024-01-09 RX ADMIN — SODIUM CHLORIDE 1000 ML: 9 INJECTION, SOLUTION INTRAVENOUS at 17:49

## 2024-01-09 RX ADMIN — IOPAMIDOL 75 ML: 755 INJECTION, SOLUTION INTRAVENOUS at 19:09

## 2024-01-09 ASSESSMENT — PAIN SCALES - GENERAL: PAINLEVEL_OUTOF10: 6

## 2024-01-09 ASSESSMENT — ENCOUNTER SYMPTOMS
COUGH: 0
SORE THROAT: 0
SHORTNESS OF BREATH: 0
ABDOMINAL PAIN: 1
EYE REDNESS: 0
RHINORRHEA: 0

## 2024-01-09 ASSESSMENT — PAIN DESCRIPTION - ORIENTATION: ORIENTATION: RIGHT

## 2024-01-09 ASSESSMENT — LIFESTYLE VARIABLES
HOW OFTEN DO YOU HAVE A DRINK CONTAINING ALCOHOL: NEVER
HOW MANY STANDARD DRINKS CONTAINING ALCOHOL DO YOU HAVE ON A TYPICAL DAY: PATIENT DOES NOT DRINK

## 2024-01-09 ASSESSMENT — PAIN - FUNCTIONAL ASSESSMENT: PAIN_FUNCTIONAL_ASSESSMENT: 0-10

## 2024-01-09 ASSESSMENT — PAIN DESCRIPTION - LOCATION: LOCATION: ABDOMEN

## 2024-01-09 ASSESSMENT — PAIN DESCRIPTION - DESCRIPTORS: DESCRIPTORS: ACHING

## 2024-01-09 NOTE — ED PROVIDER NOTES
[facility]  ED     EMERGENCY DEPARTMENT ENCOUNTER            Pt Name: Hoang Gomez   MRN: [id number]   Birthdate 1970   Date of evaluation: 1/9/2024   Provider: Titi Louis MD   PCP: Sarah Wilde MD   Note Started: 5:22 PM EST 1/9/24          CHIEF COMPLAINT     Chief Complaint   Patient presents with    Abdominal Pain     Right sided abdominal pain from fall. Tripped and fell on arm of couch, now having right sided abdominal pain,              HISTORY OF PRESENT ILLNESS:   History from : Patient   Limitations to history : None     Hoang Gomez is a 53 y.o. male who presents with 2 complaints.  The first complaint is chest pain the second complaint is abdominal pain.    In regards to the patient's chest pain, he states 5 days ago he had chest discomfort that started.  It was in the middle of his chest.  It felt like an ache.  It lasted for 1 hour.  It resolved.  It has not reoccurred.  He has no prior history of PE or DVT.  No cough, shortness of breath, hemoptysis, prior history of PE or DVT    In regards to the patient's abdominal pain he states 3 days ago he was walking when he lost his footing causing himself to trip.  He states he came down on the left seat and hit his lower right flank and abdomen.  He states he has continued pain in his right flank and right lower quadrant.  No rib pain.  He does endorse hematuria but states he has had hematuria for 5+  years without any changes.    Nursing Notes were all reviewed and agreed with, or any disagreements were addressed in the HPI.     REVIEW OF SYSTEMS :    Review of Systems   Constitutional:  Negative for chills and fever.   HENT:  Negative for rhinorrhea and sore throat.    Eyes:  Negative for redness.   Respiratory:  Negative for cough and shortness of breath.    Cardiovascular:  Positive for chest pain.   Gastrointestinal:  Positive for abdominal pain.   Genitourinary:  Positive for hematuria.

## 2024-02-25 ENCOUNTER — APPOINTMENT (OUTPATIENT)
Dept: GENERAL RADIOLOGY | Age: 54
End: 2024-02-25
Payer: COMMERCIAL

## 2024-02-25 ENCOUNTER — HOSPITAL ENCOUNTER (EMERGENCY)
Age: 54
Discharge: HOME | End: 2024-02-25
Payer: COMMERCIAL

## 2024-02-25 VITALS
RESPIRATION RATE: 16 BRPM | HEIGHT: 72 IN | TEMPERATURE: 98.3 F | OXYGEN SATURATION: 96 % | BODY MASS INDEX: 38.6 KG/M2 | HEART RATE: 97 BPM | WEIGHT: 285 LBS | DIASTOLIC BLOOD PRESSURE: 85 MMHG | SYSTOLIC BLOOD PRESSURE: 132 MMHG

## 2024-02-25 DIAGNOSIS — I10 ESSENTIAL HYPERTENSION: ICD-10-CM

## 2024-02-25 DIAGNOSIS — F41.9 ANXIETY: Primary | ICD-10-CM

## 2024-02-25 DIAGNOSIS — R20.2 TINGLING IN EXTREMITIES: ICD-10-CM

## 2024-02-25 DIAGNOSIS — R25.1 SHAKINESS: ICD-10-CM

## 2024-02-25 LAB
ALBUMIN SERPL-MCNC: 4.5 G/DL (ref 3.4–5)
ALBUMIN/GLOB SERPL: 1.6 {RATIO} (ref 1.1–2.2)
ALP SERPL-CCNC: 114 U/L (ref 40–129)
ALT SERPL-CCNC: 32 U/L (ref 10–40)
ANION GAP SERPL CALCULATED.3IONS-SCNC: 9 MMOL/L (ref 3–16)
AST SERPL-CCNC: 23 U/L (ref 15–37)
BASOPHILS # BLD: 0.1 K/UL (ref 0–0.2)
BASOPHILS NFR BLD: 1.1 %
BILIRUB SERPL-MCNC: 0.5 MG/DL (ref 0–1)
BUN SERPL-MCNC: 19 MG/DL (ref 7–20)
CALCIUM SERPL-MCNC: 9.1 MG/DL (ref 8.3–10.6)
CHLORIDE SERPL-SCNC: 96 MMOL/L (ref 99–110)
CO2 SERPL-SCNC: 26 MMOL/L (ref 21–32)
CREAT SERPL-MCNC: 0.8 MG/DL (ref 0.9–1.3)
DEPRECATED RDW RBC AUTO: 13.4 % (ref 12.4–15.4)
EOSINOPHIL # BLD: 0.2 K/UL (ref 0–0.6)
EOSINOPHIL NFR BLD: 1.9 %
GFR SERPLBLD CREATININE-BSD FMLA CKD-EPI: >60 ML/MIN/{1.73_M2}
GLUCOSE SERPL-MCNC: 116 MG/DL (ref 70–99)
HCT VFR BLD AUTO: 48.1 % (ref 40.5–52.5)
HGB BLD-MCNC: 16.3 G/DL (ref 13.5–17.5)
LYMPHOCYTES # BLD: 1.6 K/UL (ref 1–5.1)
LYMPHOCYTES NFR BLD: 18.8 %
MCH RBC QN AUTO: 34.1 PG (ref 26–34)
MCHC RBC AUTO-ENTMCNC: 33.8 G/DL (ref 31–36)
MCV RBC AUTO: 100.6 FL (ref 80–100)
MONOCYTES # BLD: 0.5 K/UL (ref 0–1.3)
MONOCYTES NFR BLD: 6.4 %
NEUTROPHILS # BLD: 6 K/UL (ref 1.7–7.7)
NEUTROPHILS NFR BLD: 71.8 %
PLATELET # BLD AUTO: 269 K/UL (ref 135–450)
PMV BLD AUTO: 7 FL (ref 5–10.5)
POTASSIUM SERPL-SCNC: 4.1 MMOL/L (ref 3.5–5.1)
PROT SERPL-MCNC: 7.3 G/DL (ref 6.4–8.2)
RBC # BLD AUTO: 4.78 M/UL (ref 4.2–5.9)
SODIUM SERPL-SCNC: 131 MMOL/L (ref 136–145)
TROPONIN, HIGH SENSITIVITY: 16 NG/L (ref 0–22)
WBC # BLD AUTO: 8.3 K/UL (ref 4–11)

## 2024-02-25 PROCEDURE — 99285 EMERGENCY DEPT VISIT HI MDM: CPT

## 2024-02-25 PROCEDURE — 93005 ELECTROCARDIOGRAM TRACING: CPT | Performed by: PHYSICIAN ASSISTANT

## 2024-02-25 PROCEDURE — 71045 X-RAY EXAM CHEST 1 VIEW: CPT

## 2024-02-25 PROCEDURE — 85025 COMPLETE CBC W/AUTO DIFF WBC: CPT

## 2024-02-25 PROCEDURE — 6370000000 HC RX 637 (ALT 250 FOR IP): Performed by: PHYSICIAN ASSISTANT

## 2024-02-25 PROCEDURE — 84484 ASSAY OF TROPONIN QUANT: CPT

## 2024-02-25 PROCEDURE — 80053 COMPREHEN METABOLIC PANEL: CPT

## 2024-02-25 RX ORDER — HYDROXYZINE PAMOATE 25 MG/1
25 CAPSULE ORAL 3 TIMES DAILY PRN
Qty: 20 CAPSULE | Refills: 0 | Status: SHIPPED | OUTPATIENT
Start: 2024-02-25 | End: 2024-03-10

## 2024-02-25 RX ORDER — HYDROXYZINE PAMOATE 25 MG/1
25 CAPSULE ORAL ONCE
Status: COMPLETED | OUTPATIENT
Start: 2024-02-25 | End: 2024-02-25

## 2024-02-25 RX ADMIN — HYDROXYZINE PAMOATE 25 MG: 25 CAPSULE ORAL at 14:00

## 2024-02-25 ASSESSMENT — PAIN - FUNCTIONAL ASSESSMENT
PAIN_FUNCTIONAL_ASSESSMENT: 0-10
PAIN_FUNCTIONAL_ASSESSMENT: 0-10

## 2024-02-25 ASSESSMENT — PAIN SCALES - GENERAL
PAINLEVEL_OUTOF10: 0
PAINLEVEL_OUTOF10: 0

## 2024-02-25 NOTE — DISCHARGE INSTRUCTIONS
Keep a log of your BP readings in order to present them to your doctor when you have a follow-up appointment.

## 2024-02-25 NOTE — ED PROVIDER NOTES
[facility]  ED  EMERGENCY DEPARTMENT ENCOUNTER        Pt Name: Hoang Gomez  MRN: [id number]  Birthdate 1970  Date of evaluation: 2/25/2024  Provider: GLENNY GANDHI PA-C  PCP: Sarah Wilde MD  ED Attending: MD Kenny    AMBER. I have evaluated this patient.      CHIEF COMPLAINT:     Chief Complaint   Patient presents with    Hypertension     Patient reports high blood pressure readings at home; taking lisinopril at home as prescribed.  Patient states he has bilateral tingling in his legs and feet, his eyes are watering and he is shaking more which all happen when his blood pressure gets too high.   Pt reports feeling like this for 6 days.        HISTORY OF PRESENT ILLNESS:      History provided by the patient. No limitations.    Hoang Gomez is a 53 y.o. male who arrives to the ED by private vehicle.  Patient is here concerned about elevated blood pressure readings at home.  He has known hypertension and is on lisinopril with hydrochlorothiazide 20/25.  He has been taking that as prescribed.  He states that for the last 5 to 6 days he has been experiencing fatigue, feeling \"shaky\" and has experienced bilateral lower extremity tingling.  He denies headaches, dizziness, chest pain/tightness, palpitations, shortness of breath or abdominal pain.  He reports when he checks his blood pressure and finds it to be high, it makes his symptoms worse.  He admits that both he and his wife have been under a lot of stress lately.  They live in a townhouse in [place] and were told they had 4 months to move out because the townhouse was being remodeled and prices were going to be increased.  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  He states he has 4 kids.  He and his wife did not have a backup plan or know where they were going to live once this 4 months ran out.  However, he received a call yesterday that they were

## 2024-02-25 NOTE — ED NOTES
Patient discharged with all belongings, discharge paperwork and prescriptions x 1 sent to [facility] pharmacy. Patient verbalized understanding of discharge instructions and follow up with primary care. Patient ambulatory out of ED with friend/family.

## 2024-02-26 LAB
EKG ATRIAL RATE: 90 BPM
EKG DIAGNOSIS: NORMAL
EKG P AXIS: 47 DEGREES
EKG P-R INTERVAL: 158 MS
EKG Q-T INTERVAL: 358 MS
EKG QRS DURATION: 86 MS
EKG QTC CALCULATION (BAZETT): 437 MS
EKG R AXIS: 35 DEGREES
EKG T AXIS: 20 DEGREES
EKG VENTRICULAR RATE: 90 BPM

## 2024-02-26 PROCEDURE — 93010 ELECTROCARDIOGRAM REPORT: CPT | Performed by: INTERNAL MEDICINE

## 2024-04-23 DIAGNOSIS — F41.1 GENERALIZED ANXIETY DISORDER: ICD-10-CM

## 2024-04-23 NOTE — TELEPHONE ENCOUNTER
Refill Request   escitalopram (LEXAPRO) 5 MG tablet     Last Seen: Last Seen Department: 8/29/2023  Last Seen by PCP: 1/9/2023    Last Written: 01/02/24    Next Appointment:   No future appointments.      Requested Prescriptions     Pending Prescriptions Disp Refills    escitalopram (LEXAPRO) 5 MG tablet [Pharmacy Med Name: ESCITALOPRAM 5 MG TABLET] 90 tablet 0     Sig: TAKE 1 TABLET BY MOUTH EVERY DAY        escitalopram (LEXAPRO) 5 MG tablet [[id number]]    Order Details  Dose, Route, Frequency: As Directed   Dispense Quantity: 90 tablet Refills: 0          Sig: TAKE 1 TABLET BY MOUTH EVERY DAY   Patient not taking: Reported on 2/25/2024         Start Date: 01/02/24 End Date: --   Written Date: 01/02/24 Expiration Date: 01/01/25       Associated Diagnoses: Generalized anxiety disorder [F41.1]   Original Order: escitalopram (LEXAPRO) 5 MG tablet [[id number]]   Providers    Authorizing Provider: Sarah Wilde MD  NPI: [id number]   Ordering User: Sarah Wilde MD          Pharmacy    [facility]/pharmacy #[id number] [facility] [address] P [phone] - F [phone]

## 2024-04-25 RX ORDER — ESCITALOPRAM OXALATE 5 MG/1
TABLET ORAL
Qty: 30 TABLET | Refills: 0 | Status: SHIPPED | OUTPATIENT
Start: 2024-04-25

## 2024-05-21 DIAGNOSIS — F41.1 GENERALIZED ANXIETY DISORDER: ICD-10-CM

## 2024-05-21 NOTE — TELEPHONE ENCOUNTER
Refill Request       Last Seen: Last Seen Department: 8/29/2023  Last Seen by PCP: Visit date not found    Last Written: 4/25/24 30 with 0    Next Appointment:   No future appointments.    Requested Prescriptions     Pending Prescriptions Disp Refills    escitalopram (LEXAPRO) 5 MG tablet [Pharmacy Med Name: ESCITALOPRAM 5 MG TABLET] 90 tablet 1     Sig: TAKE 1 TABLET BY MOUTH EVERY DAY. MD VISIT NEEDED FOR MORE REFILLS

## 2024-05-23 NOTE — TELEPHONE ENCOUNTER
30 days was given a month ago and written on the prescription to pharmacy was \"needs appt for further refills\"      Please call pt and schedule with resident physician next week so we can refill meds    Change to 30 day supply and route back once scheduled.

## 2024-05-27 NOTE — TELEPHONE ENCOUNTER
Will the appointment scheduling attachment let him schedule with any of the residents so he can be evaluated, or only with me?    I don't have openings for quite awhile.    We may need to call him to get him in with me or with a resident.

## 2024-05-28 NOTE — TELEPHONE ENCOUNTER
[phone] (home)   full  I called pt's secondary phone number [phone]- Pt's wife picked up I left a message asking to have Hoang call us.

## 2024-06-19 NOTE — TELEPHONE ENCOUNTER
Patient is not responding to voicemail or [employer] message sent for scheduling can we send a letter out to patient please

## 2024-07-17 RX ORDER — ESCITALOPRAM OXALATE 5 MG/1
TABLET ORAL
Qty: 90 TABLET | Refills: 1 | OUTPATIENT
Start: 2024-07-17

## 2024-08-27 ENCOUNTER — HOSPITAL ENCOUNTER (EMERGENCY)
Age: 54
Discharge: HOME | End: 2024-08-27
Attending: EMERGENCY MEDICINE
Payer: COMMERCIAL

## 2024-08-27 ENCOUNTER — APPOINTMENT (OUTPATIENT)
Dept: CT IMAGING | Age: 54
End: 2024-08-27
Payer: COMMERCIAL

## 2024-08-27 VITALS
RESPIRATION RATE: 18 BRPM | SYSTOLIC BLOOD PRESSURE: 190 MMHG | OXYGEN SATURATION: 97 % | DIASTOLIC BLOOD PRESSURE: 102 MMHG | HEART RATE: 89 BPM | TEMPERATURE: 98.7 F

## 2024-08-27 DIAGNOSIS — K08.89 PAIN, DENTAL: ICD-10-CM

## 2024-08-27 DIAGNOSIS — K04.7 DENTAL INFECTION: Primary | ICD-10-CM

## 2024-08-27 DIAGNOSIS — I10 UNCONTROLLED HYPERTENSION: ICD-10-CM

## 2024-08-27 DIAGNOSIS — Z72.0 TOBACCO ABUSE: ICD-10-CM

## 2024-08-27 DIAGNOSIS — K02.9 DENTAL CARIES: ICD-10-CM

## 2024-08-27 LAB
ALBUMIN SERPL-MCNC: 4.3 G/DL (ref 3.4–5)
ALBUMIN/GLOB SERPL: 2 {RATIO} (ref 1.1–2.2)
ALP SERPL-CCNC: 117 U/L (ref 40–129)
ALT SERPL-CCNC: 27 U/L (ref 10–40)
ANION GAP SERPL CALCULATED.3IONS-SCNC: 12 MMOL/L (ref 3–16)
AST SERPL-CCNC: 22 U/L (ref 15–37)
BASOPHILS # BLD: 0.1 K/UL (ref 0–0.2)
BASOPHILS NFR BLD: 1.3 %
BILIRUB SERPL-MCNC: 0.3 MG/DL (ref 0–1)
BUN SERPL-MCNC: 12 MG/DL (ref 7–20)
CALCIUM SERPL-MCNC: 8.9 MG/DL (ref 8.3–10.6)
CHLORIDE SERPL-SCNC: 101 MMOL/L (ref 99–110)
CO2 SERPL-SCNC: 24 MMOL/L (ref 21–32)
CREAT SERPL-MCNC: 0.8 MG/DL (ref 0.9–1.3)
DEPRECATED RDW RBC AUTO: 13.3 % (ref 12.4–15.4)
EOSINOPHIL # BLD: 0.3 K/UL (ref 0–0.6)
EOSINOPHIL NFR BLD: 4.7 %
GFR SERPLBLD CREATININE-BSD FMLA CKD-EPI: >90 ML/MIN/{1.73_M2}
GLUCOSE SERPL-MCNC: 96 MG/DL (ref 70–99)
HCT VFR BLD AUTO: 44.2 % (ref 40.5–52.5)
HGB BLD-MCNC: 15.1 G/DL (ref 13.5–17.5)
LYMPHOCYTES # BLD: 2.3 K/UL (ref 1–5.1)
LYMPHOCYTES NFR BLD: 30.9 %
MCH RBC QN AUTO: 34.7 PG (ref 26–34)
MCHC RBC AUTO-ENTMCNC: 34.1 G/DL (ref 31–36)
MCV RBC AUTO: 101.9 FL (ref 80–100)
MONOCYTES # BLD: 0.4 K/UL (ref 0–1.3)
MONOCYTES NFR BLD: 6 %
NEUTROPHILS # BLD: 4.3 K/UL (ref 1.7–7.7)
NEUTROPHILS NFR BLD: 57.1 %
PLATELET # BLD AUTO: 235 K/UL (ref 135–450)
PMV BLD AUTO: 7.2 FL (ref 5–10.5)
POTASSIUM SERPL-SCNC: 3.6 MMOL/L (ref 3.5–5.1)
PROT SERPL-MCNC: 6.4 G/DL (ref 6.4–8.2)
RBC # BLD AUTO: 4.34 M/UL (ref 4.2–5.9)
SODIUM SERPL-SCNC: 137 MMOL/L (ref 136–145)
WBC # BLD AUTO: 7.5 K/UL (ref 4–11)

## 2024-08-27 PROCEDURE — 6360000004 HC RX CONTRAST MEDICATION: Performed by: PHYSICIAN ASSISTANT

## 2024-08-27 PROCEDURE — 85025 COMPLETE CBC W/AUTO DIFF WBC: CPT

## 2024-08-27 PROCEDURE — 96365 THER/PROPH/DIAG IV INF INIT: CPT

## 2024-08-27 PROCEDURE — 36415 COLL VENOUS BLD VENIPUNCTURE: CPT

## 2024-08-27 PROCEDURE — 99285 EMERGENCY DEPT VISIT HI MDM: CPT

## 2024-08-27 PROCEDURE — 6360000002 HC RX W HCPCS: Performed by: PHYSICIAN ASSISTANT

## 2024-08-27 PROCEDURE — 70491 CT SOFT TISSUE NECK W/DYE: CPT

## 2024-08-27 PROCEDURE — 80053 COMPREHEN METABOLIC PANEL: CPT

## 2024-08-27 PROCEDURE — 96366 THER/PROPH/DIAG IV INF ADDON: CPT

## 2024-08-27 RX ORDER — IBUPROFEN 800 MG/1
800 TABLET, FILM COATED ORAL EVERY 8 HOURS PRN
Qty: 30 TABLET | Refills: 0 | Status: SHIPPED | OUTPATIENT
Start: 2024-08-27

## 2024-08-27 RX ORDER — IOPAMIDOL 755 MG/ML
75 INJECTION, SOLUTION INTRAVASCULAR
Status: COMPLETED | OUTPATIENT
Start: 2024-08-27 | End: 2024-08-27

## 2024-08-27 RX ORDER — CLINDAMYCIN PHOSPHATE 900 MG/50ML
900 INJECTION, SOLUTION INTRAVENOUS ONCE
Status: COMPLETED | OUTPATIENT
Start: 2024-08-27 | End: 2024-08-27

## 2024-08-27 RX ORDER — CLINDAMYCIN HCL 300 MG
300 CAPSULE ORAL 4 TIMES DAILY
Qty: 40 CAPSULE | Refills: 0 | Status: SHIPPED | OUTPATIENT
Start: 2024-08-27 | End: 2024-09-06

## 2024-08-27 RX ADMIN — CLINDAMYCIN PHOSPHATE 900 MG: 900 INJECTION, SOLUTION INTRAVENOUS at 01:20

## 2024-08-27 RX ADMIN — IOPAMIDOL 75 ML: 755 INJECTION, SOLUTION INTRAVENOUS at 01:56

## 2024-08-27 NOTE — ED PROVIDER NOTES
[facility]  ED  EMERGENCY DEPARTMENT ENCOUNTER        Pt Name: Hoang Gomez  MRN: [id number]  Birthdate 1970  Date of evaluation: 8/27/2024  Provider: GLENNY GANDHI PA-C  PCP: Sarah Wilde MD  ED Attending: Antwon Gross MD       I have seen and evaluated this patient with my supervising physician Antwon Gross MD.    CHIEF COMPLAINT:     Chief Complaint   Patient presents with    Dental Pain     \"My teeth have been rotting for years\" increasing pain for the past week        HISTORY OF PRESENT ILLNESS:      History provided by the patient. No limitations.    Hoang Gomez is a 53 y.o. male who arrives to the ED by this patient is here reporting known poor dentition, obvious cavities and tooth decay.  He states his teeth have been \"rotting for years\".  He has had increased pain to the left lower jaw for about a week.  He was seen late last week at urgent care and was placed on amoxicillin 875.  He states he has been on it for 4 to 5 days but has had worsening pain, some swelling, reports feeling ill.  He wonders if maybe these antibiotics are working or if he is becoming septic.  Additionally, he reports he is a smoker and the swelling has him concerned that maybe he has some type of mass or cancer as result of his tobacco abuse.  He is anxious and seeks evaluation.    Nursing Notes were reviewed     REVIEW OF SYSTEMS:     Review of Systems  Positives and pertinent negatives as per HPI.      PAST MEDICAL HISTORY:     Past Medical History:   Diagnosis Date    Anxiety     Chronic back  and neck pain     Degenerative disc disease     Diabetes mellitus (HCC)     prediabetic    Diverticulosis     Fatty liver     H/O renal calculi     Hyperlipidemia     Hypertension     MDD (major depressive disorder)     Obesity     Prediabetes     Pure hypercholesterolemia     Scoliosis     Tobacco dependence        SURGICAL HISTORY:      Past Surgical History:   Procedure Laterality  mild left submandibular pain, swelling and worsening symptoms despite 4 to 5 days of amoxicillin 875 I initiated a workup with labs and CT soft tissue neck with IV contrast.  He is ordered clindamycin and 900 mg IV here in the ED.    CBC reveals normal WBC of 7.5, normal H&H  CMP normal  CT soft tissue neck is limited due to contrast bolus timing. Numerous dental caries noted.    Patient was reassessed and remains stable.  No evidence of deep space infection, Desmond's angina, impending airway compromise. We will plan to change antibiotic to Clindamycin as he has shown so improvement with 4-5 days of Amox 875.    Exclusion criteria - the patient is NOT to be included for SEP-1 Core Measure due to:  2+ SIRS criteria are not met     Consults/discussion with other professionals: None  Social determinants: None  Chronic conditions: Morbid obesity, type 2 diabetes, hypertension, hyperlipidemia, fatty liver, chronic neck and back pain, anxiety, depression  Records reviewed: None    Disposition considerations/Plan (include 1 test not done- why not, d/c vs admit): Patient arrives with known dental caries and increased pain and swelling to the left lower jaw for the past week. He has been on Amox 875 for 4-5 days without improvement. He expresses concern for infection or even possible mass/cancer. Given what I appreciated as some left submandibular swelling, I ordered labs and a CT soft tissue neck which are ultimately normal. He will be placed on Clinda, and will need to follow up with a dentist as well as with a PCP. His BP is elevated and he likely needs a med adjustment.  Employed shared decision making.     FINAL IMPRESSION:      1. Dental infection    2. Pain, dental    3. Dental caries    4. Uncontrolled hypertension    5. Tobacco abuse          DISPOSITION/PLAN:   DISPOSITION Decision To Discharge      PATIENT REFERRED TO:  Dental clinic list provided    Schedule an appointment as soon as possible for a visit in 1

## 2024-08-27 NOTE — ED PROVIDER NOTES
[facility]  ED     EMERGENCY DEPARTMENT ENCOUNTER     Location: [facility]  ED  8/27/2024  Note Started: 7:32 AM EDT 8/27/24      Patient Identification  Hoang Gomez is a 53 y.o. male      HPI:Hoang Gomez was evaluated in the Emergency Department for dental pain.  Patient reports increased pain recently.  He has multiple bad teeth.  He has had some chills but no measured fevers.  No trouble breathing or swallowing.. Although initial history and physical exam information was obtained by AMBER/NPP/MD/DO (who also dictated a record of this visit), I personally saw the patient and performed and made/approved the management plan and take responsibility for the patient management.      PHYSICAL EXAM:  Patient has multiple carious and missing teeth.  Particularly tender along some of the lower teeth along the left side.  Floor of mouth is soft.  No stridor.    CT SOFT TISSUE NECK W CONTRAST   Final Result   Limited exam due to suboptimal contrast bolus.      Numerous dental caries.                 Patient seen and evaluated.  Relevant records reviewed.  MDM  On reevaluation the patient is feeling well and is agreeable with discharge.  Advised return for new or worsening symptoms.       CLINICAL IMPRESSION  1. Dental infection    2. Pain, dental    3. Uncontrolled hypertension          I, Antwon Gross MD, am the primary clinician of record.   I personally saw the patient and independently provided 20 minutes of non-concurrent critical care out of the total shared critical care time excluding separately billable procedures.    This chart was generated in part by using Dragon Dictation system and may contain errors related to that system including errors in grammar, punctuation, and spelling, as well as words and phrases that may be inappropriate. If there are any questions or concerns please feel free to contact the dictating provider for clarification.     Antwon Gross MD  [place]

## 2024-08-27 NOTE — DISCHARGE INSTRUCTIONS
Dental Emergency Referrals    [facility] Department Clinics ([place] residents only)    [facility]  [address] (25)  [phone]   [facility]  [address]  [phone]   [facility]  [address]  [phone]   [facility]  (entrance on [address]. [address]  [address]  [phone]   [facility]  [address]  [phone]   [facility]  [address]  [phone]       [place] Clinics offering Dental Services     [facility]  [address]  [phone]   [facility]  [address]  [phone]     [facility]  [address] 2nd floor  [phone]   [facility]  [address] (26)   [phone]     [facility]  [place]: [address]  [place]: [address]   [phone]   [facility]  [address]  [phone]     [facility] [place]  [address]  [phone]   [place] [facility]  [address]  [phone]     [address]  Oral Surgery Dept: [phone]  Dental Clinic: [phone]   [facility]  [address]  [phone]     [facility]  [address] [phone]   [facility]   [address], [place] [address]  [place] : [phone]  [place] : [phone]     [facility]  [address]  [phone]    Other Dental Clinics in the area    [facility] (D[facility])  [place]  (Across from [facility])  [address] [place] [address] [phone]?      Pediatric Only Dentists    [facility]   Up to age 21  [address]  [phone]    [facility]  Up to age 21  [place]:

## 2024-10-01 DIAGNOSIS — Z91.89 OTHER SPECIFIED PERSONAL RISK FACTORS, NOT ELSEWHERE CLASSIFIED: ICD-10-CM

## 2024-10-01 RX ORDER — ATORVASTATIN CALCIUM 20 MG/1
20 TABLET, FILM COATED ORAL DAILY
Qty: 90 TABLET | Refills: 3 | Status: SHIPPED | OUTPATIENT
Start: 2024-10-01

## 2024-10-01 NOTE — TELEPHONE ENCOUNTER
Refill Request       Last Seen: Last Seen Department: 8/29/2023  Last Seen by PCP: 8/29/2023    Last Written: 8/29/2024    Next Appointment:   No future appointments.    Message to  to schedule appointment.         Requested Prescriptions     Pending Prescriptions Disp Refills    atorvastatin (LIPITOR) 20 MG tablet [Pharmacy Med Name: ATORVASTATIN 20 MG TABLET] 90 tablet 3     Sig: TAKE 1 TABLET BY MOUTH EVERY DAY

## 2025-08-18 ENCOUNTER — APPOINTMENT (OUTPATIENT)
Dept: CT IMAGING | Age: 55
End: 2025-08-18
Payer: MEDICAID

## 2025-08-18 ENCOUNTER — HOSPITAL ENCOUNTER (EMERGENCY)
Age: 55
Discharge: HOME | End: 2025-08-18
Payer: MEDICAID

## 2025-08-18 VITALS
OXYGEN SATURATION: 95 % | WEIGHT: 278 LBS | HEART RATE: 72 BPM | HEIGHT: 72 IN | SYSTOLIC BLOOD PRESSURE: 153 MMHG | RESPIRATION RATE: 18 BRPM | BODY MASS INDEX: 37.65 KG/M2 | DIASTOLIC BLOOD PRESSURE: 98 MMHG | TEMPERATURE: 98.2 F

## 2025-08-18 DIAGNOSIS — R10.9 FLANK PAIN: Primary | ICD-10-CM

## 2025-08-18 LAB
ALBUMIN SERPL-MCNC: 4.5 G/DL (ref 3.4–5)
ALBUMIN/GLOB SERPL: 2.1 {RATIO} (ref 1.1–2.2)
ALP SERPL-CCNC: 119 U/L (ref 40–129)
ALT SERPL-CCNC: 28 U/L (ref 10–40)
ANION GAP SERPL CALCULATED.3IONS-SCNC: 12 MMOL/L (ref 3–16)
AST SERPL-CCNC: 23 U/L (ref 15–37)
BACTERIA URNS QL MICRO: ABNORMAL /HPF
BASOPHILS # BLD: 0.1 K/UL (ref 0–0.2)
BASOPHILS NFR BLD: 0.6 %
BILIRUB SERPL-MCNC: 0.4 MG/DL (ref 0–1)
BILIRUB UR QL STRIP.AUTO: NEGATIVE
BUN SERPL-MCNC: 14 MG/DL (ref 7–20)
CALCIUM SERPL-MCNC: 8.8 MG/DL (ref 8.3–10.6)
CHLORIDE SERPL-SCNC: 104 MMOL/L (ref 99–110)
CLARITY UR: CLEAR
CO2 SERPL-SCNC: 23 MMOL/L (ref 21–32)
COLOR UR: YELLOW
CREAT SERPL-MCNC: 1 MG/DL (ref 0.9–1.3)
CRYSTALS URNS MICRO: ABNORMAL /HPF
DEPRECATED RDW RBC AUTO: 13 % (ref 12.4–15.4)
EOSINOPHIL # BLD: 0.2 K/UL (ref 0–0.6)
EOSINOPHIL NFR BLD: 2 %
GFR SERPLBLD CREATININE-BSD FMLA CKD-EPI: 88 ML/MIN/{1.73_M2}
GLUCOSE SERPL-MCNC: 160 MG/DL (ref 70–99)
GLUCOSE UR STRIP.AUTO-MCNC: NEGATIVE MG/DL
HCT VFR BLD AUTO: 43.8 % (ref 40.5–52.5)
HGB BLD-MCNC: 15.3 G/DL (ref 13.5–17.5)
HGB UR QL STRIP.AUTO: ABNORMAL
KETONES UR STRIP.AUTO-MCNC: NEGATIVE MG/DL
LEUKOCYTE ESTERASE UR QL STRIP.AUTO: NEGATIVE
LIPASE SERPL-CCNC: 36 U/L (ref 13–60)
LYMPHOCYTES # BLD: 1.7 K/UL (ref 1–5.1)
LYMPHOCYTES NFR BLD: 20.7 %
MCH RBC QN AUTO: 34.2 PG (ref 26–34)
MCHC RBC AUTO-ENTMCNC: 34.9 G/DL (ref 31–36)
MCV RBC AUTO: 98.1 FL (ref 80–100)
MONOCYTES # BLD: 0.3 K/UL (ref 0–1.3)
MONOCYTES NFR BLD: 4.2 %
MUCOUS THREADS #/AREA URNS LPF: ABNORMAL /LPF
NEUTROPHILS # BLD: 6 K/UL (ref 1.7–7.7)
NEUTROPHILS NFR BLD: 72.5 %
NITRITE UR QL STRIP.AUTO: NEGATIVE
PH UR STRIP.AUTO: 5.5 [PH] (ref 5–8)
PLATELET # BLD AUTO: 271 K/UL (ref 135–450)
PMV BLD AUTO: 7 FL (ref 5–10.5)
POTASSIUM SERPL-SCNC: 3.5 MMOL/L (ref 3.5–5.1)
PROT SERPL-MCNC: 6.6 G/DL (ref 6.4–8.2)
PROT UR STRIP.AUTO-MCNC: NEGATIVE MG/DL
RBC # BLD AUTO: 4.46 M/UL (ref 4.2–5.9)
RBC #/AREA URNS HPF: ABNORMAL /HPF (ref 0–4)
SODIUM SERPL-SCNC: 139 MMOL/L (ref 136–145)
SP GR UR STRIP.AUTO: >=1.03 (ref 1–1.03)
UA DIPSTICK W REFLEX MICRO PNL UR: YES
URN SPEC COLLECT METH UR: ABNORMAL
UROBILINOGEN UR STRIP-ACNC: 0.2 E.U./DL
WBC # BLD AUTO: 8.2 K/UL (ref 4–11)
WBC #/AREA URNS HPF: ABNORMAL /HPF (ref 0–5)

## 2025-08-18 PROCEDURE — 6360000002 HC RX W HCPCS: Performed by: PHYSICIAN ASSISTANT

## 2025-08-18 PROCEDURE — 99284 EMERGENCY DEPT VISIT MOD MDM: CPT

## 2025-08-18 PROCEDURE — 81001 URINALYSIS AUTO W/SCOPE: CPT

## 2025-08-18 PROCEDURE — 80053 COMPREHEN METABOLIC PANEL: CPT

## 2025-08-18 PROCEDURE — 74176 CT ABD & PELVIS W/O CONTRAST: CPT

## 2025-08-18 PROCEDURE — 83690 ASSAY OF LIPASE: CPT

## 2025-08-18 PROCEDURE — 85025 COMPLETE CBC W/AUTO DIFF WBC: CPT

## 2025-08-18 PROCEDURE — 96375 TX/PRO/DX INJ NEW DRUG ADDON: CPT

## 2025-08-18 PROCEDURE — 96374 THER/PROPH/DIAG INJ IV PUSH: CPT

## 2025-08-18 RX ORDER — ONDANSETRON 2 MG/ML
4 INJECTION INTRAMUSCULAR; INTRAVENOUS ONCE
Status: COMPLETED | OUTPATIENT
Start: 2025-08-18 | End: 2025-08-18

## 2025-08-18 RX ORDER — METHOCARBAMOL 750 MG/1
750 TABLET, FILM COATED ORAL 4 TIMES DAILY
Qty: 40 TABLET | Refills: 0 | Status: SHIPPED | OUTPATIENT
Start: 2025-08-18 | End: 2025-08-28

## 2025-08-18 RX ORDER — KETOROLAC TROMETHAMINE 30 MG/ML
30 INJECTION, SOLUTION INTRAMUSCULAR; INTRAVENOUS ONCE
Status: COMPLETED | OUTPATIENT
Start: 2025-08-18 | End: 2025-08-18

## 2025-08-18 RX ORDER — PREDNISONE 20 MG/1
60 TABLET ORAL DAILY
Qty: 15 TABLET | Refills: 0 | Status: SHIPPED | OUTPATIENT
Start: 2025-08-18 | End: 2025-08-23

## 2025-08-18 RX ADMIN — ONDANSETRON 4 MG: 2 INJECTION, SOLUTION INTRAMUSCULAR; INTRAVENOUS at 20:25

## 2025-08-18 RX ADMIN — KETOROLAC TROMETHAMINE 30 MG: 30 INJECTION, SOLUTION INTRAMUSCULAR at 20:25

## 2025-08-18 ASSESSMENT — PAIN - FUNCTIONAL ASSESSMENT
PAIN_FUNCTIONAL_ASSESSMENT: 0-10
PAIN_FUNCTIONAL_ASSESSMENT: 0-10

## 2025-08-18 ASSESSMENT — PAIN DESCRIPTION - LOCATION
LOCATION: ABDOMEN
LOCATION: ABDOMEN

## 2025-08-18 ASSESSMENT — PAIN DESCRIPTION - FREQUENCY: FREQUENCY: CONTINUOUS

## 2025-08-18 ASSESSMENT — PAIN SCALES - GENERAL
PAINLEVEL_OUTOF10: 5
PAINLEVEL_OUTOF10: 6